# Patient Record
Sex: FEMALE | Race: WHITE | NOT HISPANIC OR LATINO | Employment: OTHER | URBAN - METROPOLITAN AREA
[De-identification: names, ages, dates, MRNs, and addresses within clinical notes are randomized per-mention and may not be internally consistent; named-entity substitution may affect disease eponyms.]

---

## 2017-08-18 ENCOUNTER — CONVERSION ENCOUNTER (OUTPATIENT)
Dept: OTHER | Facility: HOSPITAL | Age: 67
End: 2017-08-18

## 2018-01-17 ENCOUNTER — OFFICE VISIT CONVERTED (OUTPATIENT)
Dept: CARDIOLOGY | Facility: CLINIC | Age: 68
End: 2018-01-17
Attending: INTERNAL MEDICINE

## 2018-01-23 ENCOUNTER — OFFICE VISIT CONVERTED (OUTPATIENT)
Dept: FAMILY MEDICINE CLINIC | Facility: CLINIC | Age: 68
End: 2018-01-23
Attending: NURSE PRACTITIONER

## 2018-02-12 ENCOUNTER — OFFICE VISIT CONVERTED (OUTPATIENT)
Dept: FAMILY MEDICINE CLINIC | Facility: CLINIC | Age: 68
End: 2018-02-12
Attending: NURSE PRACTITIONER

## 2018-10-09 ENCOUNTER — OFFICE VISIT CONVERTED (OUTPATIENT)
Dept: FAMILY MEDICINE CLINIC | Facility: CLINIC | Age: 68
End: 2018-10-09
Attending: NURSE PRACTITIONER

## 2018-10-18 ENCOUNTER — CONVERSION ENCOUNTER (OUTPATIENT)
Dept: OTHER | Facility: HOSPITAL | Age: 68
End: 2018-10-18

## 2018-11-28 ENCOUNTER — OFFICE VISIT CONVERTED (OUTPATIENT)
Dept: CARDIOLOGY | Facility: CLINIC | Age: 68
End: 2018-11-28
Attending: INTERNAL MEDICINE

## 2018-12-13 ENCOUNTER — CONVERSION ENCOUNTER (OUTPATIENT)
Dept: CARDIOLOGY | Facility: CLINIC | Age: 68
End: 2018-12-13
Attending: INTERNAL MEDICINE

## 2018-12-19 ENCOUNTER — CONVERSION ENCOUNTER (OUTPATIENT)
Dept: CARDIOLOGY | Facility: CLINIC | Age: 68
End: 2018-12-19
Attending: INTERNAL MEDICINE

## 2019-01-17 ENCOUNTER — HOSPITAL ENCOUNTER (OUTPATIENT)
Dept: OTHER | Facility: HOSPITAL | Age: 69
Discharge: HOME | End: 2019-01-17
Attending: INTERNAL MEDICINE

## 2019-01-17 LAB
ANION GAP SERPL CALC-SCNC: 17 MMOL/L (ref 8–19)
BNP SERPL-MCNC: 75 PG/ML (ref 0–900)
BUN SERPL-MCNC: 21 MG/DL (ref 5–25)
BUN/CREAT SERPL: 27 {RATIO} (ref 6–20)
CALCIUM SERPL-MCNC: 9.2 MG/DL (ref 8.7–10.4)
CHLORIDE SERPL-SCNC: 102 MMOL/L (ref 99–111)
CONV CO2: 25 MMOL/L (ref 22–32)
CREAT UR-MCNC: 0.79 MG/DL (ref 0.5–0.9)
GFR SERPLBLD BASED ON 1.73 SQ M-ARVRAT: >60 ML/MIN/{1.73_M2}
GLUCOSE SERPL-MCNC: 87 MG/DL (ref 65–99)
OSMOLALITY SERPL CALC.SUM OF ELEC: 292 MOSM/KG (ref 273–304)
POTASSIUM SERPL-SCNC: 4.2 MMOL/L (ref 3.5–5.3)
SODIUM SERPL-SCNC: 140 MMOL/L (ref 135–147)

## 2019-02-26 ENCOUNTER — OFFICE VISIT CONVERTED (OUTPATIENT)
Dept: FAMILY MEDICINE CLINIC | Facility: CLINIC | Age: 69
End: 2019-02-26
Attending: NURSE PRACTITIONER

## 2019-02-26 ENCOUNTER — HOSPITAL ENCOUNTER (OUTPATIENT)
Dept: FAMILY MEDICINE CLINIC | Facility: CLINIC | Age: 69
Discharge: HOME | End: 2019-02-26
Attending: NURSE PRACTITIONER

## 2019-02-28 ENCOUNTER — HOSPITAL ENCOUNTER (OUTPATIENT)
Dept: OTHER | Facility: HOSPITAL | Age: 69
Discharge: HOME | End: 2019-02-28
Attending: NURSE PRACTITIONER

## 2019-02-28 LAB
BACTERIA UR CULT: NORMAL
CREAT BLD-MCNC: 1 MG/DL (ref 0.6–1.4)
GFR SERPLBLD BASED ON 1.73 SQ M-ARVRAT: 58 ML/MIN/{1.73_M2}

## 2019-04-29 ENCOUNTER — OFFICE VISIT CONVERTED (OUTPATIENT)
Dept: UROLOGY | Facility: CLINIC | Age: 69
End: 2019-04-29
Attending: UROLOGY

## 2019-05-06 ENCOUNTER — HOSPITAL ENCOUNTER (OUTPATIENT)
Dept: FAMILY MEDICINE CLINIC | Facility: CLINIC | Age: 69
Discharge: HOME | End: 2019-05-06
Attending: NURSE PRACTITIONER

## 2019-05-06 ENCOUNTER — OFFICE VISIT CONVERTED (OUTPATIENT)
Dept: FAMILY MEDICINE CLINIC | Facility: CLINIC | Age: 69
End: 2019-05-06
Attending: NURSE PRACTITIONER

## 2019-05-08 LAB
AMOXICILLIN+CLAV SUSC ISLT: <=2
BACTERIA UR CULT: ABNORMAL
CEFAZOLIN SUSC ISLT: <=4
CEFEPIME SUSC ISLT: <=1
CEFTAZIDIME SUSC ISLT: <=1
CEFTRIAXONE SUSC ISLT: <=1
CEFUROXIME ORAL SUSC ISLT: 8
CEFUROXIME PARENTER SUSC ISLT: 8
CIPROFLOXACIN SUSC ISLT: <=0.25
ERTAPENEM SUSC ISLT: <=0.5
GENTAMICIN SUSC ISLT: <=1
LEVOFLOXACIN SUSC ISLT: <=0.12
NITROFURANTOIN SUSC ISLT: 32
TETRACYCLINE SUSC ISLT: <=1
TMP SMX SUSC ISLT: <=20
TOBRAMYCIN SUSC ISLT: <=1

## 2019-05-20 ENCOUNTER — PROCEDURE VISIT CONVERTED (OUTPATIENT)
Dept: UROLOGY | Facility: CLINIC | Age: 69
End: 2019-05-20
Attending: UROLOGY

## 2019-06-03 ENCOUNTER — CONVERSION ENCOUNTER (OUTPATIENT)
Dept: FAMILY MEDICINE CLINIC | Facility: CLINIC | Age: 69
End: 2019-06-03

## 2019-06-03 ENCOUNTER — OFFICE VISIT CONVERTED (OUTPATIENT)
Dept: FAMILY MEDICINE CLINIC | Facility: CLINIC | Age: 69
End: 2019-06-03
Attending: NURSE PRACTITIONER

## 2019-06-11 ENCOUNTER — HOSPITAL ENCOUNTER (OUTPATIENT)
Dept: OTHER | Facility: HOSPITAL | Age: 69
Discharge: HOME | End: 2019-06-11
Attending: INTERNAL MEDICINE

## 2019-06-11 LAB
ALBUMIN SERPL-MCNC: 4.6 G/DL (ref 3.5–5)
ALBUMIN/GLOB SERPL: 1.4 {RATIO} (ref 1.4–2.6)
ALP SERPL-CCNC: 45 U/L (ref 43–160)
ALT SERPL-CCNC: 16 U/L (ref 10–40)
ANION GAP SERPL CALC-SCNC: 17 MMOL/L (ref 8–19)
AST SERPL-CCNC: 16 U/L (ref 15–50)
BILIRUB SERPL-MCNC: 0.25 MG/DL (ref 0.2–1.3)
BUN SERPL-MCNC: 21 MG/DL (ref 5–25)
BUN/CREAT SERPL: 26 {RATIO} (ref 6–20)
CALCIUM SERPL-MCNC: 9.3 MG/DL (ref 8.7–10.4)
CHLORIDE SERPL-SCNC: 103 MMOL/L (ref 99–111)
CHOLEST SERPL-MCNC: 195 MG/DL (ref 107–200)
CHOLEST/HDLC SERPL: 3.4 {RATIO} (ref 3–6)
CONV CO2: 26 MMOL/L (ref 22–32)
CONV TOTAL PROTEIN: 7.8 G/DL (ref 6.3–8.2)
CREAT UR-MCNC: 0.81 MG/DL (ref 0.5–0.9)
GFR SERPLBLD BASED ON 1.73 SQ M-ARVRAT: >60 ML/MIN/{1.73_M2}
GLOBULIN UR ELPH-MCNC: 3.2 G/DL (ref 2–3.5)
GLUCOSE SERPL-MCNC: 94 MG/DL (ref 65–99)
HDLC SERPL-MCNC: 57 MG/DL (ref 40–60)
LDLC SERPL CALC-MCNC: 121 MG/DL (ref 70–100)
OSMOLALITY SERPL CALC.SUM OF ELEC: 297 MOSM/KG (ref 273–304)
POTASSIUM SERPL-SCNC: 4.3 MMOL/L (ref 3.5–5.3)
SODIUM SERPL-SCNC: 142 MMOL/L (ref 135–147)
TRIGL SERPL-MCNC: 86 MG/DL (ref 40–150)
VLDLC SERPL-MCNC: 17 MG/DL (ref 5–37)

## 2019-07-01 ENCOUNTER — OFFICE VISIT CONVERTED (OUTPATIENT)
Dept: CARDIOLOGY | Facility: CLINIC | Age: 69
End: 2019-07-01
Attending: INTERNAL MEDICINE

## 2019-07-23 ENCOUNTER — HOSPITAL ENCOUNTER (OUTPATIENT)
Dept: OTHER | Facility: HOSPITAL | Age: 69
Discharge: HOME | End: 2019-07-23
Attending: INTERNAL MEDICINE

## 2019-07-23 LAB
ALBUMIN SERPL-MCNC: 4.3 G/DL (ref 3.5–5)
ALBUMIN/GLOB SERPL: 1.2 {RATIO} (ref 1.4–2.6)
ALP SERPL-CCNC: 49 U/L (ref 43–160)
ALT SERPL-CCNC: 18 U/L (ref 10–40)
ANION GAP SERPL CALC-SCNC: 17 MMOL/L (ref 8–19)
AST SERPL-CCNC: 19 U/L (ref 15–50)
BILIRUB SERPL-MCNC: 0.27 MG/DL (ref 0.2–1.3)
BUN SERPL-MCNC: 17 MG/DL (ref 5–25)
BUN/CREAT SERPL: 21 {RATIO} (ref 6–20)
CALCIUM SERPL-MCNC: 9.4 MG/DL (ref 8.7–10.4)
CHLORIDE SERPL-SCNC: 101 MMOL/L (ref 99–111)
CONV CO2: 25 MMOL/L (ref 22–32)
CONV TOTAL PROTEIN: 7.8 G/DL (ref 6.3–8.2)
CREAT UR-MCNC: 0.82 MG/DL (ref 0.5–0.9)
GFR SERPLBLD BASED ON 1.73 SQ M-ARVRAT: >60 ML/MIN/{1.73_M2}
GLOBULIN UR ELPH-MCNC: 3.5 G/DL (ref 2–3.5)
GLUCOSE SERPL-MCNC: 92 MG/DL (ref 65–99)
OSMOLALITY SERPL CALC.SUM OF ELEC: 289 MOSM/KG (ref 273–304)
POTASSIUM SERPL-SCNC: 4.1 MMOL/L (ref 3.5–5.3)
SODIUM SERPL-SCNC: 139 MMOL/L (ref 135–147)

## 2019-08-01 ENCOUNTER — CONVERSION ENCOUNTER (OUTPATIENT)
Dept: CARDIOLOGY | Facility: CLINIC | Age: 69
End: 2019-08-01
Attending: INTERNAL MEDICINE

## 2019-09-19 ENCOUNTER — OFFICE VISIT CONVERTED (OUTPATIENT)
Dept: FAMILY MEDICINE CLINIC | Facility: CLINIC | Age: 69
End: 2019-09-19
Attending: NURSE PRACTITIONER

## 2019-09-19 ENCOUNTER — CONVERSION ENCOUNTER (OUTPATIENT)
Dept: FAMILY MEDICINE CLINIC | Facility: CLINIC | Age: 69
End: 2019-09-19

## 2019-10-22 ENCOUNTER — HOSPITAL ENCOUNTER (OUTPATIENT)
Dept: OTHER | Facility: HOSPITAL | Age: 69
Discharge: HOME | End: 2019-10-22
Attending: NURSE PRACTITIONER

## 2019-10-22 LAB
BASOPHILS # BLD MANUAL: 0 10*3/UL (ref 0–0.2)
BASOPHILS NFR BLD MANUAL: 0 % (ref 0–3)
DEPRECATED RDW RBC AUTO: 42.5 FL
EOSINOPHIL # BLD MANUAL: 0.03 10*3/UL (ref 0–0.7)
EOSINOPHIL NFR BLD MANUAL: 0.9 % (ref 0–7)
ERYTHROCYTE [DISTWIDTH] IN BLOOD BY AUTOMATED COUNT: 13 % (ref 11.5–14.5)
GRANS (ABSOLUTE): 2.26 10*3/UL (ref 2–8)
GRANS: 64.9 % (ref 30–85)
HBA1C MFR BLD: 11.8 G/DL (ref 12–16)
HCT VFR BLD AUTO: 34.7 % (ref 37–47)
IMM GRANULOCYTES # BLD: 0.01 10*3/UL (ref 0–0.54)
IMM GRANULOCYTES NFR BLD: 0.3 % (ref 0–0.43)
LYMPHOCYTES # BLD MANUAL: 0.82 10*3/UL (ref 1–5)
LYMPHOCYTES NFR BLD MANUAL: 10.3 % (ref 3–10)
MCH RBC QN AUTO: 30.2 PG (ref 27–31)
MCHC RBC AUTO-ENTMCNC: 34 G/DL (ref 33–37)
MCV RBC AUTO: 88.7 FL (ref 81–99)
MONOCYTES # BLD AUTO: 0.36 10*3/UL (ref 0.2–1.2)
PLATELET # BLD AUTO: 200 10*3/UL (ref 130–400)
PMV BLD AUTO: 10.5 FL (ref 7.4–10.4)
RBC # BLD AUTO: 3.91 10*6/UL (ref 4.2–5.4)
T4 FREE SERPL-MCNC: 1.2 NG/DL (ref 0.9–1.8)
TSH SERPL-ACNC: 1.44 M[IU]/L (ref 0.27–4.2)
VARIANT LYMPHS NFR BLD MANUAL: 23.6 % (ref 20–45)
WBC # BLD AUTO: 3.48 10*3/UL (ref 4.8–10.8)

## 2019-11-06 ENCOUNTER — HOSPITAL ENCOUNTER (OUTPATIENT)
Dept: OTHER | Facility: HOSPITAL | Age: 69
Discharge: HOME | End: 2019-11-06
Attending: NURSE PRACTITIONER

## 2019-11-06 LAB
BASOPHILS # BLD MANUAL: 0.01 10*3/UL (ref 0–0.2)
BASOPHILS NFR BLD MANUAL: 0.3 % (ref 0–3)
DEPRECATED RDW RBC AUTO: 42.8 FL
EOSINOPHIL # BLD MANUAL: 0.05 10*3/UL (ref 0–0.7)
EOSINOPHIL NFR BLD MANUAL: 1.4 % (ref 0–7)
ERYTHROCYTE [DISTWIDTH] IN BLOOD BY AUTOMATED COUNT: 12.9 % (ref 11.5–14.5)
GRANS (ABSOLUTE): 2.06 10*3/UL (ref 2–8)
GRANS: 55.8 % (ref 30–85)
HBA1C MFR BLD: 11.1 G/DL (ref 12–16)
HCT VFR BLD AUTO: 32.6 % (ref 37–47)
IMM GRANULOCYTES # BLD: 0.01 10*3/UL (ref 0–0.54)
IMM GRANULOCYTES NFR BLD: 0.3 % (ref 0–0.43)
LYMPHOCYTES # BLD MANUAL: 1.19 10*3/UL (ref 1–5)
LYMPHOCYTES NFR BLD MANUAL: 10 % (ref 3–10)
MCH RBC QN AUTO: 30.6 PG (ref 27–31)
MCHC RBC AUTO-ENTMCNC: 34 G/DL (ref 33–37)
MCV RBC AUTO: 89.8 FL (ref 81–99)
MONOCYTES # BLD AUTO: 0.37 10*3/UL (ref 0.2–1.2)
PLATELET # BLD AUTO: 193 10*3/UL (ref 130–400)
PMV BLD AUTO: 10.1 FL (ref 7.4–10.4)
RBC # BLD AUTO: 3.63 10*6/UL (ref 4.2–5.4)
VARIANT LYMPHS NFR BLD MANUAL: 32.2 % (ref 20–45)
WBC # BLD AUTO: 3.69 10*3/UL (ref 4.8–10.8)

## 2019-11-07 ENCOUNTER — HOSPITAL ENCOUNTER (OUTPATIENT)
Dept: OTHER | Facility: HOSPITAL | Age: 69
Discharge: HOME | End: 2019-11-07
Attending: NURSE PRACTITIONER

## 2019-11-07 LAB
FERRITIN SERPL-MCNC: 299 NG/ML (ref 10–200)
FOLATE SERPL-MCNC: >20 NG/ML (ref 4.8–20)
IRON SATN MFR SERPL: 33 % (ref 20–55)
IRON SERPL-MCNC: 91 UG/DL (ref 60–170)
TIBC SERPL-MCNC: 273 UG/DL (ref 245–450)
TRANSFERRIN SERPL-MCNC: 191 MG/DL (ref 250–380)
VIT B12 SERPL-MCNC: 643 PG/ML (ref 211–911)

## 2019-11-19 ENCOUNTER — HOSPITAL ENCOUNTER (OUTPATIENT)
Dept: OTHER | Facility: HOSPITAL | Age: 69
Discharge: HOME | End: 2019-11-19
Attending: NURSE PRACTITIONER

## 2019-11-19 LAB
BASOPHILS # BLD MANUAL: 0 10*3/UL (ref 0–0.2)
BASOPHILS NFR BLD MANUAL: 0 % (ref 0–3)
DEPRECATED RDW RBC AUTO: 42.8 FL
EOSINOPHIL # BLD MANUAL: 0.06 10*3/UL (ref 0–0.7)
EOSINOPHIL NFR BLD MANUAL: 1.5 % (ref 0–7)
ERYTHROCYTE [DISTWIDTH] IN BLOOD BY AUTOMATED COUNT: 12.8 % (ref 11.5–14.5)
GRANS (ABSOLUTE): 2.45 10*3/UL (ref 2–8)
GRANS: 59.6 % (ref 30–85)
HBA1C MFR BLD: 11.2 G/DL (ref 12–16)
HCT VFR BLD AUTO: 33.1 % (ref 37–47)
IMM GRANULOCYTES # BLD: 0.02 10*3/UL (ref 0–0.54)
IMM GRANULOCYTES NFR BLD: 0.5 % (ref 0–0.43)
LYMPHOCYTES # BLD MANUAL: 1.21 10*3/UL (ref 1–5)
LYMPHOCYTES NFR BLD MANUAL: 9 % (ref 3–10)
MCH RBC QN AUTO: 30.4 PG (ref 27–31)
MCHC RBC AUTO-ENTMCNC: 33.8 G/DL (ref 33–37)
MCV RBC AUTO: 89.9 FL (ref 81–99)
MONOCYTES # BLD AUTO: 0.37 10*3/UL (ref 0.2–1.2)
PLATELET # BLD AUTO: 176 10*3/UL (ref 130–400)
PMV BLD AUTO: 9.7 FL (ref 7.4–10.4)
RBC # BLD AUTO: 3.68 10*6/UL (ref 4.2–5.4)
VARIANT LYMPHS NFR BLD MANUAL: 29.4 % (ref 20–45)
WBC # BLD AUTO: 4.11 10*3/UL (ref 4.8–10.8)

## 2020-01-15 ENCOUNTER — HOSPITAL ENCOUNTER (OUTPATIENT)
Dept: OTHER | Facility: HOSPITAL | Age: 70
Discharge: HOME | End: 2020-01-15
Attending: INTERNAL MEDICINE

## 2020-01-15 LAB
ALBUMIN SERPL-MCNC: 4.5 G/DL (ref 3.5–5)
ALBUMIN/GLOB SERPL: 1.2 {RATIO} (ref 1.4–2.6)
ALP SERPL-CCNC: 45 U/L (ref 43–160)
ALT SERPL-CCNC: 20 U/L (ref 10–40)
ANION GAP SERPL CALC-SCNC: 18 MMOL/L (ref 8–19)
AST SERPL-CCNC: 19 U/L (ref 15–50)
BILIRUB SERPL-MCNC: 0.34 MG/DL (ref 0.2–1.3)
BUN SERPL-MCNC: 26 MG/DL (ref 5–25)
BUN/CREAT SERPL: 25 {RATIO} (ref 6–20)
CALCIUM SERPL-MCNC: 9.6 MG/DL (ref 8.7–10.4)
CHLORIDE SERPL-SCNC: 102 MMOL/L (ref 99–111)
CHOLEST SERPL-MCNC: 196 MG/DL (ref 107–200)
CHOLEST/HDLC SERPL: 4.2 {RATIO} (ref 3–6)
CONV CO2: 24 MMOL/L (ref 22–32)
CONV TOTAL PROTEIN: 8.3 G/DL (ref 6.3–8.2)
CREAT UR-MCNC: 1.02 MG/DL (ref 0.5–0.9)
GFR SERPLBLD BASED ON 1.73 SQ M-ARVRAT: 57 ML/MIN/{1.73_M2}
GLOBULIN UR ELPH-MCNC: 3.8 G/DL (ref 2–3.5)
GLUCOSE SERPL-MCNC: 91 MG/DL (ref 65–99)
HDLC SERPL-MCNC: 47 MG/DL (ref 40–60)
LDLC SERPL CALC-MCNC: 130 MG/DL (ref 70–100)
OSMOLALITY SERPL CALC.SUM OF ELEC: 292 MOSM/KG (ref 273–304)
POTASSIUM SERPL-SCNC: 4.5 MMOL/L (ref 3.5–5.3)
SODIUM SERPL-SCNC: 139 MMOL/L (ref 135–147)
TRIGL SERPL-MCNC: 95 MG/DL (ref 40–150)
VLDLC SERPL-MCNC: 19 MG/DL (ref 5–37)

## 2020-01-22 ENCOUNTER — OFFICE VISIT CONVERTED (OUTPATIENT)
Dept: CARDIOLOGY | Facility: CLINIC | Age: 70
End: 2020-01-22
Attending: INTERNAL MEDICINE

## 2020-01-30 ENCOUNTER — HOSPITAL ENCOUNTER (OUTPATIENT)
Dept: NUCLEAR MEDICINE | Facility: HOSPITAL | Age: 70
Discharge: HOME | End: 2020-01-30
Attending: INTERNAL MEDICINE

## 2020-02-19 ENCOUNTER — OFFICE VISIT CONVERTED (OUTPATIENT)
Dept: CARDIOLOGY | Facility: CLINIC | Age: 70
End: 2020-02-19
Attending: INTERNAL MEDICINE

## 2020-03-25 ENCOUNTER — TELEMEDICINE CONVERTED (OUTPATIENT)
Dept: FAMILY MEDICINE CLINIC | Facility: CLINIC | Age: 70
End: 2020-03-25
Attending: NURSE PRACTITIONER

## 2020-06-25 ENCOUNTER — HOSPITAL ENCOUNTER (OUTPATIENT)
Dept: NUCLEAR MEDICINE | Facility: HOSPITAL | Age: 70
Discharge: HOME | End: 2020-06-25
Attending: INTERNAL MEDICINE

## 2020-08-08 ENCOUNTER — HOSPITAL ENCOUNTER (OUTPATIENT)
Dept: OTHER | Facility: HOSPITAL | Age: 70
Discharge: HOME | End: 2020-08-08
Attending: INTERNAL MEDICINE

## 2020-08-08 LAB
ALBUMIN SERPL-MCNC: 4.2 G/DL (ref 3.5–5)
ALBUMIN/GLOB SERPL: 1.2 {RATIO} (ref 1.4–2.6)
ALP SERPL-CCNC: 47 U/L (ref 43–160)
ALT SERPL-CCNC: 27 U/L (ref 10–40)
ANION GAP SERPL CALC-SCNC: 17 MMOL/L (ref 8–19)
AST SERPL-CCNC: 28 U/L (ref 15–50)
BILIRUB SERPL-MCNC: 0.25 MG/DL (ref 0.2–1.3)
BUN SERPL-MCNC: 24 MG/DL (ref 5–25)
BUN/CREAT SERPL: 24 {RATIO} (ref 6–20)
CALCIUM SERPL-MCNC: 10.2 MG/DL (ref 8.7–10.4)
CHLORIDE SERPL-SCNC: 101 MMOL/L (ref 99–111)
CHOLEST SERPL-MCNC: 214 MG/DL (ref 107–200)
CHOLEST/HDLC SERPL: 4.3 {RATIO} (ref 3–6)
CONV CO2: 26 MMOL/L (ref 22–32)
CONV TOTAL PROTEIN: 7.8 G/DL (ref 6.3–8.2)
CREAT UR-MCNC: 1.01 MG/DL (ref 0.5–0.9)
GFR SERPLBLD BASED ON 1.73 SQ M-ARVRAT: 57 ML/MIN/{1.73_M2}
GLOBULIN UR ELPH-MCNC: 3.6 G/DL (ref 2–3.5)
GLUCOSE SERPL-MCNC: 95 MG/DL (ref 65–99)
HDLC SERPL-MCNC: 50 MG/DL (ref 40–60)
LDLC SERPL CALC-MCNC: 133 MG/DL (ref 70–100)
OSMOLALITY SERPL CALC.SUM OF ELEC: 294 MOSM/KG (ref 273–304)
POTASSIUM SERPL-SCNC: 4.2 MMOL/L (ref 3.5–5.3)
SODIUM SERPL-SCNC: 140 MMOL/L (ref 135–147)
TRIGL SERPL-MCNC: 157 MG/DL (ref 40–150)
VLDLC SERPL-MCNC: 31 MG/DL (ref 5–37)

## 2020-08-10 ENCOUNTER — HOSPITAL ENCOUNTER (OUTPATIENT)
Dept: PREADMISSION TESTING | Facility: HOSPITAL | Age: 70
Discharge: HOME | End: 2020-08-10
Attending: INTERNAL MEDICINE

## 2020-08-11 LAB — SARS-COV-2 RNA SPEC QL NAA+PROBE: NOT DETECTED

## 2020-08-14 ENCOUNTER — HOSPITAL ENCOUNTER (OUTPATIENT)
Dept: CARDIOLOGY | Facility: HOSPITAL | Age: 70
Discharge: HOME | End: 2020-08-14
Attending: INTERNAL MEDICINE

## 2020-08-19 ENCOUNTER — CONVERSION ENCOUNTER (OUTPATIENT)
Dept: CARDIOLOGY | Facility: CLINIC | Age: 70
End: 2020-08-19

## 2020-08-19 ENCOUNTER — OFFICE VISIT CONVERTED (OUTPATIENT)
Dept: CARDIOLOGY | Facility: CLINIC | Age: 70
End: 2020-08-19
Attending: INTERNAL MEDICINE

## 2020-09-03 ENCOUNTER — HOSPITAL ENCOUNTER (OUTPATIENT)
Dept: PREADMISSION TESTING | Facility: HOSPITAL | Age: 70
Discharge: HOME | End: 2020-09-03
Attending: INTERNAL MEDICINE

## 2020-09-04 LAB — SARS-COV-2 RNA SPEC QL NAA+PROBE: NOT DETECTED

## 2020-09-08 ENCOUNTER — HOSPITAL ENCOUNTER (OUTPATIENT)
Dept: CARDIOLOGY | Facility: HOSPITAL | Age: 70
Discharge: HOME | End: 2020-09-08
Attending: INTERNAL MEDICINE

## 2020-09-22 ENCOUNTER — OFFICE VISIT CONVERTED (OUTPATIENT)
Dept: PULMONOLOGY | Facility: CLINIC | Age: 70
End: 2020-09-22
Attending: INTERNAL MEDICINE

## 2020-10-05 ENCOUNTER — HOSPITAL ENCOUNTER (OUTPATIENT)
Dept: SLEEP MEDICINE | Facility: HOSPITAL | Age: 70
Discharge: HOME | End: 2020-10-05
Attending: INTERNAL MEDICINE

## 2020-10-07 ENCOUNTER — OUTSIDE FACILITY SERVICE (OUTPATIENT)
Dept: SLEEP MEDICINE | Facility: HOSPITAL | Age: 70
End: 2020-10-07

## 2020-10-07 ENCOUNTER — OFFICE VISIT CONVERTED (OUTPATIENT)
Dept: FAMILY MEDICINE CLINIC | Age: 70
End: 2020-10-07
Attending: NURSE PRACTITIONER

## 2020-10-07 PROCEDURE — 95810 POLYSOM 6/> YRS 4/> PARAM: CPT | Performed by: INTERNAL MEDICINE

## 2020-10-12 ENCOUNTER — HOSPITAL ENCOUNTER (OUTPATIENT)
Dept: OTHER | Facility: HOSPITAL | Age: 70
Discharge: HOME | End: 2020-10-12
Attending: INTERNAL MEDICINE

## 2020-10-12 LAB
ALBUMIN SERPL-MCNC: 4.1 G/DL (ref 3.5–5)
ALBUMIN/GLOB SERPL: 1.1 {RATIO} (ref 1.4–2.6)
ALP SERPL-CCNC: 46 U/L (ref 43–160)
ALT SERPL-CCNC: 17 U/L (ref 10–40)
ANION GAP SERPL CALC-SCNC: 12 MMOL/L (ref 8–19)
APPEARANCE UR: CLEAR
AST SERPL-CCNC: 19 U/L (ref 15–50)
BACTERIA UR CULT: NORMAL
BACTERIA UR QL AUTO: ABNORMAL
BASOPHILS # BLD AUTO: 0.01 10*3/UL (ref 0–0.2)
BASOPHILS NFR BLD AUTO: 0.4 % (ref 0–3)
BILIRUB SERPL-MCNC: 0.31 MG/DL (ref 0.2–1.3)
BILIRUB UR QL: NEGATIVE
BUN SERPL-MCNC: 21 MG/DL (ref 5–25)
BUN/CREAT SERPL: 21 {RATIO} (ref 6–20)
CALCIUM SERPL-MCNC: 9.5 MG/DL (ref 8.7–10.4)
CASTS URNS QL MICRO: ABNORMAL /[LPF]
CHLORIDE SERPL-SCNC: 103 MMOL/L (ref 99–111)
CHOLEST SERPL-MCNC: 158 MG/DL (ref 107–200)
CHOLEST/HDLC SERPL: 3.4 {RATIO} (ref 3–6)
COLOR UR: YELLOW
CONV ABS IMM GRAN: 0.02 10*3/UL (ref 0–0.2)
CONV CO2: 26 MMOL/L (ref 22–32)
CONV IMMATURE GRAN: 0.8 % (ref 0–1.8)
CONV LEUKOCYTE ESTERASE: ABNORMAL
CONV TOTAL PROTEIN: 7.7 G/DL (ref 6.3–8.2)
CONV UROBILINOGEN IN URINE BY AUTOMATED TEST STRIP: 0.2 {EHRLICHU}/DL (ref 0.1–1)
CREAT UR-MCNC: 1.01 MG/DL (ref 0.5–0.9)
DEPRECATED RDW RBC AUTO: 43 FL (ref 36.4–46.3)
EOSINOPHIL # BLD AUTO: 0.06 10*3/UL (ref 0–0.7)
EOSINOPHIL # BLD AUTO: 2.3 % (ref 0–7)
EPI CELLS #/AREA URNS HPF: ABNORMAL /[HPF]
ERYTHROCYTE [DISTWIDTH] IN BLOOD BY AUTOMATED COUNT: 13.2 % (ref 11.7–14.4)
GFR SERPLBLD BASED ON 1.73 SQ M-ARVRAT: 57 ML/MIN/{1.73_M2}
GLOBULIN UR ELPH-MCNC: 3.6 G/DL (ref 2–3.5)
GLUCOSE 24H UR-MCNC: NEGATIVE MG/DL
GLUCOSE SERPL-MCNC: 95 MG/DL (ref 65–99)
HCT VFR BLD AUTO: 34.7 % (ref 37–47)
HDLC SERPL-MCNC: 46 MG/DL (ref 40–60)
HGB BLD-MCNC: 11.5 G/DL (ref 12–16)
HGB UR QL STRIP: ABNORMAL
KETONES UR QL STRIP: NEGATIVE MG/DL
LDLC SERPL CALC-MCNC: 84 MG/DL (ref 70–100)
LYMPHOCYTES # BLD AUTO: 0.75 10*3/UL (ref 1–5)
LYMPHOCYTES NFR BLD AUTO: 28.6 % (ref 20–45)
MCH RBC QN AUTO: 29.5 PG (ref 27–31)
MCHC RBC AUTO-ENTMCNC: 33.1 G/DL (ref 33–37)
MCV RBC AUTO: 89 FL (ref 81–99)
MONOCYTES # BLD AUTO: 0.29 10*3/UL (ref 0.2–1.2)
MONOCYTES NFR BLD AUTO: 11.1 % (ref 3–10)
MUCOUS THREADS URNS QL MICRO: ABNORMAL
NEUTROPHILS # BLD AUTO: 1.49 10*3/UL (ref 2–8)
NEUTROPHILS NFR BLD AUTO: 56.8 % (ref 30–85)
NITRITE UR-MCNC: NEGATIVE MG/ML
NRBC CBCN: 0 % (ref 0–0.7)
OSMOLALITY SERPL CALC.SUM OF ELEC: 287 MOSM/KG (ref 273–304)
PH UR STRIP.AUTO: 7 [PH] (ref 5–8)
PLATELET # BLD AUTO: 179 10*3/UL (ref 130–400)
PMV BLD AUTO: 10.2 FL (ref 9.4–12.3)
POTASSIUM SERPL-SCNC: 4.4 MMOL/L (ref 3.5–5.3)
PROT UR-MCNC: NEGATIVE MG/DL
RBC # BLD AUTO: 3.9 10*6/UL (ref 4.2–5.4)
RBC # BLD AUTO: ABNORMAL /[HPF]
SODIUM SERPL-SCNC: 137 MMOL/L (ref 135–147)
SP GR UR STRIP: 1.02 (ref 1–1.03)
SPECIMEN SOURCE: ABNORMAL
TRIGL SERPL-MCNC: 142 MG/DL (ref 40–150)
TSH SERPL-ACNC: 2.21 M[IU]/L (ref 0.27–4.2)
UNIDENT CRYS URNS QL MICRO: ABNORMAL /[HPF]
VLDLC SERPL-MCNC: 28 MG/DL (ref 5–37)
WBC # BLD AUTO: 2.62 10*3/UL (ref 4.8–10.8)
WBC #/AREA URNS HPF: ABNORMAL /[HPF]

## 2020-10-14 LAB
AMPICILLIN SUSC ISLT: <=2
BACTERIA UR CULT: ABNORMAL
CIPROFLOXACIN SUSC ISLT: <=0.5
CONV GENTAMICIN HIGH LEVEL SYNERGY: ABNORMAL
CONV STREPTOMYCIN HIGH LEVEL SYNERGY: ABNORMAL
DAPTOMYCIN SUSC ISLT: 4
DOXYCYCLINE SUSC ISLT: 8
ERYTHROMYCIN SUSC ISLT: 4
LEVOFLOXACIN SUSC ISLT: 1
LINEZOLID SUSC ISLT: 2
NITROFURANTOIN SUSC ISLT: <=16
TETRACYCLINE SUSC ISLT: >=16
VANCOMYCIN SUSC ISLT: 1

## 2020-10-22 ENCOUNTER — HOSPITAL ENCOUNTER (OUTPATIENT)
Dept: CT IMAGING | Facility: HOSPITAL | Age: 70
Discharge: HOME | End: 2020-10-22
Attending: INTERNAL MEDICINE

## 2020-10-26 ENCOUNTER — HOSPITAL ENCOUNTER (OUTPATIENT)
Dept: OTHER | Facility: HOSPITAL | Age: 70
Discharge: HOME | End: 2020-10-26
Attending: NURSE PRACTITIONER

## 2020-10-26 LAB
ALBUMIN SERPL-MCNC: 4.4 G/DL (ref 3.5–5)
ALBUMIN/GLOB SERPL: 1.2 {RATIO} (ref 1.4–2.6)
ALP SERPL-CCNC: 47 U/L (ref 43–160)
ALT SERPL-CCNC: 30 U/L (ref 10–40)
ANION GAP SERPL CALC-SCNC: 16 MMOL/L (ref 8–19)
APPEARANCE UR: CLEAR
AST SERPL-CCNC: 27 U/L (ref 15–50)
BACTERIA UR QL AUTO: ABNORMAL
BASOPHILS # BLD AUTO: 0.01 10*3/UL (ref 0–0.2)
BASOPHILS NFR BLD AUTO: 0.3 % (ref 0–3)
BILIRUB SERPL-MCNC: 0.37 MG/DL (ref 0.2–1.3)
BILIRUB UR QL: NEGATIVE
BUN SERPL-MCNC: 19 MG/DL (ref 5–25)
BUN/CREAT SERPL: 21 {RATIO} (ref 6–20)
CALCIUM SERPL-MCNC: 9.7 MG/DL (ref 8.7–10.4)
CASTS URNS QL MICRO: ABNORMAL /[LPF]
CHLORIDE SERPL-SCNC: 100 MMOL/L (ref 99–111)
CHOLEST SERPL-MCNC: 198 MG/DL (ref 107–200)
CHOLEST/HDLC SERPL: 4 {RATIO} (ref 3–6)
COLOR UR: YELLOW
CONV ABS IMM GRAN: 0.02 10*3/UL (ref 0–0.2)
CONV CO2: 25 MMOL/L (ref 22–32)
CONV IMMATURE GRAN: 0.6 % (ref 0–1.8)
CONV LEUKOCYTE ESTERASE: NEGATIVE
CONV TOTAL PROTEIN: 8.1 G/DL (ref 6.3–8.2)
CONV UROBILINOGEN IN URINE BY AUTOMATED TEST STRIP: 0.2 {EHRLICHU}/DL (ref 0.1–1)
CREAT UR-MCNC: 0.9 MG/DL (ref 0.5–0.9)
DEPRECATED RDW RBC AUTO: 44.9 FL (ref 36.4–46.3)
EOSINOPHIL # BLD AUTO: 0.08 10*3/UL (ref 0–0.7)
EOSINOPHIL # BLD AUTO: 2.2 % (ref 0–7)
EPI CELLS #/AREA URNS HPF: ABNORMAL /[HPF]
ERYTHROCYTE [DISTWIDTH] IN BLOOD BY AUTOMATED COUNT: 13.3 % (ref 11.7–14.4)
GFR SERPLBLD BASED ON 1.73 SQ M-ARVRAT: >60 ML/MIN/{1.73_M2}
GLOBULIN UR ELPH-MCNC: 3.7 G/DL (ref 2–3.5)
GLUCOSE 24H UR-MCNC: NEGATIVE MG/DL
GLUCOSE SERPL-MCNC: 92 MG/DL (ref 65–99)
HCT VFR BLD AUTO: 36.2 % (ref 37–47)
HDLC SERPL-MCNC: 49 MG/DL (ref 40–60)
HGB BLD-MCNC: 11.7 G/DL (ref 12–16)
HGB UR QL STRIP: NEGATIVE
KETONES UR QL STRIP: NEGATIVE MG/DL
LDLC SERPL CALC-MCNC: 118 MG/DL (ref 70–100)
LYMPHOCYTES # BLD AUTO: 0.93 10*3/UL (ref 1–5)
LYMPHOCYTES NFR BLD AUTO: 25.9 % (ref 20–45)
MCH RBC QN AUTO: 29.6 PG (ref 27–31)
MCHC RBC AUTO-ENTMCNC: 32.3 G/DL (ref 33–37)
MCV RBC AUTO: 91.6 FL (ref 81–99)
MONOCYTES # BLD AUTO: 0.43 10*3/UL (ref 0.2–1.2)
MONOCYTES NFR BLD AUTO: 12 % (ref 3–10)
MUCOUS THREADS URNS QL MICRO: ABNORMAL
NEUTROPHILS # BLD AUTO: 2.12 10*3/UL (ref 2–8)
NEUTROPHILS NFR BLD AUTO: 59 % (ref 30–85)
NITRITE UR-MCNC: NEGATIVE MG/ML
NRBC CBCN: 0 % (ref 0–0.7)
OSMOLALITY SERPL CALC.SUM OF ELEC: 286 MOSM/KG (ref 273–304)
PH UR STRIP.AUTO: 6.5 [PH] (ref 5–8)
PLATELET # BLD AUTO: 197 10*3/UL (ref 130–400)
PMV BLD AUTO: 10.5 FL (ref 9.4–12.3)
POTASSIUM SERPL-SCNC: 4 MMOL/L (ref 3.5–5.3)
PROT UR-MCNC: NEGATIVE MG/DL
RBC # BLD AUTO: 3.95 10*6/UL (ref 4.2–5.4)
RBC # BLD AUTO: ABNORMAL /[HPF]
SODIUM SERPL-SCNC: 137 MMOL/L (ref 135–147)
SP GR UR STRIP: 1.02 (ref 1–1.03)
SPECIMEN SOURCE: ABNORMAL
TRIGL SERPL-MCNC: 153 MG/DL (ref 40–150)
UNIDENT CRYS URNS QL MICRO: ABNORMAL /[HPF]
VLDLC SERPL-MCNC: 31 MG/DL (ref 5–37)
WBC # BLD AUTO: 3.59 10*3/UL (ref 4.8–10.8)
WBC #/AREA URNS HPF: ABNORMAL /[HPF]

## 2020-10-29 ENCOUNTER — OFFICE VISIT CONVERTED (OUTPATIENT)
Dept: PULMONOLOGY | Facility: CLINIC | Age: 70
End: 2020-10-29
Attending: NURSE PRACTITIONER

## 2020-11-25 ENCOUNTER — HOSPITAL ENCOUNTER (OUTPATIENT)
Dept: OTHER | Facility: HOSPITAL | Age: 70
Discharge: HOME | End: 2020-11-25
Attending: NURSE PRACTITIONER

## 2021-01-13 ENCOUNTER — OFFICE VISIT CONVERTED (OUTPATIENT)
Dept: PULMONOLOGY | Facility: CLINIC | Age: 71
End: 2021-01-13
Attending: NURSE PRACTITIONER

## 2021-02-25 ENCOUNTER — HOSPITAL ENCOUNTER (OUTPATIENT)
Dept: OTHER | Facility: HOSPITAL | Age: 71
Discharge: HOME | End: 2021-02-25
Attending: INTERNAL MEDICINE

## 2021-02-25 LAB
ALBUMIN SERPL-MCNC: 4.4 G/DL (ref 3.5–5)
ALBUMIN/GLOB SERPL: 1.3 {RATIO} (ref 1.4–2.6)
ALP SERPL-CCNC: 53 U/L (ref 43–160)
ALT SERPL-CCNC: 25 U/L (ref 10–40)
ANION GAP SERPL CALC-SCNC: 16 MMOL/L (ref 8–19)
AST SERPL-CCNC: 25 U/L (ref 15–50)
BILIRUB SERPL-MCNC: 0.36 MG/DL (ref 0.2–1.3)
BUN SERPL-MCNC: 24 MG/DL (ref 5–25)
BUN/CREAT SERPL: 27 {RATIO} (ref 6–20)
CALCIUM SERPL-MCNC: 9.2 MG/DL (ref 8.7–10.4)
CHLORIDE SERPL-SCNC: 99 MMOL/L (ref 99–111)
CHOLEST SERPL-MCNC: 164 MG/DL (ref 107–200)
CHOLEST/HDLC SERPL: 3.2 {RATIO} (ref 3–6)
CONV CO2: 24 MMOL/L (ref 22–32)
CONV TOTAL PROTEIN: 7.8 G/DL (ref 6.3–8.2)
CREAT UR-MCNC: 0.88 MG/DL (ref 0.5–0.9)
GFR SERPLBLD BASED ON 1.73 SQ M-ARVRAT: >60 ML/MIN/{1.73_M2}
GLOBULIN UR ELPH-MCNC: 3.4 G/DL (ref 2–3.5)
GLUCOSE SERPL-MCNC: 89 MG/DL (ref 65–99)
HDLC SERPL-MCNC: 52 MG/DL (ref 40–60)
LDLC SERPL CALC-MCNC: 87 MG/DL (ref 70–100)
OSMOLALITY SERPL CALC.SUM OF ELEC: 284 MOSM/KG (ref 273–304)
POTASSIUM SERPL-SCNC: 3.9 MMOL/L (ref 3.5–5.3)
SODIUM SERPL-SCNC: 135 MMOL/L (ref 135–147)
TRIGL SERPL-MCNC: 123 MG/DL (ref 40–150)
VLDLC SERPL-MCNC: 25 MG/DL (ref 5–37)

## 2021-03-08 ENCOUNTER — OFFICE VISIT CONVERTED (OUTPATIENT)
Dept: CARDIOLOGY | Facility: CLINIC | Age: 71
End: 2021-03-08
Attending: INTERNAL MEDICINE

## 2021-03-08 ENCOUNTER — CONVERSION ENCOUNTER (OUTPATIENT)
Dept: CARDIOLOGY | Facility: CLINIC | Age: 71
End: 2021-03-08

## 2021-04-15 ENCOUNTER — HOSPITAL ENCOUNTER (OUTPATIENT)
Dept: OTHER | Facility: HOSPITAL | Age: 71
Discharge: HOME | End: 2021-04-15
Attending: NURSE PRACTITIONER

## 2021-04-26 ENCOUNTER — OFFICE VISIT CONVERTED (OUTPATIENT)
Dept: PULMONOLOGY | Facility: CLINIC | Age: 71
End: 2021-04-26
Attending: INTERNAL MEDICINE

## 2021-04-29 ENCOUNTER — HOSPITAL ENCOUNTER (OUTPATIENT)
Dept: OTHER | Facility: HOSPITAL | Age: 71
Discharge: HOME | End: 2021-04-29
Attending: NURSE PRACTITIONER

## 2021-04-29 LAB
ALBUMIN SERPL-MCNC: 4.6 G/DL (ref 3.5–5)
ALBUMIN/GLOB SERPL: 1.3 {RATIO} (ref 1.4–2.6)
ALP SERPL-CCNC: 46 U/L (ref 43–160)
ALT SERPL-CCNC: 28 U/L (ref 10–40)
ANION GAP SERPL CALC-SCNC: 15 MMOL/L (ref 8–19)
AST SERPL-CCNC: 25 U/L (ref 15–50)
BASOPHILS # BLD MANUAL: 0.01 10*3/UL (ref 0–0.2)
BASOPHILS NFR BLD MANUAL: 0.3 % (ref 0–3)
BILIRUB SERPL-MCNC: 0.23 MG/DL (ref 0.2–1.3)
BUN SERPL-MCNC: 26 MG/DL (ref 5–25)
BUN/CREAT SERPL: 24 {RATIO} (ref 6–20)
CALCIUM SERPL-MCNC: 9.7 MG/DL (ref 8.7–10.4)
CHLORIDE SERPL-SCNC: 101 MMOL/L (ref 99–111)
CONV CO2: 27 MMOL/L (ref 22–32)
CONV TOTAL PROTEIN: 8.1 G/DL (ref 6.3–8.2)
CREAT UR-MCNC: 1.07 MG/DL (ref 0.5–0.9)
DEPRECATED RDW RBC AUTO: 43.9 FL
EOSINOPHIL # BLD MANUAL: 0.07 10*3/UL (ref 0–0.7)
EOSINOPHIL NFR BLD MANUAL: 2.1 % (ref 0–7)
ERYTHROCYTE [DISTWIDTH] IN BLOOD BY AUTOMATED COUNT: 13 % (ref 11.5–14.5)
GFR SERPLBLD BASED ON 1.73 SQ M-ARVRAT: 53 ML/MIN/{1.73_M2}
GLOBULIN UR ELPH-MCNC: 3.5 G/DL (ref 2–3.5)
GLUCOSE SERPL-MCNC: 75 MG/DL (ref 65–99)
GRANS (ABSOLUTE): 1.82 10*3/UL (ref 2–8)
GRANS: 55.7 % (ref 30–85)
HBA1C MFR BLD: 12.9 G/DL (ref 12–16)
HCT VFR BLD AUTO: 39.1 % (ref 37–47)
IMM GRANULOCYTES # BLD: 0.02 10*3/UL (ref 0–0.54)
IMM GRANULOCYTES NFR BLD: 0.6 % (ref 0–0.43)
LYMPHOCYTES # BLD MANUAL: 1.03 10*3/UL (ref 1–5)
LYMPHOCYTES NFR BLD MANUAL: 9.8 % (ref 3–10)
MCH RBC QN AUTO: 29.9 PG (ref 27–31)
MCHC RBC AUTO-ENTMCNC: 33 G/DL (ref 33–37)
MCV RBC AUTO: 90.7 FL (ref 81–99)
MONOCYTES # BLD AUTO: 0.32 10*3/UL (ref 0.2–1.2)
OSMOLALITY SERPL CALC.SUM OF ELEC: 291 MOSM/KG (ref 273–304)
PLATELET # BLD AUTO: 163 10*3/UL (ref 130–400)
PMV BLD AUTO: 10 FL (ref 7.4–10.4)
POTASSIUM SERPL-SCNC: 4.4 MMOL/L (ref 3.5–5.3)
RBC # BLD AUTO: 4.31 10*6/UL (ref 4.2–5.4)
SODIUM SERPL-SCNC: 139 MMOL/L (ref 135–147)
VARIANT LYMPHS NFR BLD MANUAL: 31.5 % (ref 20–45)
WBC # BLD AUTO: 3.27 10*3/UL (ref 4.8–10.8)

## 2021-05-01 ENCOUNTER — HOSPITAL ENCOUNTER (OUTPATIENT)
Dept: CARDIOLOGY | Facility: HOSPITAL | Age: 71
Discharge: HOME | End: 2021-05-01
Attending: INTERNAL MEDICINE

## 2021-05-10 ENCOUNTER — OFFICE VISIT CONVERTED (OUTPATIENT)
Dept: CARDIOLOGY | Facility: CLINIC | Age: 71
End: 2021-05-10
Attending: INTERNAL MEDICINE

## 2021-05-13 NOTE — PROGRESS NOTES
Progress Note      Patient Name: Anabell Richards   Patient ID: [id number]   Sex: Female   YOB: 1950    Primary Care Provider: Priscilla GORDILLO   Referring Provider: Priscilla GORDILLO    Visit Date: August 19, 2020    Provider: Jenny Valle MD   Location: [facility]   Location Address: [address] [place] [address], [address]   SHANEKA Vang  [id number]   Location Phone: [phone]          Chief Complaint     Shortness of breath.       History Of Present Illness  REFERRING PROVIDER: Priscilla GORDILLO   Anabell Richards is a 70 year old /White female who continues to be short of breath. She can walk on level ground without any issues, but if she gets on any incline, she gets very short-winded. She is short-winded when she carries things, or exerts herself, such as when she reaches up high. She states essentially it is unchanged since she was here in February. She had put off some of her pulmonary testing until last week. She has occasional palpitations, but denies any chest pain. Swelling is mild. Denies any dizziness, syncope, PND, or orthopnea. She stopped taking her Crestor/rosuvastatin several weeks ago and at least a week before she did her labs. She was also noted to have some heartburn when she took the Crestor at nighttime, but it was better in the daytime.   PAST MEDICAL HISTORY: Hyperlipidemia; Hypertension; Mitral valve regurgitation.   FAMILY HISTORY: Positive for hypertension and heart disease. Negative for diabetes mellitus.   PSYCHOSOCIAL HISTORY: Denies alcohol or tobacco use. Denies mood changes or depression.   CURRENT MEDICATIONS: She was on rosuvastatin 5 mg but none for the last few weeks; Co-Q10 200 mg q. h.s.; aspirin 81 mg daily; metoprolol ER 25 mg 1-1/2 tabs q. h.s.; hydrochlorothiazide 12.5 mg daily; Zestril 5 mg daily; Tylenol 325 mg p.r.n.; Claritin 10 mg p.r.n.; doxycycline 50 mg b.i.d. for a month.       Review of  "Systems  · Cardiovascular  o Admits  o : palpitations (fast, fluttering, or skipping beats), swelling (feet, ankles, hands), shortness of breath while walking or lying flat  o Denies  o : chest pain or angina pectoris   · Respiratory  o Denies  o : chronic or frequent cough, asthma or wheezing      Vitals  Date Time BP Position Site L\R Cuff Size HR RR TEMP (F) WT  HT  BMI kg/m2 BSA m2 O2 Sat        08/19/2020 12:28 /78 Sitting    74 - R   175lbs 16oz 5'  6\" 28.41 1.93           Physical Examination  · Constitutional  o Appearance  o : Awake, alert, in no acute distress.  · Eyes  o Conjunctivae  o : Conjunctivae normal.  · Ears, Nose, Mouth and Throat  o Oral Cavity  o :   § Oral Mucosa  § : Normal.  · Neck  o Jugular Veins  o : No JVD. Good carotid upstroke. No bruits noted.  · Respiratory  o Respiratory  o : Good respiratory effort. Clear to percussion and auscultation.  · Cardiovascular  o Heart  o : PMI is normal. S1, S2 normal. No S3. No S4. 1-2/6 systolic murmur at the apex. Negative diastolic murmur.   o Peripheral Vascular System  o :   § Extremities  § : Good femoral and pedal pulses. No pedal edema.  · Gastrointestinal  o Abdominal Examination  o : Soft. No tenderness or masses felt. No hepatosplenomegaly. Abdominal aorta is not palpable.  · Imaging  o Imaging  o : VQ lung scan of 06/25/2020 indicated findings consistent with low probability of PE. Chest X-ray from 06/25/2020 indicated mildly enlarged cardiac silhouette with no acute pulmonary abnormality.   · Labs  o Labs  o : Most recent labs since she has been off Crestor, BUN 24, creatinine 1.01, total cholesterol 214, triglycerides 157, HDL 50, .   · Diagnostic Testing  o Diagnostic Testing  o : Six-minute walk and PFT results are pending.           Assessment     1.  Shortness of breath.  2.  Hypertension, controlled.  3.  Hyperlipidemia, not to goal with intolerance to higher-dose statins.    4.  Mitral valve regurgitation. "       Plan     1.  Start Zetia 10 mg once a day.  2.  Restart Crestor at 5 mg but every other day.  3.  Awaiting the results of pulmonary function tests, and if normal, will consider a ERNA.  4.  Check lipids in 3 months.   5.  Further plans based on pulmonary function tests.        RAND Deleon/Jenny Valle MD, [facility]  JF:PM:vm               Electronically Signed by: Maxine Katz-, Other -Author on August 20, 2020 08:16:42 AM  Electronically Co-signed by: RADN Diamond -Reviewer on August 20, 2020 08:38:17 AM  Electronically Co-signed by: Jenny Valle MD -Reviewer on August 22, 2020 03:04:46 PM

## 2021-05-14 VITALS
BODY MASS INDEX: 28.93 KG/M2 | SYSTOLIC BLOOD PRESSURE: 136 MMHG | WEIGHT: 180 LBS | HEART RATE: 72 BPM | HEIGHT: 66 IN | DIASTOLIC BLOOD PRESSURE: 74 MMHG

## 2021-05-14 NOTE — PROGRESS NOTES
"   Progress Note      Patient Name: Anabell Richards   Patient ID: [id number]   Sex: Female   YOB: 1950    Primary Care Provider: Priscilla GORDILLO   Referring Provider: Priscilla GORDILLO    Visit Date: March 8, 2021    Provider: Jenny Valle MD   Location: [facility]   Location Address: [address] [place] [address], [address]   [place]KA Vang  [id number]   Location Phone: [phone]          Chief Complaint     Followup of shortness of breath and palpitations.       History Of Present Illness  REFERRING PROVIDER: Maggie GORDILLO   Anabell Richards is a 71 year old /White female with symptomatic shortness of breath who states that since the changes in her medication following the ERNA and she started using the CPAP at night that she is feeling less short of breath with her activities. Her shortness of breath is mild to moderate on moderate to heavy exertion now and she is able to do more of her exercise and everything else. She denies chest pain, palpitations, dizziness, or syncope. She has not had any myalgias.   PAST MEDICAL HISTORY: Hyperlipidemia; Hypertension; Mitral valve regurgitation.   PSYCHOSOCIAL HISTORY: Denies alcohol use. Denies tobacco use.   CURRENT MEDICATIONS: Medication list was reviewed and is as documented. She takes rosuvastatin 5 mg every other day in addition to her other medications.      ALLERGIES: Bactrim DS; flu vaccine ts 2011-12(18 yr+)       Review of Systems  · Cardiovascular  o Admits  o : palpitations (fast, fluttering, or skipping beats), shortness of breath while walking or lying flat  o Denies  o : swelling (feet, ankles, hands), chest pain or angina pectoris   · Respiratory  o Denies  o : chronic or frequent cough      Vitals  Date Time BP Position Site L\R Cuff Size HR RR TEMP (F) WT  HT  BMI kg/m2 BSA m2 O2 Sat FR L/min FiO2 HC       03/08/2021 12:09 /74 Sitting    72 - R   180lbs 0oz 5'  6\" 29.05 1.95             Physical " Examination  · Constitutional  o Appearance  o : Awake, alert, in no acute distress.  · Eyes  o Conjunctivae  o : Conjunctivae normal.  · Ears, Nose, Mouth and Throat  o Oral Cavity  o :   § Oral Mucosa  § : Normal.  · Neck  o Inspection/Palpation/Auscultation  o : No lymphadenopathy. No JVD. No bruit. Good carotid upstroke.  · Respiratory  o Respiratory  o : Clear to percussion and auscultation. Good respiratory effort.  · Cardiovascular  o Heart  o : PMI is normal. S1, S2 normal. No S3. No S4. 1-2/6 systolic murmur at the apex. Negative diastolic murmur.   o Peripheral Vascular System  o :   § Extremities  § : Good femoral and pedal pulses. No pedal edema.  · Gastrointestinal  o Abdominal Examination  o : Soft. No masses or tenderness felt. No hepatosplenomegaly. Abdominal aorta is not palpable.  · Labs  o Labs  o : Chemistry panel was normal. HDL 52, LDL 87, triglycerides 123.          Assessment     1.  Symptomatic shortness of breath, improved.   2.  Hypertension, controlled.   3.  Hyperlipidemia, LDL improved but still not quite at goal.   4.  Mitral valve regurgitation.       Plan     1.  Increase Crestor to 5 mg every day except Thursdays and Sundays. (She will take 5 days a week.)  2.  Increase metoprolol extended release to 50 mg at bedtime.  3.  Home blood pressure monitoring.   4.  She has been encouraged to use her CPAP every night, try to exercise, and lose weight.   5.  She will continue her hydrochlorothiazide, lisinopril, and ezetimibe in the same dosage.   6.  Follow up with her in 8 months or earlier if necessary.     Jenny Valle MD, [facility]  PM/rt               Electronically Signed by: Antonella Henning-, Other -Author on March 16, 2021 08:30:57 AM

## 2021-05-15 VITALS
DIASTOLIC BLOOD PRESSURE: 77 MMHG | HEART RATE: 80 BPM | OXYGEN SATURATION: 97 % | SYSTOLIC BLOOD PRESSURE: 131 MMHG | WEIGHT: 165.5 LBS

## 2021-05-15 VITALS
BODY MASS INDEX: 28.28 KG/M2 | DIASTOLIC BLOOD PRESSURE: 78 MMHG | HEART RATE: 74 BPM | HEIGHT: 66 IN | WEIGHT: 176 LBS | SYSTOLIC BLOOD PRESSURE: 136 MMHG

## 2021-05-15 VITALS
BODY MASS INDEX: 26.06 KG/M2 | SYSTOLIC BLOOD PRESSURE: 120 MMHG | WEIGHT: 166 LBS | HEART RATE: 64 BPM | HEIGHT: 67 IN | DIASTOLIC BLOOD PRESSURE: 70 MMHG

## 2021-05-15 VITALS
DIASTOLIC BLOOD PRESSURE: 60 MMHG | WEIGHT: 170 LBS | SYSTOLIC BLOOD PRESSURE: 146 MMHG | BODY MASS INDEX: 27.32 KG/M2 | HEART RATE: 56 BPM | HEIGHT: 66 IN

## 2021-05-15 VITALS
WEIGHT: 163.25 LBS | BODY MASS INDEX: 26.24 KG/M2 | DIASTOLIC BLOOD PRESSURE: 69 MMHG | SYSTOLIC BLOOD PRESSURE: 128 MMHG | HEIGHT: 66 IN | OXYGEN SATURATION: 100 % | HEART RATE: 59 BPM

## 2021-05-15 VITALS
BODY MASS INDEX: 26.28 KG/M2 | HEIGHT: 66 IN | WEIGHT: 163.5 LBS | SYSTOLIC BLOOD PRESSURE: 126 MMHG | DIASTOLIC BLOOD PRESSURE: 78 MMHG

## 2021-05-15 VITALS
HEIGHT: 66 IN | DIASTOLIC BLOOD PRESSURE: 64 MMHG | SYSTOLIC BLOOD PRESSURE: 124 MMHG | WEIGHT: 169 LBS | HEART RATE: 64 BPM | BODY MASS INDEX: 27.16 KG/M2

## 2021-05-15 VITALS
SYSTOLIC BLOOD PRESSURE: 147 MMHG | WEIGHT: 169 LBS | OXYGEN SATURATION: 99 % | HEART RATE: 69 BPM | DIASTOLIC BLOOD PRESSURE: 65 MMHG | HEIGHT: 66 IN | BODY MASS INDEX: 27.16 KG/M2

## 2021-05-16 VITALS — SYSTOLIC BLOOD PRESSURE: 150 MMHG | HEART RATE: 80 BPM | WEIGHT: 179 LBS | DIASTOLIC BLOOD PRESSURE: 72 MMHG

## 2021-05-16 VITALS
HEIGHT: 66 IN | HEART RATE: 55 BPM | DIASTOLIC BLOOD PRESSURE: 73 MMHG | BODY MASS INDEX: 27.97 KG/M2 | WEIGHT: 174 LBS | SYSTOLIC BLOOD PRESSURE: 147 MMHG

## 2021-05-16 VITALS
SYSTOLIC BLOOD PRESSURE: 138 MMHG | WEIGHT: 172 LBS | HEIGHT: 67 IN | HEART RATE: 56 BPM | DIASTOLIC BLOOD PRESSURE: 66 MMHG | BODY MASS INDEX: 27 KG/M2

## 2021-05-16 VITALS
HEIGHT: 66 IN | TEMPERATURE: 96.7 F | HEART RATE: 84 BPM | WEIGHT: 168 LBS | BODY MASS INDEX: 27 KG/M2 | DIASTOLIC BLOOD PRESSURE: 79 MMHG | OXYGEN SATURATION: 98 % | SYSTOLIC BLOOD PRESSURE: 152 MMHG | RESPIRATION RATE: 12 BRPM

## 2021-05-16 VITALS
HEIGHT: 67 IN | HEART RATE: 72 BPM | DIASTOLIC BLOOD PRESSURE: 64 MMHG | BODY MASS INDEX: 26.68 KG/M2 | WEIGHT: 170 LBS | SYSTOLIC BLOOD PRESSURE: 118 MMHG

## 2021-05-16 VITALS
DIASTOLIC BLOOD PRESSURE: 69 MMHG | WEIGHT: 177 LBS | HEART RATE: 93 BPM | HEIGHT: 66 IN | SYSTOLIC BLOOD PRESSURE: 129 MMHG | BODY MASS INDEX: 28.45 KG/M2 | TEMPERATURE: 98.6 F

## 2021-05-16 VITALS
HEART RATE: 72 BPM | BODY MASS INDEX: 27.94 KG/M2 | SYSTOLIC BLOOD PRESSURE: 134 MMHG | DIASTOLIC BLOOD PRESSURE: 70 MMHG | HEIGHT: 67 IN | WEIGHT: 178 LBS

## 2021-05-18 NOTE — PROGRESS NOTES
Evie Richards  1950     Office/Outpatient Visit    Visit Date: Wed, Oct 7, 2020 01:37 pm    Provider: Maggie Pena N.P. (Assistant: Noelle Stone, )    Location: [facility]        Electronically signed by Maggie Pena N.P. on  10/07/2020 07:22:55 PM                             Subjective:        CC: Ms. Richards is a 70 year old female.  This is her first visit to the clinic.          HPI:           Patient to be evaluated for encounter for general adult medical examination without abnormal findings.  Her last physical exam was 1 year ago.  She is menopausal.  She is not currently using any form of contraception.  She performs breast self-exams occasionally.   Her last mammogram was in 10/2019 and was normal.   She underwent colonoscopy in 2014 with normal results.   Preventative Health updated today.  She is current with her influenza immunization.  Ms. Richards has had an abnormal Pap smear in the past. had D/C done  after than no problems Tobacco: She has never smoked.      ROS:     CONSTITUTIONAL:  Negative for fatigue and fever.      CARDIOVASCULAR:  Positive for hx HTN, High chol for 10 + years.      RESPIRATORY:  Positive for dyspnea ( several years , had sleep study at [facility] 10/2020 ).   Negative for recent cough or frequent wheezing.      GASTROINTESTINAL:  Negative for abdominal pain, constipation, diarrhea, nausea and vomiting.      GENITOURINARY:  Negative for dysuria and hematuria.      MUSCULOSKELETAL:  Negative for arthralgias and myalgias.      INTEGUMENTARY/BREAST:  Negative for atypical mole(s) and rash.      NEUROLOGICAL:  Negative for dizziness, memory loss, paresthesias, tremor and weakness.      ENDOCRINE:  Positive for Hyperthyroidism, resolved itself. no meds.      PSYCHIATRIC:  Negative for anxiety, depression, and sleep disturbances.          Past Medical History / Family History / Social History:         Last Reviewed on 10/07/2020 02:33 PM by  Maggie Pena    Past Medical History:                 PAST MEDICAL HISTORY         Hyperlipidemia    Hypertension     Dr Christiansen     Blood in urine, worked up neg , DR Ariza         CURRENT MEDICAL PROVIDERS:    Cardiologist: DR Valle         PREVENTIVE HEALTH MAINTENANCE             BONE DENSITY: was last done  with normal results     COLORECTAL CANCER SCREENING: Up to date (colonoscopy q10y; sigmoidoscopy q5y; Cologuard q3y) was last done , Results are in chart; colonoscopy with normal results     MAMMOGRAM: was last done 10/2019 with normal results     PAP SMEAR: was last done age 50s with normal results No longer indicated due to age and history         Surgical History:         Biopsy of Both breast; benign    Cataract Removal: bilateral; ;     Dilation and Curettage: at age age 32, abnormal PAP , was neg.;     Cysto for hematuria , neg. , TVT ;         Family History:     Father:  at age 83; Cause of death was A fib  with Clot     Mother:  at age 81; Cause of death was Heart failure     Brother(s): 3 alive and some with thyroid, Addisons disease, Celiac disease, brother(s) total     Sister(s): 3 , one with Lupus , Fibromyalgia. 2 healthy sister(s) total         Social History:     Occupation: Retired (Prior occupation: [employer] , RN for 28 yrs)     Marital Status:      Children: 2 children         Tobacco/Alcohol/Supplements:     Last Reviewed on 10/07/2020 02:33 PM by Maggie Pena    Tobacco: She has never smoked.          Substance Abuse History:     Last Reviewed on 10/07/2020 02:33 PM by Maggie Pena        Mental Health History:     Last Reviewed on 10/07/2020 02:33 PM by Maggie Pena        Communicable Diseases (eg STDs):     Last Reviewed on 10/07/2020 02:33 PM by Maggie Pena        Current Problems:     Last Reviewed on 10/07/2020 02:33 PM by Maggie Pena    Hyperlipidemia, unspecified    Essential (primary) hypertension    Nonrheumatic  mitral (valve) prolapse    Sicca syndrome, unspecified        Immunizations:     Influenza, high dose seasonal 10/7/2020        Allergies:     Last Reviewed on 10/07/2020 02:33 PM by Maggie Pena    Bactrim: Rash         Current Medications:     Last Reviewed on 10/07/2020 02:33 PM by Maggie Pena    aspirin 81 mg oral tablet, delayed release (enteric coated) [take 1 tablet (81 mg) by oral route once daily]    CoQ-10 100 mg oral capsule    HYDROCHLOROTHIAZIDE 12.5 MG TB    METOPROLOL SUCC ER 25 MG TAB    ROSUVASTATIN CALCIUM 5 MG TAB    LISINOPRIL 5 MG TABLET        Objective:        Vitals:         Current: 10/7/2020 1:44:08 PM    Ht:  5 ft, 7 in;  Wt: 174 lbs;  BMI: 27.3T: 97.7 F (temporal);  BP: 120/68 mm Hg (left arm, sitting);  P: 77 bpm (left arm (BP Cuff), sitting)        Exams:     PHYSICAL EXAM:     GENERAL: vital signs recorded - well developed, well nourished;  well groomed;  no apparent distress;     NECK:  supple, full ROM; no thyromegaly; no carotid bruits;     RESPIRATORY: normal respiratory rate and pattern with no distress; normal breath sounds with no rales, rhonchi, wheezes or rubs;     CARDIOVASCULAR: normal rate; rhythm is regular;  no systolic murmur; no edema;     GASTROINTESTINAL: nontender, nondistended; no hepatosplenomegaly or masses; no bruits;     MUSCULOSKELETAL:  Normal range of motion, strength and tone;     NEUROLOGIC: GROSSLY INTACT     PSYCHIATRIC:  appropriate affect and demeanor; normal speech pattern; grossly normal memory;         Assessment:         Z00.00   Encounter for general adult medical examination without abnormal findings       Z23   Encounter for immunization       Z12.31   Encounter for screening mammogram for malignant neoplasm of breast           ORDERS:         Meds Prescribed:       [New Rx] Shingrix (PF) 50 mcg/0.5 mL intramuscular Suspension for Reconstitution [inject 0.5 milliliter (50 mcg) by intramuscular route once], #1 (one) kit, Refills: 0  (zero)         Radiology/Test Orders:       [id number]  Screening digital breast tomosynthesis bi  (Send-Out)            3017F  Colorectal CA screen results documented and reviewed (PV)  (In-House)              Lab Orders:       [id number]  [facility] PHYSICAL: CMP, CBC, TSH, LIPID: [id number], [id number], [id number], [id number]  (Send-Out)            26998  [facility] Urinalysis, automated, with micro  (Send-Out)              Procedures Ordered:       [id number]  Pneumococcal polysaccharide vaccine, 23-valent, adult or immunosuppressed patient dosage, for use in individuals 2 years or older, for subcutaneous or intramuscular use  (In-House)              Other Orders:       1101F  Pt screen for fall risk; document no falls in past year or only 1 fall w/o injury in past year (JORGE L)  (In-House)              Screening mammogram results documented  (Send-Out)              Administration of pneumococcal vaccine  (x1)                  Plan:         Encounter for general adult medical examination without abnormal findings    LABORATORY:  Labs ordered to be performed today include PHYSICAL PANEL; CMP, CBC, TSH, LIPID, , and urinalysis with micro.  MIPS Has had no falls or only one fall without injury in the past year Vaccines Flu and Pneumonia updated in Shot record Screening mammomgram done within last 2 years and results in are chart Colorectal Cancer Screening is up to date and the results are in the chart           Orders:       [id number]  [facility] PHYSICAL: CMP, CBC, TSH, LIPID: [id number], [id number], [id number], [id number]  (Send-Out)            75068  [facility] Urinalysis, automated, with micro  (Send-Out)            1101F  Pt screen for fall risk; document no falls in past year or only 1 fall w/o injury in past year (JORGE L)  (In-House)              Screening mammogram results documented  (Send-Out)            3017F  Colorectal CA screen results documented and reviewed (PV)  (In-House)              Encounter for immunization        IMMUNIZATIONS given  today: Pneumovax.            Prescriptions:       [New Rx] Shingrix (PF) 50 mcg/0.5 mL intramuscular Suspension for Reconstitution [inject 0.5 milliliter (50 mcg) by intramuscular route once], #1 (one) kit, Refills: 0 (zero)           Immunizations:       43224  Pneumococcal polysaccharide vaccine, 23-valent, adult or immunosuppressed patient dosage, for use in individuals 2 years or older, for subcutaneous or intramuscular use  (In-House)      pt tolerated injection without problem          Dose (ml): 0.5  Site: left deltoid  Route: intramuscular  Administered by: Noelle Stone          : [employer]  Lot #: [id number]  Exp: 03/13/2022          NDC: [address]-80        Administration of pneumococcal vaccine  (x1)          Encounter for screening mammogram for malignant neoplasm of breast        FOLLOW-UP TESTING #1:    RADIOLOGY:  I have ordered Screening 3D Mammogram Bilateral to be done today.            Orders:       [id number]  Screening digital breast tomosynthesis bi  (Send-Out)                  Charge Capture:         Primary Diagnosis:     Z00.00  Encounter for general adult medical examination without abnormal findings           Orders:      86091  Preventive medicine, new patient, age 65+ years  (In-House)            1101F  Pt screen for fall risk; document no falls in past year or only 1 fall w/o injury in past year (JORGE L)  (In-House)            3017F  Colorectal CA screen results documented and reviewed (PV)  (In-House)              Z23  Encounter for immunization           Orders:      [id number]  Pneumococcal polysaccharide vaccine, 23-valent, adult or immunosuppressed patient dosage, for use in individuals 2 years or older, for subcutaneous or intramuscular use  (In-House)              Administration of pneumococcal vaccine  (x1)          Z12.31  Encounter for screening mammogram for malignant neoplasm of breast         ADDENDUMS:      ____________________________________     Addendum: 10/09/2020 04:18 PM - Maggie Pena        ADDENDUM: Add [id number]; Remove [id number]    dmt        Addendum: 10/12/2020 01:09 PM - Maggie Pena        add Code Cardiovascular screening to labs , CMP, TSH, CBC, and Lipid should all be under this code. dmt

## 2021-05-22 ENCOUNTER — TRANSCRIBE ORDERS (OUTPATIENT)
Dept: ADMINISTRATIVE | Facility: HOSPITAL | Age: 71
End: 2021-05-22

## 2021-05-22 DIAGNOSIS — R06.00 DYSPNEA, UNSPECIFIED TYPE: Primary | ICD-10-CM

## 2021-05-25 ENCOUNTER — HOSPITAL ENCOUNTER (OUTPATIENT)
Dept: INFUSION THERAPY | Facility: HOSPITAL | Age: 71
Setting detail: HOSPITAL OUTPATIENT SURGERY
Discharge: HOME | End: 2021-05-25
Attending: INTERNAL MEDICINE

## 2021-05-25 LAB
ALBUMIN SERPL-MCNC: 4.4 G/DL (ref 3.5–5)
ALBUMIN/GLOB SERPL: 1.1 {RATIO} (ref 1.4–2.6)
ALP SERPL-CCNC: 45 U/L (ref 43–160)
ALT SERPL-CCNC: 28 U/L (ref 10–40)
ANION GAP SERPL CALC-SCNC: 13 MMOL/L (ref 8–19)
APTT BLD: 23.6 S (ref 22.2–34.2)
AST SERPL-CCNC: 25 U/L (ref 15–50)
BASE EXCESS BLD CALC-SCNC: -0.5 MMOL/L
BASE EXCESS BLD CALC-SCNC: -0.8 MMOL/L
BASE EXCESS BLD CALC-SCNC: -1.8 MMOL/L
BASOPHILS # BLD AUTO: 0.01 10*3/UL (ref 0–0.2)
BASOPHILS NFR BLD AUTO: 0.3 % (ref 0–3)
BILIRUB SERPL-MCNC: 0.35 MG/DL (ref 0.2–1.3)
BNP SERPL-MCNC: 114 PG/ML (ref 0–900)
BUN SERPL-MCNC: 25 MG/DL (ref 5–25)
BUN/CREAT SERPL: 23 {RATIO} (ref 6–20)
CALCIUM SERPL-MCNC: 9.2 MG/DL (ref 8.7–10.4)
CHLORIDE SERPL-SCNC: 102 MMOL/L (ref 99–111)
CHOLEST SERPL-MCNC: 158 MG/DL (ref 107–200)
CHOLEST/HDLC SERPL: 3.2 {RATIO} (ref 3–6)
COHGB MFR BLD: 0.3 % (ref 0–1.5)
CONV ABS IMM GRAN: 0.02 10*3/UL (ref 0–0.2)
CONV ALLEN'S TEST: ABNORMAL
CONV CO2: 27 MMOL/L (ref 22–32)
CONV FHHB: 1.7 % (ref 0–5)
CONV FHHB: 25.8 % (ref 0–5)
CONV FHHB: 29.6 % (ref 0–5)
CONV FIO2: 28 % (ref 21–100)
CONV IMMATURE GRAN: 0.6 % (ref 0–1.8)
CONV SITE: ABNORMAL
CONV TOTAL PROTEIN: 8.3 G/DL (ref 6.3–8.2)
CREAT UR-MCNC: 1.09 MG/DL (ref 0.5–0.9)
DEPRECATED RDW RBC AUTO: 42.9 FL (ref 36.4–46.3)
EOSINOPHIL # BLD AUTO: 0.06 10*3/UL (ref 0–0.7)
EOSINOPHIL # BLD AUTO: 1.8 % (ref 0–7)
ERYTHROCYTE [DISTWIDTH] IN BLOOD BY AUTOMATED COUNT: 13.1 % (ref 11.7–14.4)
GFR SERPLBLD BASED ON 1.73 SQ M-ARVRAT: 52 ML/MIN/{1.73_M2}
GLOBULIN UR ELPH-MCNC: 3.9 G/DL (ref 2–3.5)
GLUCOSE SERPL-MCNC: 96 MG/DL (ref 65–99)
HBA1C MFR BLD: 11.4 % (ref 11.7–14.6)
HBA1C MFR BLD: 11.5 % (ref 11.7–14.6)
HBA1C MFR BLD: 11.6 % (ref 11.7–14.6)
HCO3 BLDA-SCNC: 23.4 MMOL/L (ref 22–26)
HCO3 BLDA-SCNC: 24.1 MMOL/L (ref 22–26)
HCO3 BLDA-SCNC: 25 MMOL/L (ref 22–26)
HCT VFR BLD AUTO: 35.2 % (ref 37–47)
HDLC SERPL-MCNC: 50 MG/DL (ref 40–60)
HGB BLD-MCNC: 11.9 G/DL (ref 12–16)
INR PPP: 0.87 (ref 2–3)
LABORATORY COMMENT REPORT: ABNORMAL
LDLC SERPL CALC-MCNC: 93 MG/DL (ref 70–100)
LITERS PER MINUTE: 2 L/MIN
LYMPHOCYTES # BLD AUTO: 0.99 10*3/UL (ref 1–5)
LYMPHOCYTES NFR BLD AUTO: 30.3 % (ref 20–45)
Lab: ABNORMAL
MCH RBC QN AUTO: 30.1 PG (ref 27–31)
MCHC RBC AUTO-ENTMCNC: 33.8 G/DL (ref 33–37)
MCV RBC AUTO: 88.9 FL (ref 81–99)
METHGB MFR BLD: 0.2 % (ref 0–1.5)
METHGB MFR BLD: 0.2 % (ref 0–1.5)
METHGB MFR BLD: 0.3 % (ref 0–1.5)
MONOCYTES # BLD AUTO: 0.33 10*3/UL (ref 0.2–1.2)
MONOCYTES NFR BLD AUTO: 10.1 % (ref 3–10)
NEUTROPHILS # BLD AUTO: 1.86 10*3/UL (ref 2–8)
NEUTROPHILS NFR BLD AUTO: 56.9 % (ref 30–85)
NRBC CBCN: 0 % (ref 0–0.7)
OSMOLALITY SERPL CALC.SUM OF ELEC: 290 MOSM/KG (ref 273–304)
OXYHGB MFR BLD: 69.9 % (ref 94–98)
OXYHGB MFR BLD: 73.6 % (ref 94–98)
OXYHGB MFR BLD: 97.8 % (ref 94–98)
PCO2 BLD: 40.7 MM[HG] (ref 35–45)
PCO2 BLD: 41.6 MM[HG] (ref 35–45)
PCO2 BLD: 44.6 MM[HG] (ref 35–45)
PH UR: 7.37 [PH] (ref 7.35–7.45)
PH UR: 7.37 [PH] (ref 7.35–7.45)
PH UR: 7.39 [PH] (ref 7.35–7.45)
PLATELET # BLD AUTO: 169 10*3/UL (ref 130–400)
PMV BLD AUTO: 9.7 FL (ref 9.4–12.3)
PO2 BLD: 145 MM[HG] (ref 0–500)
PO2 BLD: 145 MM[HG] (ref 0–500)
PO2 BLD: 170.4 MM[HG] (ref 80–100)
PO2 BLD: 609 MM[HG] (ref 0–500)
PO2 BLD: <40.5 MM[HG] (ref 80–100)
PO2 BLD: <40.5 MM[HG] (ref 80–100)
POTASSIUM SERPL-SCNC: 3.7 MMOL/L (ref 3.5–5.3)
PROTHROMBIN TIME: 9.7 S (ref 9.4–12)
RBC # BLD AUTO: 3.96 10*6/UL (ref 4.2–5.4)
SAO2 % BLDCOA: 70.3 % (ref 95–99)
SAO2 % BLDCOA: 74 % (ref 95–99)
SAO2 % BLDCOA: 98.3 % (ref 95–99)
SODIUM SERPL-SCNC: 138 MMOL/L (ref 135–147)
SPECIMEN SOURCE: ABNORMAL
T4 FREE SERPL-MCNC: 1.1 NG/DL (ref 0.9–1.8)
TRIGL SERPL-MCNC: 76 MG/DL (ref 40–150)
TSH SERPL-ACNC: 3.39 M[IU]/L (ref 0.27–4.2)
VLDLC SERPL-MCNC: 15 MG/DL (ref 5–37)
WBC # BLD AUTO: 3.27 10*3/UL (ref 4.8–10.8)

## 2021-05-28 VITALS
TEMPERATURE: 97.9 F | OXYGEN SATURATION: 97 % | BODY MASS INDEX: 28.93 KG/M2 | HEIGHT: 66 IN | TEMPERATURE: 98 F | HEART RATE: 55 BPM | DIASTOLIC BLOOD PRESSURE: 60 MMHG | SYSTOLIC BLOOD PRESSURE: 118 MMHG | DIASTOLIC BLOOD PRESSURE: 63 MMHG | OXYGEN SATURATION: 98 % | HEART RATE: 90 BPM | BODY MASS INDEX: 29.09 KG/M2 | RESPIRATION RATE: 15 BRPM | WEIGHT: 181 LBS | RESPIRATION RATE: 15 BRPM | HEIGHT: 66 IN | WEIGHT: 180 LBS | SYSTOLIC BLOOD PRESSURE: 135 MMHG

## 2021-05-28 VITALS
RESPIRATION RATE: 14 BRPM | DIASTOLIC BLOOD PRESSURE: 72 MMHG | BODY MASS INDEX: 28.12 KG/M2 | HEART RATE: 65 BPM | HEIGHT: 66 IN | SYSTOLIC BLOOD PRESSURE: 149 MMHG | TEMPERATURE: 98 F | OXYGEN SATURATION: 100 % | WEIGHT: 175 LBS

## 2021-05-28 VITALS
BODY MASS INDEX: 28.28 KG/M2 | TEMPERATURE: 97.7 F | DIASTOLIC BLOOD PRESSURE: 64 MMHG | RESPIRATION RATE: 17 BRPM | HEART RATE: 69 BPM | HEIGHT: 66 IN | SYSTOLIC BLOOD PRESSURE: 114 MMHG | OXYGEN SATURATION: 98 % | WEIGHT: 176 LBS

## 2021-05-28 NOTE — PROGRESS NOTES
Patient: MORE GAYTAN     Acct: [id number]     Report: #[id number]  UNIT #: [id number]     : 1950    Encounter Date:10/29/2020  PRIMARY CARE: EDUARDO PRATHER  ***Signed***  --------------------------------------------------------------------------------------------------------------------  Chief Complaint      Encounter Date      Oct 29, 2020            Primary Care Provider      EDUARDO PRATHER            Referring Provider      DARINEL ALLEN            Patient Complaint      Patient is complaining of      PT here today for F/U, CT results         VITALS      Height 66 in / 167.64 cm      Weight 180 lbs  / 81.551960 kg      BSA 1.91 m2      BMI 29.1 kg/m2      Temperature 97.9 F / 36.61 C - Temporal      Pulse 90      Respirations 15      Blood Pressure 118/60 Sitting, Left Arm      Pulse Oximetry 97%, room air            HPI      The patient is a 70 year old female patient of 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 Allen and was seen by Dr. Esquivel     back on 2020 after having extensive cardiac workup for pulmonary     evaluation of her symptoms.  The patient states that she has been having     worsening shortness of breath for about five years now and is slowly worsening.     The patient states she is only able to climb up about one flight of stairs and     has to stop and rest.  The patient states the shortness of breath is worse with     exertion, moderate in severity and improved with rest.  The patient states that     at times it is accompanied with intermittent dizziness and fatigue.  The patient    had reported last visit she did snore at night and had daytime excessive     sleepiness and fatigue. The patient had a sleep study completed and patient     states she just started on a CPAP two weeks ago.  The patient reports she also     has a history of SjÃ¶gren's disease and chronic leukopenia, but is not currently     under the care of a  rheumatologist.  The patient's pulmonary function test back     in 08/2020 did not show any obstructive or restrictive defect, but did have an     isolated decreased diffusion capacity at 67%.  A VQ scan was performed prior to     patient's referral to our office which showed low probability of PE. The patient    did have a chest CT scan completed on 10/22/2020 and this showed atelectatic     changes in the right middle lobe, lingula and left lower lobe.  There was some c    oronary artery calcification and a 6 mm right lower lobe nodule recommending a 6    month follow up.  The patient denies any cough, wheezing, fever, chills, night     sweats, hemoptysis, purulent sputum production, chest pain, chest tightness,     swollen glands in the head and neck, nausea, vomiting or diarrhea.  The patient     denies any headaches, myalgias, sore throat, changes in sense of taste and/or     smell or any other coronavirus or flu-like symptoms.  The patient denies any leg    swelling, orthopnea or paroxysmal nocturnal dyspnea. The patient states that she    is under the care of Dr. Valle, cardiologist for a history of palpitations and     racing heart rate. The patient did have an echocardiogram and a stress test     performed that were ordered by Dr. Valle.  The patient's echocardiogram that was    completed on 09/08/2020 showed normal left ventricular chamber size, normal     systolic function with an estimated EF of 65%.  There was no significant     regional wall motion abnormalities identified. She had fibrocalcific changes of     the mitral valve with mild mitral valve prolapse and mild mitral valve     regurgitation. The patient did have a stress test also completed on 01/30/202     and it came back as a normal study.  The patient had normal left systolic     function EF of 78%, normal wall motion. There is no chest pain or EKG changes.     The patient states she is able to perform her ADLs.            I have personally  reviewed the review of systems, past family, social, surgical     and medical histories and I agree with those as entered in the chart.      Copies To:   Sean Christiansen      Constitutional:  Denies: Fatigue, Fever, Weight gain, Weight loss, Chills,     Insomnia, Other      Respiratory/Breathing:  Complains of: Shortness of air; Denies: Wheezing, Cough,    Hemoptysis, Pleuritic pain, Other      Endocrine:  Denies: Polydipsia, Polyuria, Heat/cold intolerance, Abnorml     menstrual pattern, Diabetes, Other      Eyes:  Denies: Blurred vision, Vision Changes, Other      Ears, nose, mouth, throat:  Denies: Mouth lesions, Thrush, Throat pain,     Hoarseness, Allergies/Hay Fever, Post Nasal Drip, Headaches, Recent Head Injury,    Nose Bleeding, Neck Stiffness, Thyroid Mass, Hearing Loss, Ear Fullness, Dry     Mouth, Nasal or Sinus Pain, Dry Lips, Nasal discharge, Nasal congestion, Other      Cardiovascular:  Denies: Palpitations, Syncope, Claudication, Chest Pain, Wake     up Gasping for air, Leg Swelling, Irregular Heart Rate, Cyanosis, Dyspnea on     Exertion, Other      Gastrointestinal:  Denies: Nausea, Constipation, Diarrhea, Abdominal pain,     Vomiting, Difficulty Swallowing, Reflux/Heartburn, Dysphagia, Jaundice,     Bloating, Melena, Bloody stools, Other      Genitourinary:  Denies: Urinary frequency, Incontinence, Hematuria, Urgency,     Nocturia, Dysuria, Testicular problems, Other      Musculoskeletal:  Denies: Joint Pain, Joint Stiffness, Joint Swelling, Myalgias,    Other      Hematologic/lymphatic:  DENIES: Lymphadenopathy, Bruising, Bleeding tendencies,     Other      Neurological:  Denies: Headache, Numbness, Weakness, Seizures, Other      Psychiatric:  Denies: Anxiety, Appropriate Effect, Depression, Other      Sleep:  No: Excessive daytime sleep, Morning Headache?, Snoring, Insomnia?, Stop    breathing at sleep?, Other      Integumentary:  Denies: Rash, Dry skin, Skin Warm to Touch, Other       Immunologic/Allergic:  Denies: Latex allergy, Seasonal allergies, Asthma,     Urticaria, Eczema, Other      Immunization status:  No: Up to date            FAMILY/SOCIAL/MEDICAL HX      Surgical History:  Yes: Abdominal Surgery (URETHAL SLING )      Stroke - Family Hx:  Grandparent      Heart - Family Hx:  Grandparent      Cancer/Type - Family Hx:  Aunt, Uncle      Is Father Still Living?:  No      Is Mother Still Living?:  No      Social History:  No Tobacco Use, No Alcohol Use, No Recreational Drug use      Smoking status:  Never smoker      Anticoagulation Therapy:  No      Antibiotic Prophylaxis:  No      Medical History:  Yes: Allergies, High Blood Pressure, High Cholesterol,     Miscellaneous Medical/oth (auto immune, mitralvalve prolapse); No: Blood     Disease, Chemotherapy/Cancer, Deafness or Ringing Ears, Diabetes, Shortness Of     Breath, Sinus Trouble      Psychiatric History      none            PREVENTION      Hx Influenza Vaccination:  Yes      Date Influenza Vaccine Given:  Sep 1, 2020      Influenza Vaccine Declined:  No      2 or More Falls in Past Year?:  No      Fall Past Year with Injury?:  No      Hx Pneumococcal Vaccination:  Yes      Encouraged to follow-up with:  PCP regarding preventative exams.      Chart initiated by      Vianey Hung CMA            ALLERGIES/MEDICATIONS      Allergies:        Coded Allergies:             SULFAMETHOXAZOLE (Verified  Allergy, Intermediate, Rash, 10/27/20)           TRIMETHOPRIM (Verified  Allergy, Intermediate, Rash, 10/27/20)      Uncoded Allergies:             FLU VACCINE (Adverse Reaction, Severe, NEUROLIGICAL  SYMTOMS, 8/4/15)      Medications    Last Reconciled on 10/29/20 09:57 by ANAYA MORALES,       Ezetimibe (Zetia) 10 Mg Tablet      10 MG PO QDAY, #30 TAB 0 Refills         Reported         9/22/20       Rosuvastatin Calcium (Crestor*) 5 Mg Tab      5 MG PO HS, #30 TAB 0 Refills         Reported         9/22/20       Aspirin EC (Aspirin  EC) 81 Mg Tablet.dr      81 MG PO QDAY, #30 TAB.EC 0 Refills         Reported         9/22/20       Loratadine (Loratadine) 10 Mg Tablet      10 MG PO HS, #30 TAB 0 Refills         Reported         9/22/20       Hctz (hydroCHLOROthiazide) 12.5 Mg Capsule      12.5 MG PO QDAY, #30 CAP 0 Refills         Reported         9/22/20       Metoprolol Succinate (Metoprolol Succinate) 50 Mg Tab.er.24h      37.5 MG PO HS, #30 TAB 0 Refills         Reported         9/22/20       Lisinopril* (Lisinopril*) 5 Mg Tablet      5 MG PO QDAY, #30 TAB 0 Refills         Reported         9/22/20       Acetaminophen (Tylenol) 325 Mg Tablet      650 MG PO Q4H for [id number], TAB         Reported         8/4/15       Ubidecarenone (Co Q-10) 200 Mg Cap      200 MG PO HS, CAP         Reported         8/4/15      Current Medications      Current Medications Reviewed 10/29/20            EXAM      Vital Signs Reviewed.      General:  WDWN, Alert, NAD.      HEENT: PERRL, EOMI.  OP, nares clear, no sinus tenderness.      Neck: Supple, no JVD, no thyromegaly.      Lymph: No axillary, cervical, supraclavicular lymphadenopathy noted bilaterally.      Chest: Good aeration, clear to auscultation bilaterally, no wheezes, rales or     rhonchi appreciated, normal work of breathing noted.        CV: RRR, no MGR, pulses 2+, equal.        Abd: Soft, NT, ND, +BS, no HSM.      EXT: No clubbing, no cyanosis, no edema, no joint tenderness.        Neuro:  A  Skin: No rashes or lesions.      Vtials      Vitals:             Height 66 in / 167.64 cm           Weight 180 lbs  / 81.988690 kg           BSA 1.91 m2           BMI 29.1 kg/m2           Temperature 97.9 F / 36.61 C - Temporal           Pulse 90           Respirations 15           Blood Pressure 118/60 Sitting, Left Arm           Pulse Oximetry 97%, room air            REVIEW      Results Reviewed      PCCS Results Reviewed?:  Yes Prev Lab Results, Yes Prev Radiology Results, Yes     Previous M[facility] Records       Lab Results      I reviewed Dr. Esquivel's last office note.  I reviewed pulmonary function test     that shows an isolated decreased diffusion capacity of 67%. I also reviewed     patient's chest CT scan completed on 10/22/2020 that showed a 6 mm right lower     lobe nodule and some atelectasis.  I reviewed patient's echocardiogram and     stress test completed by Dr. Allen.              Patient: ANABELL GAYTAN   Acct: #[id number]   Report: #[id number]            UNIT #: [id number]    ADMIT/REGISTRATION DATE: 20      LOCATION:[facility]     : 1950            PROVIDERS      ADMITTING:     FAMILY:  MD NONE         OTHER:       DICTATING:  DARINEL ALLEN MD            REASON FOR VISIT:  SOA/MVR            *******Signed******                                     [facility]                          Health Information Management Services                            [place], [place]  [address]               __________________________________________________________________________             Patient Name:                   Attending Physician:      Anabell Gaytan M.D.             Patient Visit # MR #            Admit Date  Disch Date     Location      [id number]    [id number]      2020                 [facility]- -             Date of Birth      1950      __________________________________________________________________________      835 - DIAGNOSTIC REPORT             TRANSESOPHAGEAL ECHOCARDIOGRAPHY             DATE OF SERVICE:      20             INDICATION:      Mitral valve regurgitation, shortness of breath.             COMMENTS:      The patient underwent intravenous sedation and topical anesthetic.  The      transesophageal echocardiography probe was easily advanced into the mid      esophagus.  Multiple images were obtained in multiple planes.  The probe was      then advanced in to the stomach and retrogastric views were obtained.       Finally, the probe was withdrawn into the mid-esophagus.  Agitated saline      contrast was injected and then the probe was withdrawn, visualizing the      descending aorta.  The following was observed.             1.  MITRAL VALVE:       Mild fibrocalcific changes noted with mild mitral          valve prolapse and mild mitral valve regurgitation.      2.  AORTIC VALVE:       Trileaflet, normal.  No stenosis or regurgitation          identified.      3.  TRICUSPID VALVE:    Normal.      4.  PULMONIC VALVE:     Normal.      5.  AORTIC ROOT:        Normal in size and motion.      6.  LEFT ATRIUM:        Normal.  No masses seen.  No evidence of left atrial          thrombi in the appendages either.      7.  LEFT VENTRICLE:     The left ventricular chamber size is normal.  The          left ventricular systolic function is normal with an estimated ejection          fraction of 65%.  No significant regional wall motion abnormalities are          identified.      8.  RIGHT VENTRICLE:    Normal.      9.  RIGHT ATRIUM:       [place] network noted.      10. PERICARDIUM:        No effusion.             Doppler pulsed wave, continuous wave, and color Doppler evaluation was      performed.  There is mild mitral valve regurgitation present.             CONCLUSION:      1.  Normal left ventricular chamber size with normal systolic function with          an estimated ejection fraction of 65%.  No significant regional wall          motion abnormalities are identified.      2.  Fibrocalcific changes of the mitral valve with mild mitral valve prolapse          and mild mitral valve regurgitation.             To be electronically signed in [employer]      [id number] DARINEL ALLEN M.D.             PM:[email or web address]      D:  09/15/2020 17:12      T:  09/15/2020 18:08      #[id number]             CC: RAND FISCHER             Until signed, this is an unconfirmed preliminary report that may contain      errors and is subject to change.                    Until signed, this is an unconfirmed preliminary report that may contain      errors and is subject to change.                     <Electronically signed by DARINEL ALLEN MD>                20 0934               DARINEL ALLEN MD                                                                  MEHPR:JTJ      D:09/15/20 1712      Radiographic Results               [facility]                PACS RADIOLOGY REPORT            Patient: MORE GAYTAN   Acct: #[id number]   Report: #[id number]            UNIT #: [id number]    DOS: 10/22/20 1041      INSURANCE:MEDICARE PART A   LOCATION:CT     : 1950            PROVIDERS      ADMITTING:     ATTENDING: MIGUEL FOLEY      FAMILY:  EDUARDO PRATHER   ORDERING:  MIGUEL FOLEY         OTHER:    DICTATING:  Albert Vogt MD            REQ #:[id number]   EXAM:CHWO - CT CHEST without CONTRAST      REASON FOR EXAM:  SHORT OF AIR      REASON FOR VISIT:  SHORT OF AIR            *******Signed******         PROCEDURE:   CT CHEST WITHOUT CONTRAST             COMPARISON:   None.             INDICATIONS:   DYSPNEA, SICCA SYNDROME             TECHNIQUE:   CT images were created without the administration of contrast     material.               PROTOCOL:     Standard imaging protocol performed                RADIATION:     DLP: 642mGy*cm          Automated exposure control was utilized to minimize radiation dose.              FINDINGS:         There are atelectatic changes in the right middle lobe and lingula and left     lower lobe areas. There       is no jen pulmonary consolidation.  There are no pleural effusions.  There is     calcification       within the mediastinum and right hilar area compatible with prior granulomatous     exposure.  There is       some coronary artery calcification.             Lower slices through the upper abdomen reveal no significant abnormality.             There are some  degenerative changes involving the thoracic spine.             CONCLUSION:         1. Atelectatic changes in right middle lobe, lingula and left lower lobe area.      2. There is some coronary artery calcification.              JUMA WHYTE MD             Electronically Signed and Approved By: JUMA WHYTE MD on 10/22/2020 at 14:31                               Until signed, this is an unconfirmed preliminary report that may contain      errors and is subject to change.                                              KAMCR:      D:10/22/20 1431      PFT Results      Patient: ANABELL GAYTAN   Acct: #[id number]   Report: #[id number]            UNIT #: [id number]    DOS:       LOCATION:[facility]     : 1950            PROVIDERS      ADMITTING:     FAMILY:  DARINEL ALLEN         OTHER:       DICTATING:  Sean Christiansen DO            REASON FOR VISIT:  SOA            *******Signed******                                    [facility]                          Health Information Management Services                            [place], [place]  [address]               __________________________________________________________________________             Patient Name:                   Attending Physician:      Anabell Gaytan M.D.             Patient Visit # MR #            Admit Date  Disch Date     Location      [id number]    [id number]      2020                 [facility]- -             Date of Birth      1950      __________________________________________________________________________      821 - DIAGNOSTIC REPORT             PULMONARY FUNCTION TEST             DATE OF TEST:      2020.             SPIROMETRY:      FEV1/FVC ratio is 76%.      FEV1 is 90% of predicted.      Forced vital capacity is 90% of predicted.      No response to bronchodilator therapy seen.             LUNG VOLUMES:      Total lung capacity is 89% of predicted.       Residual volume is 77% of predicted.             DIFFUSION:      DLCO is 67% of predicted.             IMPRESSION:      1.  Spirometry shows no evidence of obstruction.      2.  There is no significant response to bronchodilator therapy seen.      3.  Lung volumes show no evidence of restriction.      4.  Diffusion capacity is mildly decreased.      5.  Given the mild decrease in diffusion capacity, this can be seen in the          presence of CHF, anemia, interstitial lung disease, emphysema without          obstruction, and pulmonary vascular disease.  Please correlate clinically.             To be electronically signed in [facility]      [id number] KATE CHRISTIANSEN D.O.             WC:josé miguel      D:  08/25/2020 10:04      T:  08/25/2020 12:05      #[id number]             Until signed, this is an unconfirmed preliminary report that may contain      errors and is subject to change.                   Until signed, this is an unconfirmed preliminary report that may contain      errors and is subject to change.                     <Electronically signed by Kate Christiansen DO>                08/25/20 1258               Kate Christiansen:PIPE      D:08/25/20 1004            Assessment      Lung nodule - R91.1            Dyspnea - R06.00            Notes      Discontinued Medications      * Amoxicillin/Clavulanic Acid 875/125 (Augmentin 875/125) 1 EACH TABLET: 875 MG       PO BID 7 Days #14      New Diagnostics      * Chest W/O Cont CT, 6 Months         Dx: Lung nodule - R91.1      IMPRESSION:      1.  Dyspnea on exertion.      2.  Obstructive sleep apnea, patient now on nightly CPAP.      3.  SjÃ¶gren's disease.      4. Fatigue.      5. Snoring.         6. Insomnia.      7. Chronic leukopenia.      8. Chronic normocytic anemia. The patient reports that she has had a colonoscopy    and that she has seen a urologist for microscopic hematuria.      9. Never  smoker.        10. Coronary artery calcifications. The patient under the care of Dr. Valle.        11.  Pulmonary nodule, 6 mm in right lower lobe.              PLAN:      1.  The patient's chest CT scan showing 6 mm right lower lobe nodule and     atelectatic changes in the right middle lobe, lingula and left lower lobe area.     I will repeat a chest CT again in six months to reevaluate pulmonary nodule.      2.  The patient's CBC does show a mild anemia.  The patient states she is     following with her PCP and that she has always had mild anemia.      3. For obstructive sleep apnea, the patient is to continue CPAP at current     settings and to clean mask and tubing daily.      4. The patient is advised to follow up with Dr. Valle, cardiologist as     scheduled.      5. Patient is advised to call the office, 911 or go to the ER with any new or     worsening symptoms.      6.  For patient's shortness of breath with exertion, the patient had PFTs, chest    CT, echocardiogram, stress test, and labs completed, please see all scanned     documents.  The patient just restarted a CPAP machine two weeks ago and patient     is going to see if this helps with her symptoms.      7. The patient reports she is up-to-date with her flu and pneumonia vaccines.      8. Follow up as already scheduled in November, 2020,sooner if needed.            Patient Education      Patient Education Provided:  Acute Bronchitis      Time Spent:  > 50% /Coord Care            Electronically signed by ANAYA MORALES [facility]  11/02/2020 13:17       Disclaimer: Converted document may not contain table formatting or lab diagrams. Please see [employer] for the authenticated document.

## 2021-05-28 NOTE — PROGRESS NOTES
Patient: MORE GAYTAN     Acct: [id number]     Report: #[id number]  UNIT #: [id number]     : 1950    Encounter Date:2021  PRIMARY CARE: EDUARDO PRATHER  ***Signed***  --------------------------------------------------------------------------------------------------------------------  Chief Complaint      Encounter Date      2021            Primary Care Provider      EDUARDO PRATHER            Referring Provider      DARINEL ALLEN            Patient Complaint      Patient is complaining of      f/u chest ct, dyspenia            VITALS      Height 5 ft 6 in / 167.64 cm      Weight 176 lbs  / 79.553978 kg      BSA 1.89 m2      BMI 28.4 kg/m2      Temperature 97.7 F / 36.5 C - Temporal      Pulse 69      Respirations 17      Blood Pressure 114/64 Sitting, Left Arm      Pulse Oximetry 98%, room air            HPI      The patient is a 71 year old female here for follow up today of shortness of     breath.             CT scan of the chest obtained in April was unremarkable. She has history of     Sjogren's disease and her pulmonary nodule was stable. She has significant     shortness of breath present in a daily basis worse with exertion, better with     rest, worse with walking on a grade or when she has to do activities such as     making the bed and doing chores around the house. In the kitchen she becomes     very winded and has to stop what she is doing. These symptoms have been going on    for the course of past several years and have progressed to the point where they    impact her day to day activities and living. She has no associated chest pain,     no heart palpitations and no lower extremity edema at this time. She has no     evidence of any interstitial lung disease seen. She has been on inhalers with no    improvement in symptoms.            ROS      Constitutional:  Denies: Fatigue, Fever, Weight gain, Weight loss, Chills,     Insomnia, Other      Respiratory/Breathing:   Complains of: Shortness of air; Denies: Wheezing, Cough,    Hemoptysis, Pleuritic pain, Other      Endocrine:  Denies: Polydipsia, Polyuria, Heat/cold intolerance, Abnorml     menstrual pattern, Diabetes, Other      Eyes:  Denies: Blurred vision, Vision Changes, Other      Ears, nose, mouth, throat:  Denies: Mouth lesions, Thrush, Throat pain,     Hoarseness, Allergies/Hay Fever, Post Nasal Drip, Headaches, Recent Head Injury,    Nose Bleeding, Neck Stiffness, Thyroid Mass, Hearing Loss, Ear Fullness, Dry     Mouth, Nasal or Sinus Pain, Dry Lips, Nasal discharge, Nasal congestion, Other      Cardiovascular:  Denies: Palpitations, Syncope, Claudication, Chest Pain, Wake     up Gasping for air, Leg Swelling, Irregular Heart Rate, Cyanosis, Dyspnea on     Exertion, Other      Gastrointestinal:  Denies: Nausea, Constipation, Diarrhea, Abdominal pain,     Vomiting, Difficulty Swallowing, Reflux/Heartburn, Dysphagia, Jaundice,     Bloating, Melena, Bloody stools, Other      Genitourinary:  Denies: Urinary frequency, Incontinence, Hematuria, Urgency,     Nocturia, Dysuria, Testicular problems, Other      Musculoskeletal:  Denies: Joint Pain, Joint Stiffness, Joint Swelling, Myalgias,    Other      Hematologic/lymphatic:  DENIES: Lymphadenopathy, Bruising, Bleeding tendencies,     Other      Neurological:  Denies: Headache, Numbness, Weakness, Seizures, Other      Psychiatric:  Denies: Anxiety, Appropriate Effect, Depression, Other      Sleep:  No: Excessive daytime sleep, Morning Headache?, Snoring, Insomnia?, Stop    breathing at sleep?, Other      Integumentary:  Denies: Rash, Dry skin, Skin Warm to Touch, Other      Immunologic/Allergic:  Denies: Latex allergy, Seasonal allergies, Asthma,     Urticaria, Eczema, Other      Immunization status:  No: Up to date            FAMILY/SOCIAL/MEDICAL HX      Surgical History:  Yes: Abdominal Surgery (URETHAL SLING )      Stroke - Family Hx:  Grandparent      Heart - Family Hx:   Grandparent      Cancer/Type - Family Hx:  Aunt, Uncle      Is Father Still Living?:  No      Is Mother Still Living?:  No      Social History:  No Tobacco Use, No Alcohol Use, No Recreational Drug use      Smoking status:  Never smoker      Anticoagulation Therapy:  No      Antibiotic Prophylaxis:  No      Medical History:  Yes: Allergies, High Blood Pressure, High Cholesterol,     Miscellaneous Medical/oth (auto immune, mitralvalve prolapse); No: Blood     Disease, Chemotherapy/Cancer, Deafness or Ringing Ears, Diabetes, Shortness Of     Breath, Sinus Trouble      Psychiatric History      none            PREVENTION      Hx Influenza Vaccination:  Yes      Date Influenza Vaccine Given:  Sep 1, 2020      Influenza Vaccine Declined:  No      2 or More Falls in Past Year?:  No      Fall Past Year with Injury?:  No      Hx Pneumococcal Vaccination:  Yes      Encouraged to follow-up with:  PCP regarding preventative exams.      Chart initiated by      Jessica Felix CMA            ALLERGIES/MEDICATIONS      Allergies:        Coded Allergies:             SULFAMETHOXAZOLE (Verified  Allergy, Intermediate, Rash, 4/26/21)           TRIMETHOPRIM (Verified  Allergy, Intermediate, Rash, 4/26/21)      Medications    Last Reconciled on 4/26/21 15:40 by KATE PADILLA MD      Multivitamins (Multi-Vitamin) 1 Each Tablet      1 TAB PO QDAY, #30 TAB 0 Refills         Reported         1/13/21       Ezetimibe (Zetia) 10 Mg Tablet      10 MG PO QDAY, #30 TAB 0 Refills         Reported         9/22/20       Rosuvastatin Calcium (Crestor*) 5 Mg Tab      5 MG PO HS, #30 TAB 0 Refills         Reported         9/22/20       Aspirin EC (Aspirin EC) 81 Mg Tablet.dr      81 MG PO QDAY, #30 TAB.EC 0 Refills         Reported         9/22/20       Loratadine (Loratadine) 10 Mg Tablet      10 MG PO HS, #30 TAB 0 Refills         Reported         9/22/20       Hctz (hydroCHLOROthiazide) 12.5 Mg Capsule      12.5 MG PO QDAY, #30 CAP 0 Refills          Reported         9/22/20       Metoprolol Succinate (Metoprolol Succinate) 50 Mg Tab.er.24h      37.5 MG PO HS, #30 TAB 0 Refills         Reported         9/22/20       Lisinopril* (Lisinopril*) 5 Mg Tablet      5 MG PO QDAY, #30 TAB 0 Refills         Reported         9/22/20       Acetaminophen (Tylenol) 325 Mg Tablet      650 MG PO Q4H for [id number], TAB         Reported         8/4/15       Ubidecarenone (Co Q-10) 200 Mg Cap      200 MG PO HS, CAP         Reported         8/4/15      Current Medications      Current Medications Reviewed 4/26/21            EXAM      Vital Signs Reviewed      Gen: WDWN, Alert, NAD.        HEENT:  PERRL, EOMI.  OP, nares clear, no sinus tenderness.      Neck:  Supple, no JVD, no thyromegaly.      Lymph: No axillary, cervical, supraclavicular lymphadenopathy noted bilaterally.      Chest:  Good aeration, clear to auscultation bilaterally, tympanic to percussion    bilaterally, no work of breathing noted.      CV:  RRR, no MGR, pulses 2+, equal.      Abd:  Soft, NT, ND, + BS, no HSM.      EXT:  No clubbing, no cyanosis, no edema, no joint tenderness.       Neuro:  A  Skin: No rashes or lesions.      Vtials      Vitals:             Height 5 ft 6 in / 167.64 cm           Weight 176 lbs  / 79.878132 kg           BSA 1.89 m2           BMI 28.4 kg/m2           Temperature 97.7 F / 36.5 C - Temporal           Pulse 69           Respirations 17           Blood Pressure 114/64 Sitting, Left Arm           Pulse Oximetry 98%, room air            REVIEW      Results Reviewed      PCCS Results Reviewed?:  Yes Prev Lab Results, Yes Prev Radiology Results, Yes     [facility]            Assessment      SOB (shortness of breath) - R06.02            Notes      New Diagnostics      * CBC, Month         Dx: SOB (shortness of breath) - R06.02      * PFT-Comp, PrePost,DLCO,BodyBox, Week         Dx: SOB (shortness of breath) - R06.02      ASSESSMENT:      1. Decreased DLCO seen on  pulmonary function test. Repeat pulmonary function     test at this time.       2. Given the patient's symptoms out of proportion to her previous pulmonary     function test and CT imaging, I am concerned about the possibility of pulmonary     hypertension.       3. ? Pulmonary hypertension. Repeat pulmonary function test. I spoke with Dr. Valle and if pulmonary function test are unremarkable and the patient still has     isolated decreased DLCO, we will send for right heart catheterization.       4. Anemia. Repeat CBC, last hemoglobin was 11.       5. Pulmonary nodule. 5 mm and stable. We will continue to follow.       6. Sjogren's disease. No evidence of any active Sjogren's disease at this time.       7. I have personally reviewed laboratory data, imaging and previous medical     records.            Patient Education      Education resources provided:  Yes      Patient Education Provided:  Pulmonary Hypertension            Electronically signed by Sean Christiansen  05/12/2021 14:11       Disclaimer: Converted document may not contain table formatting or lab diagrams. Please see [employer] for the authenticated document.

## 2021-05-28 NOTE — PROGRESS NOTES
Patient: MORE GAYTAN     Acct: [id number]     Report: #[id number]  UNIT #: [id number]     : 1950    Encounter Date:2020  PRIMARY CARE: EDUARDO PRATHER  ***Signed***  --------------------------------------------------------------------------------------------------------------------  Chief Complaint      Encounter Date      Sep 22, 2020            Primary Care Provider      EDUARDO PRATHER            Referring Provider      DARINEL ALLEN            Patient Complaint      Patient is complaining of      New pt here for soa            VITALS      Height 5 ft 6 in / 167.64 cm      Weight 175 lbs 0 oz / 79.33622 kg      BSA 1.89 m2      BMI 28.2 kg/m2      Temperature 98.0 F / 36.67 C - Temporal      Pulse 65      Respirations 14      Blood Pressure 149/72 Sitting, Right Arm      Pulse Oximetry 100%, Room air            HPI      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 Allen after having extensive cardiac work-up for shortness of     breath for pulmonary evaluation of her symptoms.  Patient says she has had     shortness of breath about 5 years now but it is slowly worsening.  She is unable    to climb a flight of stairs without having to stop at the top of the stairwell     and rest for a minute.  Her shortness of breath is accompanied by intermittent     dizziness and fatigue.  She does snore at night and has daytime somnolence and     fatigue but has never had a sleep study.  She has history of Sjogren's disease     and chronic leukopenia but is currently not under the care of a rheumatologist.     PFT showed no obstructive or restrictive defect but did have isolated decreased     diffusion capacity of 67%.  A VQ scan was performed prior to her referral which     showed low probability for PE.  The patient has tried albuterol in the past but     it did not make a difference in her dyspnea.  She denies chronic cough or     wheeze.  No  leg swelling.            ROS      Constitutional:  Denies: Fatigue, Fever, Weight gain, Weight loss, Chills, I    nsomnia, Other      Respiratory/Breathing:  Complains of: Shortness of air; Denies: Wheezing, Cough,    Hemoptysis, Pleuritic pain, Other      Endocrine:  Denies: Polydipsia, Polyuria, Heat/cold intolerance, Abnorml     menstrual pattern, Diabetes, Other      Eyes:  Denies: Blurred vision, Vision Changes, Other      Ears, nose, mouth, throat:  Denies: Mouth lesions, Thrush, Throat pain,     Hoarseness, Allergies/Hay Fever, Post Nasal Drip, Headaches, Recent Head Injury,    Nose Bleeding, Neck Stiffness, Thyroid Mass, Hearing Loss, Ear Fullness, Dry     Mouth, Nasal or Sinus Pain, Dry Lips, Nasal discharge, Nasal congestion, Other      Cardiovascular:  Complains of: Dyspnea on Exertion; Denies: Palpitations,     Syncope, Claudication, Chest Pain, Wake up Gasping for air, Leg Swelling,     Irregular Heart Rate, Cyanosis, Other      Gastrointestinal:  Denies: Nausea, Constipation, Diarrhea, Abdominal pain,     Vomiting, Difficulty Swallowing, Reflux/Heartburn, Dysphagia, Jaundice,     Bloating, Melena, Bloody stools, Other      Genitourinary:  Denies: Urinary frequency, Incontinence, Hematuria, Urgency,     Nocturia, Dysuria, Testicular problems, Other      Musculoskeletal:  Denies: Joint Pain, Joint Stiffness, Joint Swelling, Myalgias,    Other      Hematologic/lymphatic:  DENIES: Lymphadenopathy, Bruising, Bleeding tendencies,     Other      Neurological:  Denies: Headache, Numbness, Weakness, Seizures, Other      Psychiatric:  Denies: Anxiety, Appropriate Effect, Depression, Other      Sleep:  No: Excessive daytime sleep, Morning Headache?, Snoring, Insomnia?, Stop    breathing at sleep?, Other      Integumentary:  Denies: Rash, Dry skin, Skin Warm to Touch, Other      Immunologic/Allergic:  Denies: Latex allergy, Seasonal allergies, Asthma,     Urticaria, Eczema, Other      Immunization status:  No: Up  to date            FAMILY/SOCIAL/MEDICAL HX      Surgical History:  Yes: Abdominal Surgery (URETHAL SLING )      Stroke - Family Hx:  Grandparent      Heart - Family Hx:  Grandparent      Cancer/Type - Family Hx:  Aunt, Uncle      Is Father Still Living?:  No      Is Mother Still Living?:  No      Social History:  No Tobacco Use, No Alcohol Use, No Recreational Drug use      Smoking status:  Never smoker      Anticoagulation Therapy:  No      Antibiotic Prophylaxis:  No      Medical History:  Yes: Allergies, High Blood Pressure, High Cholesterol,     Miscellaneous Medical/oth (auto immune, mitralvalve prolapse); No: Blood     Disease, Chemotherapy/Cancer, Deafness or Ringing Ears, Diabetes, Shortness Of     Breath      Psychiatric History      None            PREVENTION      Hx Influenza Vaccination:  Yes      Date Influenza Vaccine Given:  Sep 1, 1988      Influenza Vaccine Declined:  Yes      2 or More Falls in Past Year?:  No      Fall Past Year with Injury?:  No      Hx Pneumococcal Vaccination:  No      Encouraged to follow-up with:  PCP regarding preventative exams.      Chart initiated by      Stacie Jean MA            ALLERGIES/MEDICATIONS      Allergies:        Coded Allergies:             Bactrim (Verified  Allergy, Intermediate, Rash, 9/22/20)      Uncoded Allergies:             FLU VACCINE (Adverse Reaction, Severe, NEUROLIGICAL  SYMTOMS, 8/4/15)      Medications    Last Reconciled on 9/22/20 11:32 by MIGUEL FOLEY,       Ezetimibe (Zetia) 10 Mg Tablet      10 MG PO QDAY, #30 TAB 0 Refills         Reported         9/22/20       Rosuvastatin Calcium (Crestor*) 5 Mg Tab      5 MG PO HS, #30 TAB 0 Refills         Reported         9/22/20       Aspirin EC (Aspirin EC) 81 Mg Tablet.dr      81 MG PO QDAY, #30 TAB.EC 0 Refills         Reported         9/22/20       Loratadine (Loratadine) 10 Mg Tablet      10 MG PO HS, #30 TAB 0 Refills         Reported         9/22/20       Hctz (hydroCHLOROthiazide) 12.5  Mg Capsule      12.5 MG PO QDAY, #30 CAP 0 Refills         Reported         9/22/20       Metoprolol Succinate (Metoprolol Succinate) 50 Mg Tab.er.24h      37.5 MG PO HS, #30 TAB 0 Refills         Reported         9/22/20       Lisinopril* (Lisinopril*) 5 Mg Tablet      5 MG PO QDAY, #30 TAB 0 Refills         Reported         9/22/20       Acetaminophen (Tylenol) 325 Mg Tablet      650 MG PO Q4H for [id number], TAB         Reported         8/4/15       Ubidecarenone (Co Q-10) 200 Mg Cap      200 MG PO HS, CAP         Reported         8/4/15      Current Medications      Current Medications Reviewed 9/22/20            EXAM      Exam:       Vital Signs Reviewed      WDWN, Alert, NAD.        HEENT:  PERRL, EOMI.  OP, nares clear, no sinus tenderness      Neck:  Supple, no JVD, no thyromegaly      Lymph: no axillary, cervical, supraclavicular lymphadenopathy noted bilaterally      Chest: good aeration, clear to auscultation bilaterally, tympanic to percussion     bilaterally, no work of breathing noted      CV: RRR, no MGR, pulses 2+, equal.      Abd:  Soft, NT, ND, + BS, no HSM      EXT:  no clubbing, no cyanosis, no edema, no joint tenderness      Neuro:  A  Skin: No rashes or lesions noted      Vtials      Vitals:             Height 5 ft 6 in / 167.64 cm           Weight 175 lbs 0 oz / 79.36749 kg           BSA 1.89 m2           BMI 28.2 kg/m2           Temperature 98.0 F / 36.67 C - Temporal           Pulse 65           Respirations 14           Blood Pressure 149/72 Sitting, Right Arm           Pulse Oximetry 100%, Room air            REVIEW      Results Reviewed      Radiographic Results      Reviewed VQ scan which was low probability for PE            Reviewed stress test report from January 2020; official conclusion from the     report is listed below      IMPRESSION:      #1 normal myocardial SPECT perfusion study with regadenoson and low-level     exercise.      2.  Normal left systolic function with ejection  fraction of 78% and normal wall     motion.      3.  No chest pain or EKG changes with regadenoson and low-level exercise      PFT Results                                       [facility]                          [facility] [employer] [facility]                            [place]  [id number]               __________________________________________________________________________             Patient Name:                   Attending Physician:      Anabell Richards M.D.             Patient Visit # MR #            Admit Date  Disch Date     Location      [id number]    [id number]      08/14/2020                 [facility]             Date of Birth      1950      __________________________________________________________________________      0821 - DIAGNOSTIC REPORT             PULMONARY FUNCTION TEST             DATE OF TEST:      08/14/2020.             SPIROMETRY:      FEV1/FVC ratio is 76%.      FEV1 is 90% of predicted.      Forced vital capacity is 90% of predicted.      No response to bronchodilator therapy seen.             LUNG VOLUMES:      Total lung capacity is 89% of predicted.      Residual volume is 77% of predicted.             DIFFUSION:      DLCO is 67% of predicted.             IMPRESSION:      1.  Spirometry shows no evidence of obstruction.      2.  There is no significant response to bronchodilator therapy seen.      3.  Lung volumes show no evidence of restriction.      4.  Diffusion capacity is mildly decreased.      5.  Given the mild decrease in diffusion capacity, this can be seen in the          presence of CHF, anemia, interstitial lung disease, emphysema without          obstruction, and pulmonary vascular disease.  Please correlate clinically.             To be electronically signed in [employer]      [id number] KATE PADILLA D.O.             WC:josé miguel      D:  08/25/2020 10:04      T:  08/25/2020 12:05      #[id number]              Until signed, this is an unconfirmed preliminary report that may contain      errors and is subject to change.                   Until signed, this is an unconfirmed preliminary report that may contain      errors and is subject to change.                     <Electronically signed by Sean Christiansen DO>                08/25/20 1258               Sean Christiansen:PIPE      D:08/25/20 1004            Assessment      Sjogren's disease - M35.00            LAKE (dyspnea on exertion) - R06.00            Insomnia - G47.00            Chronic anemia - D64.9            Leukopenia - D72.819            Notes      New Medications      * Lisinopril* 5 MG TABLET: 5 MG PO QDAY #30      * Metoprolol Succinate 50 MG TAB.ER.24H: 37.5 MG PO HS #30      * HCTZ (hydroCHLOROthiazide) 12.5 MG CAPSULE: 12.5 MG PO QDAY #30      * Loratadine 10 MG TABLET: 10 MG PO HS #30      * Aspirin EC 81 MG TABLET.DR: 81 MG PO QDAY #30      * Rosuvastatin Calcium (Crestor*) 5 MG TAB: 5 MG PO HS #30      * Ezetimibe (Zetia) 10 MG TABLET: 10 MG PO QDAY #30      New Diagnostics      * Chest W/O Cont High Resolution, SCHEDULED PROCEDURE         Dx: Sjogren's disease - M35.00      * Basic Sleep Study, Routine         Dx: Sjogren's disease - M35.00      * CBC, Routine         Dx: Chronic anemia - D64.9      New Office Procedures      * Fluzone Vaccine High-Dose, As Soon As Possible         Flu Vacc (65Yr Up)/Pf (Fluzone High-Dose Syr) 180 MCG/0.5 ML SYRINGE: 180        MICROGRAM INTRAMUSCULARLY Qty 1 SYRINGE         Dx: Sjogren's disease - M35.00      Assessment:      1.  Worsening dyspnea with exertion      2.  Sjogren's disease      3.  Fatigue      4.  Snoring      5.  Insomnia      6.  Chronic leukopenia      7.  Chronic normocytic anemia            Plan      The patient needs a high-resolution CT scan to rule out lung involvement of     autoimmune disease/Sjogren's given her  isolated decrease in diffusion capacity      Repeat CBC to rule out worsening anemia as an explanation for her isolated     decrease in diffusion capacity      I have ordered for a basic sleep study given her concerning signs for sleep     apnea and fatigue      Offered an albuterol inhaler to help with exertional dyspnea but she kindly     declined      The patient was administered a Fluzone vaccine today      She declined getting Prevnar vaccine at this time, we will ask again at her     follow-up visit            Follow-up in our clinic after the above studies have been performed            Patient Education      Education resources provided:  Yes      Patient Education Provided:  Sleep Apnea            Electronically signed by MIGUEL FOLEY  09/22/2020 17:42       Disclaimer: Converted document may not contain table formatting or lab diagrams. Please see [employer] for the authenticated document.

## 2021-05-28 NOTE — PROGRESS NOTES
Patient: MORE GAYTAN     Acct: [id number]     Report: #[id number]  UNIT #: [id number]     : 1950    Encounter Date:2021  PRIMARY CARE: EDUARDO PRATHER  ***Signed***  --------------------------------------------------------------------------------------------------------------------  Chief Complaint      Encounter Date      2021            Primary Care Provider      EDUARDO PRATHER            Referring Provider      DARINEL ALLEN            Patient Complaint      Patient is complaining of      CPAP compliance, ROBERT            VITALS      Height 5 ft 6 in / 167.64 cm      Weight 181 lbs 0 oz / 82.449183 kg      BSA 1.92 m2      BMI 29.2 kg/m2      Temperature 98.0 F / 36.67 C - Temporal      Pulse 55      Respirations 15      Blood Pressure 135/63 Sitting, Right Arm      Pulse Oximetry 98%, room air            HPI      The patient is a 70 year old female patient 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 Allen and was seen by Dr. Esquivel back on     2020 after having extensive cardiac workup and pulmonary evaluation of her    symptoms. The patient was seen by myself back on 10/29/2020.  The patient was     started on a CPAP machine for obstructive sleep apnea.  Upon reviewing patient's    CPAP compliance report, out of 30 days the patient's average daily use was 7     hours and 33 minutes with autotitrating pressures of 5-15 cm of water with a     medium pressure of 6.1 cm of water with an apnea/hypopnea index of 3.8. The     patient states her CPAP is helping her sleep better at night. The patient denies    any morning headaches and states her excessive daytime sleepiness has improved.     The patient states that she is not able to climb up her basement stairs and she     is not short of breath anymore since starting her CPAP, although at times she     does have a difficult time wearing it as she is a side sleeper and it will slide    off her  face at times. The patient also reports that she has a history of     SjÃ¶gren's and chronic leukopenia that is currently under the care of     rheumatology.  The patient currently denies any cough, wheezing, fever, chills,     night sweats, hemoptysis, purulent sputum production, chest pain, chest     tightness, swollen glands in the head and neck, abdominal pain, nausea, vomiting    or diarrhea.   The patient denies any headaches, myalgias, sore throat, changes     in sense of taste and/or smell or any other coronavirus or flu-like symptoms.      The patient denies any leg swelling, paroxysmal nocturnal dyspnea or orthopnea.     The patient states she is under the care of Dr. Valle, cardiologist for a     history of palpitations and is scheduled to follow up with him.  The patient     states she is able to perform ADLs without difficulty.            I have personally reviewed the review of systems, past family, social, surgical     and medical histories and I agree with those as entered in the chart.      Copies To:   Sean Christiansen      Constitutional:  Denies: Fatigue, Fever, Weight gain, Weight loss, Chills,     Insomnia, Other      Respiratory/Breathing:  Complains of: Shortness of air; Denies: Wheezing, Cough,    Hemoptysis, Pleuritic pain, Other      Endocrine:  Denies: Polydipsia, Polyuria, Heat/cold intolerance, Abnorml     menstrual pattern, Diabetes, Other      Eyes:  Denies: Blurred vision, Vision Changes, Other      Ears, nose, mouth, throat:  Denies: Mouth lesions, Thrush, Throat pain,     Hoarseness, Allergies/Hay Fever, Post Nasal Drip, Headaches, Recent Head Injury,    Nose Bleeding, Neck Stiffness, Thyroid Mass, Hearing Loss, Ear Fullness, Dry     Mouth, Nasal or Sinus Pain, Dry Lips, Nasal discharge, Nasal congestion, Other      Cardiovascular:  Denies: Palpitations, Syncope, Claudication, Chest Pain, Wake     up Gasping for air, Leg Swelling, Irregular Heart Rate, Cyanosis,  Dyspnea on     Exertion, Other      Gastrointestinal:  Denies: Nausea, Constipation, Diarrhea, Abdominal pain,     Vomiting, Difficulty Swallowing, Reflux/Heartburn, Dysphagia, Jaundice, Blo    ating, Melena, Bloody stools, Other      Genitourinary:  Denies: Urinary frequency, Incontinence, Hematuria, Urgency,     Nocturia, Dysuria, Testicular problems, Other      Musculoskeletal:  Denies: Joint Pain, Joint Stiffness, Joint Swelling, Myalgias,    Other      Hematologic/lymphatic:  DENIES: Lymphadenopathy, Bruising, Bleeding tendencies,     Other      Neurological:  Denies: Headache, Numbness, Weakness, Seizures, Other      Psychiatric:  Denies: Anxiety, Appropriate Effect, Depression, Other      Sleep:  No: Excessive daytime sleep, Morning Headache?, Snoring, Insomnia?, Stop    breathing at sleep?, Other      Integumentary:  Denies: Rash, Dry skin, Skin Warm to Touch, Other      Immunologic/Allergic:  Denies: Latex allergy, Seasonal allergies, Asthma,     Urticaria, Eczema, Other      Immunization status:  No: Up to date            FAMILY/SOCIAL/MEDICAL HX      Surgical History:  Yes: Abdominal Surgery (URETHAL SLING )      Stroke - Family Hx:  Grandparent      Heart - Family Hx:  Grandparent      Cancer/Type - Family Hx:  Aunt, Uncle      Is Father Still Living?:  No      Is Mother Still Living?:  No      Social History:  No Tobacco Use, No Alcohol Use, No Recreational Drug use      Smoking status:  Never smoker      Anticoagulation Therapy:  No      Antibiotic Prophylaxis:  No      Medical History:  Yes: Allergies, High Blood Pressure, High Cholesterol,     Miscellaneous Medical/oth (auto immune, mitralvalve prolapse); No: Blood     Disease, Chemotherapy/Cancer, Deafness or Ringing Ears, Diabetes, Shortness Of     Breath, Sinus Trouble      Psychiatric History      none            PREVENTION      Hx Influenza Vaccination:  Yes      Date Influenza Vaccine Given:  Sep 1, 2020      Influenza Vaccine Declined:  No       2 or More Falls in Past Year?:  No      Fall Past Year with Injury?:  No      Hx Pneumococcal Vaccination:  Yes      Encouraged to follow-up with:  PCP regarding preventative exams.      Chart initiated by      Licha Headley CMA            ALLERGIES/MEDICATIONS      Allergies:        Coded Allergies:             SULFAMETHOXAZOLE (Verified  Allergy, Intermediate, Rash, 1/13/21)           TRIMETHOPRIM (Verified  Allergy, Intermediate, Rash, 1/13/21)      Medications    Last Reconciled on 1/13/21 16:22 by ANAYA MORALES,       Multivitamins (Multi-Vitamin) 1 Each Tablet      1 TAB PO QDAY, #30 TAB 0 Refills         Reported         1/13/21       Ezetimibe (Zetia) 10 Mg Tablet      10 MG PO QDAY, #30 TAB 0 Refills         Reported         9/22/20       Rosuvastatin Calcium (Crestor*) 5 Mg Tab      5 MG PO HS, #30 TAB 0 Refills         Reported         9/22/20       Aspirin EC (Aspirin EC) 81 Mg Tablet.dr      81 MG PO QDAY, #30 TAB.EC 0 Refills         Reported         9/22/20       Loratadine (Loratadine) 10 Mg Tablet      10 MG PO HS, #30 TAB 0 Refills         Reported         9/22/20       Hctz (hydroCHLOROthiazide) 12.5 Mg Capsule      12.5 MG PO QDAY, #30 CAP 0 Refills         Reported         9/22/20       Metoprolol Succinate (Metoprolol Succinate) 50 Mg Tab.er.24h      37.5 MG PO HS, #30 TAB 0 Refills         Reported         9/22/20       Lisinopril* (Lisinopril*) 5 Mg Tablet      5 MG PO QDAY, #30 TAB 0 Refills         Reported         9/22/20       Acetaminophen (Tylenol) 325 Mg Tablet      650 MG PO Q4H for [id number], TAB         Reported         8/4/15       Ubidecarenone (Co Q-10) 200 Mg Cap      200 MG PO HS, CAP         Reported         8/4/15      Current Medications      Current Medications Reviewed 1/13/21            EXAM      Vital Signs Reviewed.      General:  WDWN, Alert, NAD.      HEENT: PERRL, EOMI.  OP, nares clear, no sinus tenderness.      Neck: Supple, no JVD, no thyromegaly.       Lymph: No axillary, cervical, supraclavicular lymphadenopathy noted bilaterally.      Chest: Lungs clear to auscultation bilaterally, no wheezes, rales or rhonchi,     normal work of breathing noted, patient able to speak full sentences without     difficulty.       CV: RRR, no MGR, pulses 2+, equal.        Abd: Soft, NT, ND, +BS, no HSM.      EXT: No clubbing, no cyanosis, no edema, no joint tenderness.        Neuro:  A  Skin: No rashes or lesions.      Vtials      Vitals:             Height 5 ft 6 in / 167.64 cm           Weight 181 lbs 0 oz / 82.441266 kg           BSA 1.92 m2           BMI 29.2 kg/m2           Temperature 98.0 F / 36.67 C - Temporal           Pulse 55           Respirations 15           Blood Pressure 135/63 Sitting, Right Arm           Pulse Oximetry 98%, room air            REVIEW      Results Reviewed      PCCS Results Reviewed?:  Yes Prev Lab Results, Yes Prev Radiology Results, Yes     Previous Mecial Records      Lab Results      I reviewed my last office visit note.  I reviewed patient's CPAP compliance     report, apnea/hypopnea index of 3.8.            Assessment      Notes      New Medications      * MULTIVITAMINS (Multi-Vitamin) 1 EACH TABLET: 1 TAB PO QDAY #30      ASSESSMENT:       1.  Dyspnea on exertion, improved since starting CPAP.      2. Obstructive sleep apnea, patient on nightly CPAP.      3.  SjÃ¶gren's disease.      4.  Fatigue, improved since starting CPAP.      5. Snoring.      6. Insomnia.      7. Chronic leukopenia.      8.  Chronic normocytic anemia. The patient reports she had a colonoscopy and she    has seen a urologist for microscopic hematuria.      9. Coronary artery calcifications, patient under the care of Dr. Valle.      10. Pulmonary nodules, 6 mm in right lower lobe.  The patient is scheduled for     repeat chest CT in 04/2021.      11.  Never smoker.            PLAN:      1.  The patient is advised to have repeat chest CT scan as scheduled in April.       2. The patient to continue CPAP at current settings and clean mask and tubing     daily.        3. The patient is advised to follow up with Dr. Valle, cardiologist as     scheduled.      4.  Patient is advised to call the office, 911 or go to the ER with any new or     worsening symptoms.      5.  The patient reports she is up-to-date with her flu and pneumonia vaccines.      The patient is advised to receive the COVID19 vaccine when it becomes available     and to follow CDC recommendations such as social distancing, wearing a mask and     washing hands for at least 20 seconds.      6. Follow up with Dr. Christiansen in April after CT scan is completed, sooner if     needed.            Patient Education      Patient Education Provided:  Sleep Apnea      Time Spent:  > 50% /Coord Care            Electronically signed by ANAYA MORALES [facility]  01/19/2021 13:10       Disclaimer: Converted document may not contain table formatting or lab diagrams. Please see [employer] for the authenticated document.

## 2021-06-01 ENCOUNTER — TRANSCRIBE ORDERS (OUTPATIENT)
Dept: CARDIOLOGY | Facility: CLINIC | Age: 71
End: 2021-06-01

## 2021-06-01 DIAGNOSIS — R07.2 PRECORDIAL CHEST PAIN: Primary | ICD-10-CM

## 2021-06-05 NOTE — PROGRESS NOTES
Progress Note      Patient Name: Anabell Richards   Patient ID: [id number]   Sex: Female   YOB: 1950    Primary Care Provider: Priscilla GORDILLO   Referring Provider: Priscilla GORDILLO    Visit Date: May 10, 2021    Provider: Jenny Valle MD   Location: [facility]   Location Address: [address] [place] [address], [address]   [place]  [id number]   Location Phone: [phone]          Chief Complaint  · Shortness of breath       History Of Present Illness  REFERRING PROVIDER: Maggie GORDILLO   Anabell Richards is a 71-year-old white female who continues to complain of increasing shortness of breath. She feels like it is because she is doing more activities here, so she is noticing the shortness of breath more. She also complains of fatigue and tiring easier. She feels like heart races whenever she exerts herself. She rests for a few minutes, the heart rate goes down, then she goes about doing activities again. She does have occasional swelling if on her feet long term, but denies any chest pain, dizziness, syncope, PND, or orthopnea. She is recommended by her pulmonologist to have a right heart cath done to evaluate for pulmonary hypertension.   PAST MEDICAL HISTORY: Hyperlipidemia; Hypertension; Mitral valve regurgitation.   PSYCHOSOCIAL HISTORY: Denies alcohol or tobacco use.   CURRENT MEDICATIONS: Lisinopril 5 mg daily; Rosuvastatin 5 mg five days a week; Hydrochlorothiazide 12.5 mg daily; Metoprolol ER 50 mg daily; Ezetimibe 10 mg daily; multivitamin daily; CoQ10 daily.      ALLERGIES: Flu vaccine.       Review of Systems  · Cardiovascular  o Admits  o : swelling (feet, ankles, hands), shortness of breath while walking or lying flat  o Denies  o : palpitations (fast, fluttering, or skipping beats), chest pain or angina pectoris   · Respiratory  o Denies  o : chronic or frequent cough      Vitals  Date Time BP Position Site L\R Cuff Size HR RR TEMP (F) WT  HT  BMI kg/m2 BSA m2 O2  "Sat FR L/min FiO2 HC       05/10/2021 01:27 /56 Sitting    66 - R   181lbs 0oz 5'  6\" 29.21 1.96             Physical Examination  · Constitutional  o Appearance  o : Awake, alert, in no acute distress.  · Eyes  o Conjunctivae  o : Conjunctivae normal.  · Ears, Nose, Mouth and Throat  o Oral Cavity  o :   § Oral Mucosa  § : Normal.  · Neck  o Jugular Veins  o : No JVD. Good carotid upstroke. No bruits noted.  · Respiratory  o Respiratory  o : Good respiratory effort. Clear to percussion and auscultation.  · Cardiovascular  o Heart  o : PMI is normal. S1, S2 normal. No S3. No S4. Negative systolic/diastolic murmur.  o Peripheral Vascular System  o :   § Extremities  § : Good femoral and pedal pulses. No pedal edema.  · Gastrointestinal  o Abdominal Examination  o : Soft. No tenderness or masses felt. No hepatosplenomegaly. Abdominal aorta is not palpable.  · Labs  o Labs  o : On recent labs, hemoglobin 12.9, hematocrit 39.1, BUN 26, creatinine 1.07. In February her total cholesterol 164, triglyceride 123, LDL 87, HDL 52.          Assessment     1.  Shortness of breath.  2.  Hypertension, controlled.   3.  Hyperlipidemia.  4.  Mitral valve regurgitation.          Plan     1.  Will do right and left CAD and pulmonary hypertension.   2.  Continue Lisinopril and Hydrochlorothiazide, and Metoprolol for hypertension.   3.  Continue Rosuvastatin and Ezetimibe for hyperlipidemia.  4.  The rest of the treatment will be based on results of the catheterization.      RAND Deleon/Jneny Valle MD, [facility]  JF/PMM/pap                 Electronically Signed by: Jenny Valle MD -Author on May 13, 2021 10:59:28 AM  "

## 2021-06-15 ENCOUNTER — OFFICE VISIT (OUTPATIENT)
Dept: PULMONOLOGY | Facility: CLINIC | Age: 71
End: 2021-06-15

## 2021-06-15 VITALS
OXYGEN SATURATION: 99 % | RESPIRATION RATE: 17 BRPM | WEIGHT: 177 LBS | HEIGHT: 66 IN | DIASTOLIC BLOOD PRESSURE: 67 MMHG | SYSTOLIC BLOOD PRESSURE: 122 MMHG | HEART RATE: 70 BPM | TEMPERATURE: 97.4 F | BODY MASS INDEX: 28.45 KG/M2

## 2021-06-15 DIAGNOSIS — R06.09 DOE (DYSPNEA ON EXERTION): Primary | ICD-10-CM

## 2021-06-15 DIAGNOSIS — R91.1 PULMONARY NODULE: ICD-10-CM

## 2021-06-15 DIAGNOSIS — D63.8 ANEMIA, CHRONIC DISEASE: ICD-10-CM

## 2021-06-15 DIAGNOSIS — M35.00 SJOGREN'S SYNDROME, WITH UNSPECIFIED ORGAN INVOLVEMENT (HCC): ICD-10-CM

## 2021-06-15 PROCEDURE — 99214 OFFICE O/P EST MOD 30 MIN: CPT | Performed by: INTERNAL MEDICINE

## 2021-06-15 RX ORDER — ASPIRIN 81 MG/1
81 TABLET, CHEWABLE ORAL DAILY
COMMUNITY
End: 2021-11-10 | Stop reason: ALTCHOICE

## 2021-06-15 RX ORDER — EZETIMIBE 10 MG/1
10 TABLET ORAL DAILY
COMMUNITY
Start: 2021-04-19 | End: 2021-11-17 | Stop reason: ALTCHOICE

## 2021-06-15 RX ORDER — UBIDECARENONE 100 MG
200 CAPSULE ORAL DAILY
COMMUNITY
End: 2022-07-14 | Stop reason: HOSPADM

## 2021-06-15 RX ORDER — LISINOPRIL 5 MG/1
5 TABLET ORAL DAILY
COMMUNITY
Start: 2021-04-04 | End: 2022-06-02

## 2021-06-15 RX ORDER — ROSUVASTATIN CALCIUM 5 MG/1
TABLET, COATED ORAL
COMMUNITY
Start: 2021-05-19 | End: 2021-11-10 | Stop reason: SINTOL

## 2021-06-15 RX ORDER — METOPROLOL SUCCINATE 50 MG/1
50 TABLET, EXTENDED RELEASE ORAL DAILY
COMMUNITY
Start: 2021-04-07 | End: 2021-11-17 | Stop reason: ALTCHOICE

## 2021-06-15 RX ORDER — HYDROCHLOROTHIAZIDE 12.5 MG/1
12.5 TABLET ORAL EVERY OTHER DAY
COMMUNITY
Start: 2021-04-04 | End: 2022-06-27

## 2021-06-15 NOTE — PROGRESS NOTES
"Chief Complaint  Sleep Apnea and Shortness of Breath    Subjective          Anabell Richards presents to [facility]  History of Present Illness  Pleasant 71-year-old female follow-up shortness of breath.  PFT since last office visit showed no obstruction no restriction with slight reduction in DLCO, patient does have evidence of wildly reduced DLCO which is likely secondary to mild anemia.  She had right heart catheterization since last office visit that showed no evidence whatsoever of any pulmonary artery hypertension.  She is still dyspneic but has no progression her symptoms since last office visit.  She does complain of daily shortness of breath when she is out walking with her  he has Parkinson's disease and generalized poor health and activities that they do together she is the one that developed shortness of breath such as walking up a incline.  She has had an extensive cardio and pulmonary evaluation thus far which has failed to yield any significant pathology that could explain her dyspnea.    Objective   Vital Signs:   /67 (BP Location: Left arm, Patient Position: Sitting)   Pulse 70   Temp 97.4 °F (36.3 °C) (Temporal)   Resp 17   Ht 167.6 cm (66\")   Wt 80.3 kg (177 lb)   SpO2 99%   BMI 28.57 kg/m²     Physical Exam   Vital Signs Reviewed  WDWN, Alert, NAD.    HEENT:  PERRL, EOMI.  OP, nares clear, no sinus tenderness  Neck:  Supple, no JVD, no thyromegaly  Lymph: no axillary, cervical, supraclavicular lymphadenopathy noted bilaterally  Chest:  good aeration, clear to auscultation bilaterally, tympanic to percussion bilaterally, no work of breathing noted  CV: RRR, no MGR, pulses 2+, equal.  Abd:  Soft, NT, ND, + BS, no HSM  EXT:  no clubbing, no cyanosis, no edema, no joint tenderness  Neuro:  A&Ox3, CN grossly intact, no focal deficits.  Skin: No rashes or lesions noted        Result Review :     Common labs    Common Labsle " 2/25/21 2/25/21 4/29/21 4/29/21 5/25/21 5/25/21 5/25/21 5/25/21 5/25/21 5/25/21    1234 1234 1232 1234 0708 0708 0708 0847 0852 0855   Glucose  89 75   96       BUN  24 26 (A)   25       Creatinine  0.88 1.07 (A)   1.09 (A)       Sodium  135 139   138       Potassium  3.9 4.4   3.7       Chloride  99 101   102       Calcium  9.2 9.7   9.2       Albumin  4.4 4.6   4.4       Total Bilirubin  0.36 0.23   0.35       Alkaline Phosphatase  53 46   45       AST (SGOT)  25 25   25       ALT (SGPT)  25 28   28       WBC    3.27 (A) 3.27 (A)        Hemoglobin    12.90 11.9 (A)   11.4 (A) 11.6 (A) 11.5 (A)   Hematocrit    39.1 35.2 (A)        Platelets    163.00 169        Total Cholesterol 164      158      Triglycerides 123      76      HDL Cholesterol 52      50      LDL Cholesterol  87      93      (A) Abnormal value       Comments are available for some flowsheets but are not being displayed.           Data reviewed: Lab studies that show mild anemia, PFTs that showed mild reduction in DLCO, right heart catheterization showing no evidence of pulmonary hypertension and no evidence of increased filling pressures          Assessment and Plan    Diagnoses and all orders for this visit:    1. LAKE (dyspnea on exertion) (Primary)  Assessment & Plan:  Reviewed right heart catheterization no evidence of any pulmonary hypertension mean pulmonary artery pressure 12  Still uncertain of the etiology of the patient's shortness of breath  Patient is set for MRI of the heart per cardiology service  I have offered patient to have a cardiopulmonary exercise stress test to help us determine the cause of her shortness of breath as there is not appear to be any cause identified thus far patient wishes to hold off at this time      2. Sjogren's syndrome, with unspecified organ involvement (CMS/HCC)  Assessment & Plan:  Recent CT scan from April 2021 shows no evidence of any interstitial lung disease no evidence of pulmonary involvement at this  time      3. Anemia, chronic disease  Assessment & Plan:  Slight anemia which likely explains the patient's mildly decreased DLCO but not a significant anemia to explain shortness of breath that she is experiencing      4. Pulmonary nodule  Assessment & Plan:  We will plan on repeating CT scan of the chest in April 2022    Orders:  -     CT Chest Without Contrast Diagnostic; Future      Follow Up   Return in about 10 months (around 4/15/2022).  Patient was given instructions and counseling regarding her condition or for health maintenance advice. Please see specific information pulled into the AVS if appropriate.

## 2021-06-15 NOTE — ASSESSMENT & PLAN NOTE
Recent CT scan from April 2021 shows no evidence of any interstitial lung disease no evidence of pulmonary involvement at this time

## 2021-06-15 NOTE — ASSESSMENT & PLAN NOTE
Slight anemia which likely explains the patient's mildly decreased DLCO but not a significant anemia to explain shortness of breath that she is experiencing

## 2021-06-15 NOTE — ASSESSMENT & PLAN NOTE
Reviewed right heart catheterization no evidence of any pulmonary hypertension mean pulmonary artery pressure 12  Still uncertain of the etiology of the patient's shortness of breath  Patient is set for MRI of the heart per cardiology service  I have offered patient to have a cardiopulmonary exercise stress test to help us determine the cause of her shortness of breath as there is not appear to be any cause identified thus far patient wishes to hold off at this time

## 2021-06-23 ENCOUNTER — HOSPITAL ENCOUNTER (OUTPATIENT)
Dept: MRI IMAGING | Facility: HOSPITAL | Age: 71
Discharge: HOME | End: 2021-06-23
Admitting: INTERNAL MEDICINE

## 2021-06-23 PROCEDURE — 75561 CARDIAC MRI FOR MORPH W/DYE: CPT | Performed by: INTERNAL MEDICINE

## 2021-06-23 PROCEDURE — 0 GADOBENATE DIMEGLUMINE 529 MG/ML SOLUTION: Performed by: INTERNAL MEDICINE

## 2021-06-23 PROCEDURE — 75561 CARDIAC MRI FOR MORPH W/DYE: CPT

## 2021-06-23 PROCEDURE — 82565 ASSAY OF CREATININE: CPT

## 2021-06-23 PROCEDURE — A9577 INJ MULTIHANCE: HCPCS | Performed by: INTERNAL MEDICINE

## 2021-06-23 RX ADMIN — GADOBENATE DIMEGLUMINE 16 ML: 529 INJECTION, SOLUTION INTRAVENOUS at 10:53

## 2021-06-24 LAB — CREAT BLDA-MCNC: 0.8 MG/DL (ref 0.6–1.3)

## 2021-06-28 ENCOUNTER — TELEPHONE (OUTPATIENT)
Dept: CARDIOLOGY | Facility: CLINIC | Age: 71
End: 2021-06-28

## 2021-06-28 NOTE — TELEPHONE ENCOUNTER
S/W patient regarding results of cardiac MRI. Advised that it didn't show any cardiac findings to explain SOB. Advised to keep f/u with Dr. Valle and to f/u with Dr. zhou for any more issues related to SOB.

## 2021-06-30 ENCOUNTER — LAB (OUTPATIENT)
Dept: LAB | Facility: HOSPITAL | Age: 71
End: 2021-06-30

## 2021-06-30 ENCOUNTER — TRANSCRIBE ORDERS (OUTPATIENT)
Dept: ADMINISTRATIVE | Facility: HOSPITAL | Age: 71
End: 2021-06-30

## 2021-06-30 DIAGNOSIS — I25.10 DISEASE OF CARDIOVASCULAR SYSTEM: ICD-10-CM

## 2021-06-30 DIAGNOSIS — I25.10 DISEASE OF CARDIOVASCULAR SYSTEM: Primary | ICD-10-CM

## 2021-06-30 DIAGNOSIS — E78.5 HYPERLIPIDEMIA, UNSPECIFIED HYPERLIPIDEMIA TYPE: ICD-10-CM

## 2021-06-30 LAB
ALBUMIN SERPL-MCNC: 4.5 G/DL (ref 3.5–5.2)
ALBUMIN/GLOB SERPL: 1.4 G/DL
ALP SERPL-CCNC: 46 U/L (ref 39–117)
ALT SERPL W P-5'-P-CCNC: 32 U/L (ref 1–33)
ANION GAP SERPL CALCULATED.3IONS-SCNC: 12.3 MMOL/L (ref 5–15)
AST SERPL-CCNC: 32 U/L (ref 1–32)
BILIRUB SERPL-MCNC: 0.4 MG/DL (ref 0–1.2)
BUN SERPL-MCNC: 20 MG/DL (ref 8–23)
BUN/CREAT SERPL: 20 (ref 7–25)
CALCIUM SPEC-SCNC: 9.3 MG/DL (ref 8.6–10.5)
CHLORIDE SERPL-SCNC: 101 MMOL/L (ref 98–107)
CHOLEST SERPL-MCNC: 142 MG/DL (ref 0–200)
CO2 SERPL-SCNC: 24.7 MMOL/L (ref 22–29)
CREAT SERPL-MCNC: 1 MG/DL (ref 0.57–1)
GFR SERPL CREATININE-BSD FRML MDRD: 55 ML/MIN/1.73
GLOBULIN UR ELPH-MCNC: 3.2 GM/DL
GLUCOSE SERPL-MCNC: 84 MG/DL (ref 65–99)
HDLC SERPL-MCNC: 43 MG/DL (ref 40–60)
LDLC SERPL CALC-MCNC: 78 MG/DL (ref 0–100)
LDLC/HDLC SERPL: 1.77 {RATIO}
POTASSIUM SERPL-SCNC: 4 MMOL/L (ref 3.5–5.2)
PROT SERPL-MCNC: 7.7 G/DL (ref 6–8.5)
SODIUM SERPL-SCNC: 138 MMOL/L (ref 136–145)
TRIGL SERPL-MCNC: 114 MG/DL (ref 0–150)
VLDLC SERPL-MCNC: 21 MG/DL (ref 5–40)

## 2021-06-30 PROCEDURE — 80053 COMPREHEN METABOLIC PANEL: CPT

## 2021-06-30 PROCEDURE — 36415 COLL VENOUS BLD VENIPUNCTURE: CPT

## 2021-06-30 PROCEDURE — 80061 LIPID PANEL: CPT

## 2021-07-02 VITALS
SYSTOLIC BLOOD PRESSURE: 120 MMHG | BODY MASS INDEX: 27.31 KG/M2 | WEIGHT: 174 LBS | TEMPERATURE: 97.7 F | HEART RATE: 77 BPM | HEIGHT: 67 IN | DIASTOLIC BLOOD PRESSURE: 68 MMHG

## 2021-07-15 VITALS
WEIGHT: 181 LBS | HEART RATE: 66 BPM | DIASTOLIC BLOOD PRESSURE: 56 MMHG | BODY MASS INDEX: 29.09 KG/M2 | HEIGHT: 66 IN | SYSTOLIC BLOOD PRESSURE: 128 MMHG

## 2021-07-29 ENCOUNTER — TELEPHONE (OUTPATIENT)
Dept: CARDIOLOGY | Facility: CLINIC | Age: 71
End: 2021-07-29

## 2021-07-29 NOTE — TELEPHONE ENCOUNTER
Have her stay off the Crestor.  She can start Lipitor back at 10 mg/day and Zetia 10 mg a day.  She was intolerant to 20 mg of Lipitor and was not under control with 10 mg alone

## 2021-07-29 NOTE — TELEPHONE ENCOUNTER
Received VM from patient stating that she recently stopped her rosuvastatin and that her SOB has improved.     Patient asked if there was another statin she could try.

## 2021-07-30 RX ORDER — ATORVASTATIN CALCIUM 10 MG/1
10 TABLET, FILM COATED ORAL DAILY
Qty: 90 TABLET | Refills: 3 | Status: SHIPPED | OUTPATIENT
Start: 2021-07-30 | End: 2021-11-17 | Stop reason: SINTOL

## 2021-11-05 ENCOUNTER — TRANSCRIBE ORDERS (OUTPATIENT)
Dept: ADMINISTRATIVE | Facility: HOSPITAL | Age: 71
End: 2021-11-05

## 2021-11-05 ENCOUNTER — LAB (OUTPATIENT)
Dept: LAB | Facility: HOSPITAL | Age: 71
End: 2021-11-05

## 2021-11-05 DIAGNOSIS — E78.5 HYPERLIPIDEMIA, UNSPECIFIED HYPERLIPIDEMIA TYPE: ICD-10-CM

## 2021-11-05 DIAGNOSIS — Z79.899 ENCOUNTER FOR LONG-TERM (CURRENT) USE OF OTHER MEDICATIONS: ICD-10-CM

## 2021-11-05 DIAGNOSIS — E78.5 HYPERLIPIDEMIA, UNSPECIFIED HYPERLIPIDEMIA TYPE: Primary | ICD-10-CM

## 2021-11-05 DIAGNOSIS — E03.9 HYPOTHYROIDISM, UNSPECIFIED TYPE: ICD-10-CM

## 2021-11-05 DIAGNOSIS — I10 HYPERTENSION, ESSENTIAL: Primary | ICD-10-CM

## 2021-11-05 DIAGNOSIS — I10 HYPERTENSION, ESSENTIAL: ICD-10-CM

## 2021-11-05 LAB
ALBUMIN SERPL-MCNC: 4.2 G/DL (ref 3.5–5.2)
ALBUMIN/GLOB SERPL: 1.2 G/DL
ALP SERPL-CCNC: 47 U/L (ref 39–117)
ALT SERPL W P-5'-P-CCNC: 34 U/L (ref 1–33)
ANION GAP SERPL CALCULATED.3IONS-SCNC: 4.7 MMOL/L (ref 5–15)
AST SERPL-CCNC: 29 U/L (ref 1–32)
BASOPHILS # BLD AUTO: 0.01 10*3/MM3 (ref 0–0.2)
BASOPHILS NFR BLD AUTO: 0.3 % (ref 0–1.5)
BILIRUB SERPL-MCNC: 0.3 MG/DL (ref 0–1.2)
BUN SERPL-MCNC: 21 MG/DL (ref 8–23)
BUN/CREAT SERPL: 25 (ref 7–25)
CALCIUM SPEC-SCNC: 9.4 MG/DL (ref 8.6–10.5)
CHLORIDE SERPL-SCNC: 99 MMOL/L (ref 98–107)
CHOLEST SERPL-MCNC: 229 MG/DL (ref 0–200)
CO2 SERPL-SCNC: 26.3 MMOL/L (ref 22–29)
CREAT SERPL-MCNC: 0.84 MG/DL (ref 0.57–1)
DEPRECATED RDW RBC AUTO: 44.5 FL (ref 37–54)
EOSINOPHIL # BLD AUTO: 0.1 10*3/MM3 (ref 0–0.4)
EOSINOPHIL NFR BLD AUTO: 3.3 % (ref 0.3–6.2)
ERYTHROCYTE [DISTWIDTH] IN BLOOD BY AUTOMATED COUNT: 13.3 % (ref 12.3–15.4)
GFR SERPL CREATININE-BSD FRML MDRD: 67 ML/MIN/1.73
GLOBULIN UR ELPH-MCNC: 3.4 GM/DL
GLUCOSE SERPL-MCNC: 92 MG/DL (ref 65–99)
HCT VFR BLD AUTO: 36 % (ref 34–46.6)
HDLC SERPL-MCNC: 41 MG/DL (ref 40–60)
HGB BLD-MCNC: 12 G/DL (ref 12–15.9)
IMM GRANULOCYTES # BLD AUTO: 0.02 10*3/MM3 (ref 0–0.05)
IMM GRANULOCYTES NFR BLD AUTO: 0.7 % (ref 0–0.5)
LDLC SERPL CALC-MCNC: 159 MG/DL (ref 0–100)
LDLC/HDLC SERPL: 3.8 {RATIO}
LYMPHOCYTES # BLD AUTO: 0.94 10*3/MM3 (ref 0.7–3.1)
LYMPHOCYTES NFR BLD AUTO: 30.8 % (ref 19.6–45.3)
MCH RBC QN AUTO: 30 PG (ref 26.6–33)
MCHC RBC AUTO-ENTMCNC: 33.3 G/DL (ref 31.5–35.7)
MCV RBC AUTO: 90 FL (ref 79–97)
MONOCYTES # BLD AUTO: 0.33 10*3/MM3 (ref 0.1–0.9)
MONOCYTES NFR BLD AUTO: 10.8 % (ref 5–12)
NEUTROPHILS NFR BLD AUTO: 1.65 10*3/MM3 (ref 1.7–7)
NEUTROPHILS NFR BLD AUTO: 54.1 % (ref 42.7–76)
PLATELET # BLD AUTO: 166 10*3/MM3 (ref 140–450)
PMV BLD AUTO: 9.9 FL (ref 6–12)
POTASSIUM SERPL-SCNC: 4.1 MMOL/L (ref 3.5–5.2)
PROT SERPL-MCNC: 7.6 G/DL (ref 6–8.5)
RBC # BLD AUTO: 4 10*6/MM3 (ref 3.77–5.28)
SODIUM SERPL-SCNC: 130 MMOL/L (ref 136–145)
T-UPTAKE NFR SERPL: 1.01 TBI (ref 0.8–1.3)
T4 SERPL-MCNC: 5.26 MCG/DL (ref 4.5–11.7)
TRIGL SERPL-MCNC: 160 MG/DL (ref 0–150)
TSH SERPL DL<=0.05 MIU/L-ACNC: 1.69 UIU/ML (ref 0.27–4.2)
VLDLC SERPL-MCNC: 29 MG/DL (ref 5–40)
WBC # BLD AUTO: 3.05 10*3/MM3 (ref 3.4–10.8)

## 2021-11-05 PROCEDURE — 84436 ASSAY OF TOTAL THYROXINE: CPT

## 2021-11-05 PROCEDURE — 80053 COMPREHEN METABOLIC PANEL: CPT

## 2021-11-05 PROCEDURE — 84479 ASSAY OF THYROID (T3 OR T4): CPT

## 2021-11-05 PROCEDURE — 84443 ASSAY THYROID STIM HORMONE: CPT

## 2021-11-05 PROCEDURE — 85025 COMPLETE CBC W/AUTO DIFF WBC: CPT

## 2021-11-05 PROCEDURE — 36415 COLL VENOUS BLD VENIPUNCTURE: CPT

## 2021-11-05 PROCEDURE — 80061 LIPID PANEL: CPT

## 2021-11-10 ENCOUNTER — OFFICE VISIT (OUTPATIENT)
Dept: FAMILY MEDICINE CLINIC | Age: 71
End: 2021-11-10

## 2021-11-10 VITALS
TEMPERATURE: 97.8 F | WEIGHT: 177.8 LBS | HEART RATE: 66 BPM | SYSTOLIC BLOOD PRESSURE: 137 MMHG | BODY MASS INDEX: 28.57 KG/M2 | DIASTOLIC BLOOD PRESSURE: 72 MMHG | OXYGEN SATURATION: 99 % | HEIGHT: 66 IN

## 2021-11-10 DIAGNOSIS — E78.5 HYPERLIPIDEMIA, UNSPECIFIED HYPERLIPIDEMIA TYPE: ICD-10-CM

## 2021-11-10 DIAGNOSIS — I05.9 MITRAL VALVE DISEASE: ICD-10-CM

## 2021-11-10 DIAGNOSIS — D63.8 ANEMIA, CHRONIC DISEASE: ICD-10-CM

## 2021-11-10 DIAGNOSIS — I15.9 SECONDARY HYPERTENSION: ICD-10-CM

## 2021-11-10 DIAGNOSIS — M35.00 SJOGREN'S SYNDROME, WITH UNSPECIFIED ORGAN INVOLVEMENT (HCC): Primary | ICD-10-CM

## 2021-11-10 DIAGNOSIS — R91.1 PULMONARY NODULE: ICD-10-CM

## 2021-11-10 DIAGNOSIS — Z12.31 ENCOUNTER FOR SCREENING MAMMOGRAM FOR MALIGNANT NEOPLASM OF BREAST: ICD-10-CM

## 2021-11-10 DIAGNOSIS — Z78.0 POSTMENOPAUSAL: ICD-10-CM

## 2021-11-10 PROBLEM — I10 ESSENTIAL (PRIMARY) HYPERTENSION: Status: ACTIVE | Noted: 2021-11-10

## 2021-11-10 PROBLEM — N39.0 RECURRENT UTI: Status: ACTIVE | Noted: 2021-11-10

## 2021-11-10 PROCEDURE — 99214 OFFICE O/P EST MOD 30 MIN: CPT | Performed by: NURSE PRACTITIONER

## 2021-11-10 RX ORDER — ASPIRIN 81 MG/1
81 TABLET ORAL DAILY
COMMUNITY
End: 2022-07-14 | Stop reason: HOSPADM

## 2021-11-10 RX ORDER — DIAPER,BRIEF,ADULT, DISPOSABLE
1200 EACH MISCELLANEOUS DAILY
COMMUNITY
End: 2021-11-17

## 2021-11-10 RX ORDER — MULTIPLE VITAMINS W/ MINERALS TAB 9MG-400MCG
1 TAB ORAL DAILY
COMMUNITY
End: 2022-07-14 | Stop reason: HOSPADM

## 2021-11-10 NOTE — PROGRESS NOTES
Chief Complaint  Anabell Richards presents to [facility] for Establish Care, Hypertension, and Hyperlipidemia    Subjective          History of Present Illness    Kathryn has history of Sjogren's syndrome. She was diagnosed about 15 years ago. Was diagnosed by specialist but is no longer followed by them. Feels this is stable. History of hyperthyroidism in past around this same time but more recent lab testing has been normal and is not currently on any medication.   Additionally, she has history of hypertension. Current medication includes Lisinopril 5 mg daily, Toprol XL 50 mg daily, hydrochlorothiazide 12.5 mg daily.   She also has hyperlipidemia. Was previously on atorvastatin. This caused leg pain. She quit taking but restarted with CoQ10 and leg cramps improved. She starting seeing cardiologist for dyspnea about 5 years ago. Has had several tests including cath to rule out pulmonary hypertension. Saw pulmonologist as well. She has had no findings to reveal source of shortness of breath. She was told that it may be her statin so she quit taking and has noticed improvement in symptoms. Has appt with cardiologist next week to discuss. She is also taking zetia. She also has taken lecithin.   She has sleep apnea. Compliant with CPAP.   She has history of pulmonary nodule. Plan on repeating CT scan chest April 2022 with pulmonologist.    Review of Systems      Allergies   Allergen Reactions   • Sulfamethoxazole-Trimethoprim Rash      Past Medical History:   Diagnosis Date   • Allergies    • Hyperlipidemia    • Hypertension      Current Outpatient Medications   Medication Sig Dispense Refill   • aspirin 81 MG EC tablet Take 81 mg by mouth Daily.     • coenzyme Q10 100 MG capsule Take 200 mg by mouth Daily.     • ezetimibe (ZETIA) 10 MG tablet      • hydroCHLOROthiazide (HYDRODIURIL) 12.5 MG tablet      • Lecithin 1200 MG capsule Take 1,200 mg by mouth Daily.     • lisinopril  "(PRINIVIL,ZESTRIL) 5 MG tablet      • metoprolol succinate XL (TOPROL-XL) 50 MG 24 hr tablet      • multivitamin with minerals tablet tablet Take 1 tablet by mouth Daily.     • tretinoin (Retin-A) 0.025 % cream Retin-A 0.025 % topical cream apply to the affected area(s) by topical route once daily at bedtime   Active     • atorvastatin (LIPITOR) 10 MG tablet Take 1 tablet by mouth Daily. 90 tablet 3     No current facility-administered medications for this visit.     Past Surgical History:   Procedure Laterality Date   • ABDOMINAL SURGERY      URETHAL SLING      Social History     Tobacco Use   • Smoking status: Never Smoker   • Smokeless tobacco: Never Used   Vaping Use   • Vaping Use: Never used   Substance Use Topics   • Alcohol use: Not Currently   • Drug use: Never     Family History   Problem Relation Age of Onset   • Stroke Other         NOT SPECIFIED   • Heart disease Other         NOT SPECIFIED   • Cancer Other         NOT SPECIFIED   • Cancer Other         NOT SPECIFIED     Health Maintenance Due   Topic Date Due   • COLORECTAL CANCER SCREENING  Never done   • TDAP/TD VACCINES (1 - Tdap) Never done   • DXA SCAN  10/18/2020   • ZOSTER VACCINE (2 of 2) 12/02/2020   • HEPATITIS C SCREENING  Never done   • ANNUAL WELLNESS VISIT  10/07/2021      Immunization History   Administered Date(s) Administered   • COVID-19 (MODERNA) 02/23/2021, 03/23/2021, 10/27/2021   • Fluzone High Dose =>65 Years ([employer] ONLY) 09/22/2020   • Fluzone High-Dose 65+yrs 10/06/2021   • Pneumococcal Polysaccharide (PPSV23) 10/07/2020   • Shingrix 10/07/2020        Objective     Vitals:    11/10/21 1411   BP: 137/72   Pulse: 66   Temp: 97.8 °F (36.6 °C)   SpO2: 99%   Weight: 80.6 kg (177 lb 12.8 oz)   Height: 167.6 cm (66\")     Body mass index is 28.7 kg/m².     Physical Exam  Vitals reviewed.   Constitutional:       General: She is not in acute distress.     Appearance: Normal appearance. She is well-developed.   HENT:      Head: " Normocephalic and atraumatic.   Cardiovascular:      Rate and Rhythm: Normal rate and regular rhythm.   Pulmonary:      Effort: Pulmonary effort is normal.      Breath sounds: Normal breath sounds.   Neurological:      Mental Status: She is alert and oriented to person, place, and time.   Psychiatric:         Mood and Affect: Mood and affect normal.           Result Review :     The following data was reviewed by: RAND Whitten on 11/10/2021:          Thyroid Panel With TSH (11/05/2021 11:19)  Lipid Panel (11/05/2021 11:19)  Comprehensive Metabolic Panel (11/05/2021 11:19)  CBC & Differential (11/05/2021 11:19)                  Assessment and Plan      Diagnoses and all orders for this visit:    1. Sjogren's syndrome, with unspecified organ involvement (HCC) (Primary)  Comments:  Medical condition is stable.  Will recheck at next regular appointment.    2. Encounter for screening mammogram for malignant neoplasm of breast  -     Mammo Screening Digital Tomosynthesis Bilateral With CAD; Future    3. Postmenopausal  -     DEXA Bone Density Axial; Future    4. Anemia, chronic disease  Comments:  Most recent CBC normal.  We will continue to monitor    5. Pulmonary nodule  Comments:  Continue follow-up with pulmonology.  Plan to repeat CT chest April 2022    6. Mitral valve disease  Comments:  On beta-blocker with cardiology    7. Hyperlipidemia, unspecified hyperlipidemia type  Comments:  Not currently taking statin.  Some elevation to recent lipid panel.  She has appointment with cardiology scheduled next week.    8. Secondary hypertension  Comments:  On lisinopril, Toprol XL, HCTZ.      Patient due for Tdap and Shingrix vaccine.  She is going to check with insurance coverage.  Additionally, she reports UTD on colorectal cancer screening with colonoscopy.  Will request record as none are available for review today.        Follow Up     Return for Medicare Wellness.

## 2021-11-11 ENCOUNTER — TELEPHONE (OUTPATIENT)
Dept: FAMILY MEDICINE CLINIC | Age: 71
End: 2021-11-11

## 2021-11-16 PROBLEM — I05.9 MITRAL VALVE DISEASE: Chronic | Status: ACTIVE | Noted: 2021-11-10

## 2021-11-17 ENCOUNTER — OFFICE VISIT (OUTPATIENT)
Dept: CARDIOLOGY | Facility: CLINIC | Age: 71
End: 2021-11-17

## 2021-11-17 VITALS
HEART RATE: 63 BPM | WEIGHT: 180 LBS | DIASTOLIC BLOOD PRESSURE: 52 MMHG | SYSTOLIC BLOOD PRESSURE: 138 MMHG | BODY MASS INDEX: 28.93 KG/M2 | HEIGHT: 66 IN

## 2021-11-17 DIAGNOSIS — I25.10 CORONARY ARTERY DISEASE INVOLVING NATIVE HEART WITHOUT ANGINA PECTORIS, UNSPECIFIED VESSEL OR LESION TYPE: Chronic | ICD-10-CM

## 2021-11-17 DIAGNOSIS — I15.9 SECONDARY HYPERTENSION: Primary | ICD-10-CM

## 2021-11-17 DIAGNOSIS — E87.1 HYPONATREMIA: ICD-10-CM

## 2021-11-17 DIAGNOSIS — E78.5 HYPERLIPIDEMIA, UNSPECIFIED HYPERLIPIDEMIA TYPE: ICD-10-CM

## 2021-11-17 PROCEDURE — 99214 OFFICE O/P EST MOD 30 MIN: CPT | Performed by: NURSE PRACTITIONER

## 2021-11-17 PROCEDURE — 93000 ELECTROCARDIOGRAM COMPLETE: CPT | Performed by: NURSE PRACTITIONER

## 2021-11-17 RX ORDER — BEMPEDOIC ACID AND EZETIMIBE 180; 10 MG/1; MG/1
1 TABLET, FILM COATED ORAL DAILY
Qty: 90 TABLET | Refills: 3 | Status: SHIPPED | OUTPATIENT
Start: 2021-11-17 | End: 2021-12-10 | Stop reason: SDUPTHER

## 2021-11-17 RX ORDER — CARVEDILOL 6.25 MG/1
6.25 TABLET ORAL 2 TIMES DAILY
Qty: 180 TABLET | Refills: 3 | Status: SHIPPED | OUTPATIENT
Start: 2021-11-17 | End: 2022-06-27 | Stop reason: SDUPTHER

## 2021-11-17 NOTE — PROGRESS NOTES
Chief Complaint  Hypertension, Hyperlipidemia, and Mitral valve disease    Subjective            Anabell Richards presents to [facility]  Is a 71-year-old white female comes in to evaluate her atherosclerotic heart disease and hyperlipidemia.  Continues complain of shortness of breath.  She had felt felt it might be aggravated by her atorvastatin so she stopped the atorvastatin feels like shortness of breath is slightly better.  But continues to complain of short winded when she exerts herself like her exercises.  She also feels like it might be aggravated by her beta-blocker.  Says she can keep up with some of the older females. Denies chest pain,  palpitations, swelling, dizziness, syncope, PND, and orthopnea.  Has had all 3 Covid vaccines.              Past History:    Past Medical History:   Diagnosis Date   • Allergies    • Coronary artery disease involving native heart without angina pectoris 5/25/2021    No significant occlusive coronary disease.  Mild atherosclerotic narrowing seen in multiple vessels in multiple locations on a cath May 25, 2021   • Hyperlipidemia    • Hypertension    • Mitral valve disease 11/10/2021     Mild mitral valve prolapse with mild mitral valve regurgitation.  On echo August 1, 2019        Family History: family history includes Cancer in some other family members; Heart disease in an other family member; Stroke in an other family member.     Social History: reports that she has never smoked. She has never used smokeless tobacco. She reports previous alcohol use. She reports that she does not use drugs.    Allergies: Sulfamethoxazole-trimethoprim      Past Surgical History:   Procedure Laterality Date   • ABDOMINAL SURGERY      URETHAL SLING        Prior to Admission medications    Medication Sig Start Date End Date Taking? Authorizing Provider   aspirin 81 MG EC tablet Take 81 mg by mouth Daily.   Yes Provider, MD Denita   coenzyme Q10 100 MG  "capsule Take 200 mg by mouth Daily.   Yes Denita Padilla MD   ezetimibe (ZETIA) 10 MG tablet Take 10 mg by mouth Daily. 4/19/21  Yes Denita Padilla MD   hydroCHLOROthiazide (HYDRODIURIL) 12.5 MG tablet Take 12.5 mg by mouth Daily. 4/4/21  Yes Denita Padilla MD   lisinopril (PRINIVIL,ZESTRIL) 5 MG tablet Take 5 mg by mouth Daily. 4/4/21  Yes Denita Padilla MD   metoprolol succinate XL (TOPROL-XL) 50 MG 24 hr tablet Take 50 mg by mouth Daily. 4/7/21  Yes Denita Padilla MD   multivitamin with minerals tablet tablet Take 1 tablet by mouth Daily.   Yes Denita Padilla MD   tretinoin (Retin-A) 0.025 % cream Retin-A 0.025 % topical cream apply to the affected area(s) by topical route once daily at bedtime   Active   Yes Denita Padilla MD   atorvastatin (LIPITOR) 10 MG tablet Take 1 tablet by mouth Daily. 7/30/21   Jenny Valle MD   Lecithin 1200 MG capsule Take 1,200 mg by mouth Daily.    Denita Padilla MD        Review of Systems   Constitutional: Negative for fatigue.   Respiratory: Positive for shortness of breath (Improved).    Cardiovascular: Negative for chest pain, palpitations and leg swelling.   Neurological: Negative for dizziness.   All other systems reviewed and are negative.       Objective     Physical Exam  Constitutional:       General: She is not in acute distress.     Appearance: Normal appearance.   Neck:      Vascular: No carotid bruit.   Cardiovascular:      Rate and Rhythm: Normal rate and regular rhythm.      Heart sounds: Murmur (1/6 to 2/6 systolic murmur at the apex) heard.       Pulmonary:      Effort: Pulmonary effort is normal.      Breath sounds: Normal breath sounds.   Musculoskeletal:      Right lower leg: No edema.      Left lower leg: No edema.   Neurological:      Mental Status: She is alert.       /52   Pulse 63   Ht 167.6 cm (66\")   Wt 81.6 kg (180 lb)   BMI 29.05 kg/m²       Vitals:    11/17/21 1506   BP: 138/52 "   Pulse: 63       Result Review :         The following data was reviewed by: RAND Carlin on 11/17/2021:      Lab Results   Component Value Date     05/25/2021    BNP 75 01/17/2019     CMP    CMP 6/23/21 6/30/21 11/5/21   Glucose  84 92   BUN  20 21   Creatinine 0.80 1.00 0.84   eGFR Non African Am  55 (A) 67   Sodium  138 130 (A)   Potassium  4.0 4.1   Chloride  101 99   Calcium  9.3 9.4   Albumin  4.50 4.20   Total Bilirubin  0.4 0.3   Alkaline Phosphatase  46 47   AST (SGOT)  32 29   ALT (SGPT)  32 34 (A)   (A) Abnormal value       Comments are available for some flowsheets but are not being displayed.           CBC w/diff    CBC w/Diff 4/29/21 5/25/21 5/25/21 5/25/21 5/25/21 11/5/21     0708 0847 0852 0855    WBC 3.27 (A) 3.27 (A)    3.05 (A)   RBC 4.31 3.96 (A)    4.00   Hemoglobin 12.90 11.9 (A) 11.4 (A) 11.6 (A) 11.5 (A) 12.0   Hematocrit 39.1 35.2 (A)    36.0   MCV 90.7 88.9    90.0   MCH 29.9 30.1    30.0   MCHC 33.0 33.8    33.3   RDW 13.0 13.1    13.3   Platelets 163.00 169    166   Neutrophil Rel %  56.9    54.1   Immature Granulocyte Rel %      0.7 (A)   Lymphocyte Rel %  30.3    30.8   Monocyte Rel %  10.1 (A)    10.8   Eosinophil Rel %  1.8    3.3   Basophil Rel %  0.3    0.3   (A) Abnormal value             Lipid Panel    Lipid Panel 5/25/21 6/30/21 11/5/21   Total Cholesterol  142 229 (A)   Total Cholesterol 158     Triglycerides 76 114 160 (A)   HDL Cholesterol 50 43 41   VLDL Cholesterol 15 21 29   LDL Cholesterol  93 78 159 (A)   LDL/HDL Ratio  1.77 3.80   (A) Abnormal value       Comments are available for some flowsheets but are not being displayed.            Lab Results   Component Value Date    TSH 1.690 11/05/2021    TSH 3.390 05/25/2021    TSH 2.210 10/12/2020      Lab Results   Component Value Date    FREET4 1.1 05/25/2021    FREET4 1.2 10/22/2019                   ECG 12 Lead    Date/Time: 11/17/2021 3:46 PM  Performed by: Liliane De La Rosa Lisa, APRN  Authorized by: Estrella  Liliane Lisa, RAND   Comparison: compared with previous ECG from 5/10/2021  Similar to previous ECG  Rhythm: sinus rhythm  Rate: normal  BPM: 64    Clinical impression: normal ECG  Comments: Borderline right axis deviation                Assessment and Plan        Diagnoses and all orders for this visit:    1. Secondary hypertension (Primary)  Assessment & Plan:  Hypertension controlled.  Continue lisinopril 5 mg and hydrochlorothiazide 12.5 mg a day.  She feels like the metoprolol is causing some more shortness of breath so we are going to stop metoprolol and start carvedilol 6.25 twice daily.  If this continues to cause cause shortness of breath will consider Bystolic.    Orders:  -     carvedilol (COREG) 6.25 MG tablet; Take 1 tablet by mouth 2 (Two) Times a Day.  Dispense: 180 tablet; Refill: 3  -     Basic Metabolic Panel; Future    2. Hyperlipidemia, unspecified hyperlipidemia type  Assessment & Plan:  Hyperlipidemia worsening without a statin.  Has tried several statins and all have had side effects.  We are going to stop Zetia and start next Nexlizet at 180/10    Orders:  -     Bempedoic Acid-Ezetimibe (Nexlizet) 180-10 MG tablet; Take 1 tablet/day by mouth Daily.  Dispense: 90 tablet; Refill: 3  -     Lipid Panel; Future  -     Comprehensive Metabolic Panel; Future    3. Coronary artery disease involving native heart without angina pectoris, unspecified vessel or lesion type  Assessment & Plan:  Without angina.  Continue aspirin 81 mg a day.              Follow Up     Return in about 6 months (around 5/17/2022) for with Dr. Valle, EKG on next visit.    Patient was given instructions and counseling regarding her condition or for health maintenance advice. Please see specific information pulled into the AVS if appropriate.       RAND Deleon  11/17/21 15:11 EST

## 2021-11-17 NOTE — PATIENT INSTRUCTIONS
Stop metoprolol.    Start carvedilol 6.25 mg twice daily.    In 2 weeks, stop Zetia.  Start Nexlizet at 180/10 one tab a day

## 2021-11-17 NOTE — ASSESSMENT & PLAN NOTE
Hyperlipidemia worsening without a statin.  Has tried several statins and all have had side effects.  We are going to stop Zetia and start next Nexlizet at 180/10

## 2021-11-29 ENCOUNTER — TELEPHONE (OUTPATIENT)
Dept: FAMILY MEDICINE CLINIC | Age: 71
End: 2021-11-29

## 2021-11-29 DIAGNOSIS — E87.1 HYPONATREMIA: Primary | ICD-10-CM

## 2021-12-03 ENCOUNTER — LAB (OUTPATIENT)
Dept: LAB | Facility: HOSPITAL | Age: 71
End: 2021-12-03

## 2021-12-03 DIAGNOSIS — I15.9 SECONDARY HYPERTENSION: ICD-10-CM

## 2021-12-03 DIAGNOSIS — E78.5 HYPERLIPIDEMIA, UNSPECIFIED HYPERLIPIDEMIA TYPE: ICD-10-CM

## 2021-12-03 LAB
ALBUMIN SERPL-MCNC: 4.5 G/DL (ref 3.5–5.2)
ALBUMIN/GLOB SERPL: 1.3 G/DL
ALP SERPL-CCNC: 40 U/L (ref 39–117)
ALT SERPL W P-5'-P-CCNC: 23 U/L (ref 1–33)
ANION GAP SERPL CALCULATED.3IONS-SCNC: 5.9 MMOL/L (ref 5–15)
AST SERPL-CCNC: 26 U/L (ref 1–32)
BILIRUB SERPL-MCNC: 0.3 MG/DL (ref 0–1.2)
BUN SERPL-MCNC: 31 MG/DL (ref 8–23)
BUN/CREAT SERPL: 29.8 (ref 7–25)
CALCIUM SPEC-SCNC: 10.1 MG/DL (ref 8.6–10.5)
CHLORIDE SERPL-SCNC: 98 MMOL/L (ref 98–107)
CO2 SERPL-SCNC: 29.1 MMOL/L (ref 22–29)
CREAT SERPL-MCNC: 1.04 MG/DL (ref 0.57–1)
GFR SERPL CREATININE-BSD FRML MDRD: 53 ML/MIN/1.73
GLOBULIN UR ELPH-MCNC: 3.4 GM/DL
GLUCOSE SERPL-MCNC: 90 MG/DL (ref 65–99)
POTASSIUM SERPL-SCNC: 4.2 MMOL/L (ref 3.5–5.2)
PROT SERPL-MCNC: 7.9 G/DL (ref 6–8.5)
SODIUM SERPL-SCNC: 133 MMOL/L (ref 136–145)

## 2021-12-03 PROCEDURE — 36415 COLL VENOUS BLD VENIPUNCTURE: CPT

## 2021-12-03 PROCEDURE — 80053 COMPREHEN METABOLIC PANEL: CPT

## 2021-12-07 ENCOUNTER — TELEPHONE (OUTPATIENT)
Dept: CARDIOLOGY | Facility: CLINIC | Age: 71
End: 2021-12-07

## 2021-12-07 NOTE — TELEPHONE ENCOUNTER
----- Message from RAND Carlin sent at 12/7/2021  8:27 AM EST -----  Sodium level is still slightly low but better.  Decrease hydrochlorothiazide to 12.5 mg every other day.  Repeat BMP in 1 month.  Do a blood pressure log please.

## 2021-12-07 NOTE — TELEPHONE ENCOUNTER
danyell Ojeda is checking the status of her [employer] 180-10 [employer]. She will be out tomorrow or the day after.    June, is ok to give her samples until med approved?

## 2021-12-08 NOTE — TELEPHONE ENCOUNTER
I never received a prior authorization for this patient. I will give her samples and get the prior auth started.

## 2021-12-10 RX ORDER — BEMPEDOIC ACID AND EZETIMIBE 180; 10 MG/1; MG/1
180 TABLET, FILM COATED ORAL DAILY
Qty: 35 TABLET | Refills: 0 | COMMUNITY
Start: 2021-12-10 | End: 2021-12-23 | Stop reason: SDUPTHER

## 2021-12-17 ENCOUNTER — TELEPHONE (OUTPATIENT)
Dept: CARDIOLOGY | Facility: CLINIC | Age: 71
End: 2021-12-17

## 2021-12-23 RX ORDER — BEMPEDOIC ACID AND EZETIMIBE 180; 10 MG/1; MG/1
180 TABLET, FILM COATED ORAL DAILY
Qty: 21 TABLET | Refills: 0 | COMMUNITY
Start: 2021-12-23 | End: 2022-06-27 | Stop reason: SDUPTHER

## 2022-01-03 ENCOUNTER — APPOINTMENT (OUTPATIENT)
Dept: MAMMOGRAPHY | Facility: HOSPITAL | Age: 72
End: 2022-01-03

## 2022-01-03 ENCOUNTER — APPOINTMENT (OUTPATIENT)
Dept: BONE DENSITY | Facility: HOSPITAL | Age: 72
End: 2022-01-03

## 2022-01-05 ENCOUNTER — LAB (OUTPATIENT)
Dept: LAB | Facility: HOSPITAL | Age: 72
End: 2022-01-05

## 2022-01-05 DIAGNOSIS — E87.1 HYPONATREMIA: ICD-10-CM

## 2022-01-05 LAB
ANION GAP SERPL CALCULATED.3IONS-SCNC: 11.3 MMOL/L (ref 5–15)
BUN SERPL-MCNC: 23 MG/DL (ref 8–23)
BUN/CREAT SERPL: 24 (ref 7–25)
CALCIUM SPEC-SCNC: 9.7 MG/DL (ref 8.6–10.5)
CHLORIDE SERPL-SCNC: 103 MMOL/L (ref 98–107)
CO2 SERPL-SCNC: 25.7 MMOL/L (ref 22–29)
CREAT SERPL-MCNC: 0.96 MG/DL (ref 0.57–1)
GFR SERPL CREATININE-BSD FRML MDRD: 58 ML/MIN/1.73
GLUCOSE SERPL-MCNC: 96 MG/DL (ref 65–99)
POTASSIUM SERPL-SCNC: 4.3 MMOL/L (ref 3.5–5.2)
SODIUM SERPL-SCNC: 140 MMOL/L (ref 136–145)

## 2022-01-05 PROCEDURE — 80048 BASIC METABOLIC PNL TOTAL CA: CPT

## 2022-01-05 PROCEDURE — 36415 COLL VENOUS BLD VENIPUNCTURE: CPT

## 2022-01-11 ENCOUNTER — HOSPITAL ENCOUNTER (OUTPATIENT)
Dept: BONE DENSITY | Facility: HOSPITAL | Age: 72
Discharge: HOME | End: 2022-01-11

## 2022-01-11 ENCOUNTER — HOSPITAL ENCOUNTER (OUTPATIENT)
Dept: MAMMOGRAPHY | Facility: HOSPITAL | Age: 72
Discharge: HOME | End: 2022-01-11

## 2022-01-11 DIAGNOSIS — Z12.31 ENCOUNTER FOR SCREENING MAMMOGRAM FOR MALIGNANT NEOPLASM OF BREAST: ICD-10-CM

## 2022-01-11 DIAGNOSIS — Z78.0 POSTMENOPAUSAL: ICD-10-CM

## 2022-01-11 PROCEDURE — 77080 DXA BONE DENSITY AXIAL: CPT

## 2022-01-11 PROCEDURE — 77063 BREAST TOMOSYNTHESIS BI: CPT

## 2022-01-11 PROCEDURE — 77067 SCR MAMMO BI INCL CAD: CPT

## 2022-02-14 ENCOUNTER — LAB (OUTPATIENT)
Dept: LAB | Facility: HOSPITAL | Age: 72
End: 2022-02-14

## 2022-02-14 DIAGNOSIS — E78.5 HYPERLIPIDEMIA, UNSPECIFIED HYPERLIPIDEMIA TYPE: ICD-10-CM

## 2022-02-14 LAB
CHOLEST SERPL-MCNC: 156 MG/DL (ref 0–200)
HDLC SERPL-MCNC: 42 MG/DL (ref 40–60)
LDLC SERPL CALC-MCNC: 98 MG/DL (ref 0–100)
LDLC/HDLC SERPL: 2.32 {RATIO}
TRIGL SERPL-MCNC: 83 MG/DL (ref 0–150)
VLDLC SERPL-MCNC: 16 MG/DL (ref 5–40)

## 2022-02-14 PROCEDURE — 80061 LIPID PANEL: CPT

## 2022-02-14 PROCEDURE — 36415 COLL VENOUS BLD VENIPUNCTURE: CPT

## 2022-02-18 ENCOUNTER — TELEPHONE (OUTPATIENT)
Dept: CARDIOLOGY | Facility: CLINIC | Age: 72
End: 2022-02-18

## 2022-02-18 NOTE — TELEPHONE ENCOUNTER
----- Message from RAND Carlin sent at 2/16/2022  7:24 AM EST -----  Cholesterol is much better but not quite to goal.  Is she taking and Nexlizet daily?

## 2022-04-22 ENCOUNTER — HOSPITAL ENCOUNTER (OUTPATIENT)
Dept: CT IMAGING | Facility: HOSPITAL | Age: 72
Discharge: HOME | End: 2022-04-22
Admitting: NURSE PRACTITIONER

## 2022-04-22 DIAGNOSIS — R06.00 DYSPNEA, UNSPECIFIED TYPE: ICD-10-CM

## 2022-04-22 PROCEDURE — 71250 CT THORAX DX C-: CPT

## 2022-04-25 NOTE — PROGRESS NOTES
Primary Care Provider  Sally Sandhu, RAND     Referring Provider  No ref. provider found     Chief Complaint  Lung Nodule    Subjective          History of Presenting Illness  Patient is a 72-year-old female, patient of Dr. Paulino who presents for management of Sjogren's syndrome and pulmonary nodule who presents for follow-up visit today.  Patient states that since last visit her breathing has been doing well.  Patient states that since stopping statin medication she is no longer short of breath.  Patient had chest CT scan completed on 4/22/2022.  Report stated negative chest CT, no evidence of acute process.  Patient denies dyspnea, cough, wheezing, fever, chills, night sweats, swollen glands in the head and neck, unintentional weight loss, hemoptysis, purulent sputum production, dysphagia, chest pain, palpitations, chest tightness, abdominal pain, nausea, vomiting, and diarrhea. Patient also denies any myalgias, changes in sense of taste and/or smell, sore throat, any other coronavirus or flu-like symptoms.  Patient denies any leg swelling, orthopnea, paroxysmal nocturnal dyspnea.  Patient is able to perform activities of daily living.    Review of Systems   Constitutional: Negative for activity change, appetite change, chills, diaphoresis, fatigue, fever, unexpected weight gain and unexpected weight loss.        Negative for Insomnia   HENT: Negative for congestion (Nasal), mouth sores, nosebleeds, postnasal drip, sore throat, swollen glands and trouble swallowing.         Negative for Thrush  Negative for Hoarseness  Negative for Allergies/Hay Fever  Negative for Recent Head injury  Negative for Ear Fullness  Negative for Nasal or Sinus pain  Negative for Dry lips  Negative for Nasal discharge   Respiratory: Negative for apnea, cough, chest tightness, shortness of breath and wheezing.         Negative for Hemoptysis  Negative for Pleuritic pain   Cardiovascular: Negative for chest pain, palpitations and  leg swelling.        Negative for Claudication  Negative for Cyanosis  Negative for Dyspnea on exertion   Gastrointestinal: Negative for abdominal pain, diarrhea, nausea, vomiting and GERD.   Musculoskeletal: Negative for joint swelling and myalgias.        Negative for Joint pain  Negative for Joint stiffness   Skin: Negative for color change, dry skin, pallor and rash.   Neurological: Negative for syncope, weakness and headache.   Hematological: Negative for adenopathy. Does not bruise/bleed easily.        Family History   Problem Relation Age of Onset   • Stroke Other         NOT SPECIFIED   • Heart disease Other         NOT SPECIFIED   • Cancer Other         NOT SPECIFIED   • Cancer Other         NOT SPECIFIED        Social History     Socioeconomic History   • Marital status:    Tobacco Use   • Smoking status: Never Smoker   • Smokeless tobacco: Never Used   Vaping Use   • Vaping Use: Never used   Substance and Sexual Activity   • Alcohol use: Never   • Drug use: Never   • Sexual activity: Never        Past Medical History:   Diagnosis Date   • Allergies    • Coronary artery disease involving native heart without angina pectoris 5/25/2021    No significant occlusive coronary disease.  Mild atherosclerotic narrowing seen in multiple vessels in multiple locations on a cath May 25, 2021   • Hyperlipidemia    • Hypertension    • Mitral valve disease 11/10/2021     Mild mitral valve prolapse with mild mitral valve regurgitation.  On echo August 1, 2019        Immunization History   Administered Date(s) Administered   • COVID-19 (MODERNA) 1st, 2nd, 3rd Dose Only 02/23/2021, 03/23/2021, 10/27/2021, 04/04/2022   • Fluzone High Dose =>65 Years ([employer] ONLY) 09/22/2020   • Fluzone High-Dose 65+yrs 10/06/2021   • Pneumococcal Polysaccharide (PPSV23) 10/07/2020   • Shingrix 10/07/2020, 01/22/2022, 03/22/2022       Allergies   Allergen Reactions   • Sulfamethoxazole-Trimethoprim Rash          Current Outpatient  "Medications:   •  aspirin 81 MG EC tablet, Take 81 mg by mouth Daily., Disp: , Rfl:   •  Bempedoic Acid-Ezetimibe (Nexlizet) 180-10 MG tablet, Take 180 mg by mouth Daily. Indications: 3 boxes of 7 tabs each: LOT [id number] EXP: 4-2022, Disp: 21 tablet, Rfl: 0  •  carvedilol (COREG) 6.25 MG tablet, Take 1 tablet by mouth 2 (Two) Times a Day., Disp: 180 tablet, Rfl: 3  •  coenzyme Q10 100 MG capsule, Take 200 mg by mouth Daily., Disp: , Rfl:   •  hydroCHLOROthiazide (HYDRODIURIL) 12.5 MG tablet, Take 12.5 mg by mouth Daily., Disp: , Rfl:   •  lisinopril (PRINIVIL,ZESTRIL) 5 MG tablet, Take 5 mg by mouth Daily., Disp: , Rfl:   •  multivitamin with minerals tablet tablet, Take 1 tablet by mouth Daily., Disp: , Rfl:   •  tretinoin (RETIN-A) 0.025 % cream, Retin-A 0.025 % topical cream apply to the affected area(s) by topical route once daily at bedtime   Active, Disp: , Rfl:      Objective     Physical Exam  Vital Signs Reviewed  WDWN, Alert, NAD.    HEENT:  PERRL, EOMI.  OP, nares clear, no sinus tenderness  Neck:  Supple, no JVD, no thyromegaly.  Lymph: no axillary, cervical, supraclavicular lymphadenopathy noted bilaterally  Chest:  good aeration, clear to auscultation bilaterally, tympanic to percussion bilaterally, no work of breathing noted  CV: RRR, no MGR, pulses 2+, equal.  Abd:  Soft, NT, ND, + BS, no HSM  EXT:  no clubbing, no cyanosis, no edema, no joint tenderness  Neuro:  A&Ox3, CN grossly intact, no focal deficits.  Skin: No rashes or lesions noted.    Vital Signs:   /63   Pulse 63   Resp 14   Ht 167.6 cm (66\")   Wt 77.1 kg (170 lb)   SpO2 100% Comment: room air  BMI 27.44 kg/m²         Result Review :   I have personally reviewed Dr. Christiansen's last office visit note.         Assessment and Plan      Assessment:  1.  Pulmonary nodule.  Stable on recent chest CT scan.  2.  Sjogren's syndrome.  3.  Dyspnea on exertion, resolved since stopping statin medication per patient report.  4.  Anemia of " chronic disease.  5.  Never smoker.    Plan:  1.  Will repeat chest CT scan again in 1 year.  Order placed today.  2.  Follow-up with cardiologist as scheduled.  3.  Vaccination status: patient reports they are up-to-date with flu, pneumonia, and Covid vaccines.  Patient is advised to continue to follow CDC recommendations such as social distancing wearing a mask and washing hands for at least 20 seconds.  4.  Smoking status: Never smoker.  5.  Patient to call the office, 911, or go to the ER with new or worsening symptoms.  6.  Follow-up in 1 year, sooner if needed.          Follow Up   Return in about 1 year (around 4/27/2023).  Patient was given instructions and counseling regarding her condition or for health maintenance advice. Please see specific information pulled into the AVS if appropriate.

## 2022-04-27 ENCOUNTER — OFFICE VISIT (OUTPATIENT)
Dept: PULMONOLOGY | Facility: CLINIC | Age: 72
End: 2022-04-27

## 2022-04-27 VITALS
HEART RATE: 63 BPM | WEIGHT: 170 LBS | SYSTOLIC BLOOD PRESSURE: 166 MMHG | OXYGEN SATURATION: 100 % | RESPIRATION RATE: 14 BRPM | BODY MASS INDEX: 27.32 KG/M2 | DIASTOLIC BLOOD PRESSURE: 63 MMHG | HEIGHT: 66 IN

## 2022-04-27 DIAGNOSIS — R91.1 PULMONARY NODULE: ICD-10-CM

## 2022-04-27 DIAGNOSIS — M35.00 SJOGREN'S SYNDROME, WITH UNSPECIFIED ORGAN INVOLVEMENT: Primary | ICD-10-CM

## 2022-04-27 DIAGNOSIS — D63.8 ANEMIA, CHRONIC DISEASE: ICD-10-CM

## 2022-04-27 DIAGNOSIS — R06.09 DYSPNEA ON EXERTION: ICD-10-CM

## 2022-04-27 PROCEDURE — 99213 OFFICE O/P EST LOW 20 MIN: CPT | Performed by: NURSE PRACTITIONER

## 2022-04-28 ENCOUNTER — TELEPHONE (OUTPATIENT)
Dept: PULMONOLOGY | Facility: CLINIC | Age: 72
End: 2022-04-28

## 2022-04-28 NOTE — TELEPHONE ENCOUNTER
Patient left a VM stating she received two calls from us and that she would like to know what it is in regards to. Please call patient back. Thank you.

## 2022-05-31 DIAGNOSIS — E78.5 HYPERLIPIDEMIA, UNSPECIFIED HYPERLIPIDEMIA TYPE: Primary | ICD-10-CM

## 2022-06-02 ENCOUNTER — OFFICE VISIT (OUTPATIENT)
Dept: FAMILY MEDICINE CLINIC | Age: 72
End: 2022-06-02

## 2022-06-02 ENCOUNTER — LAB (OUTPATIENT)
Dept: LAB | Facility: HOSPITAL | Age: 72
End: 2022-06-02

## 2022-06-02 VITALS
DIASTOLIC BLOOD PRESSURE: 65 MMHG | BODY MASS INDEX: 26.36 KG/M2 | HEIGHT: 66 IN | WEIGHT: 164 LBS | TEMPERATURE: 98 F | HEART RATE: 57 BPM | SYSTOLIC BLOOD PRESSURE: 153 MMHG

## 2022-06-02 DIAGNOSIS — Z11.59 SCREENING FOR VIRAL DISEASE: ICD-10-CM

## 2022-06-02 DIAGNOSIS — Z86.39 HISTORY OF HYPERTHYROIDISM: ICD-10-CM

## 2022-06-02 DIAGNOSIS — Z23 ENCOUNTER FOR IMMUNIZATION: ICD-10-CM

## 2022-06-02 DIAGNOSIS — Z12.31 ENCOUNTER FOR SCREENING MAMMOGRAM FOR MALIGNANT NEOPLASM OF BREAST: ICD-10-CM

## 2022-06-02 DIAGNOSIS — E78.5 HYPERLIPIDEMIA, UNSPECIFIED HYPERLIPIDEMIA TYPE: ICD-10-CM

## 2022-06-02 DIAGNOSIS — Z00.00 MEDICARE ANNUAL WELLNESS VISIT, SUBSEQUENT: Primary | ICD-10-CM

## 2022-06-02 DIAGNOSIS — I10 HYPERTENSION, UNSPECIFIED TYPE: ICD-10-CM

## 2022-06-02 LAB
ALBUMIN SERPL-MCNC: 4.2 G/DL (ref 3.5–5.2)
ALBUMIN/GLOB SERPL: 1.1 G/DL
ALP SERPL-CCNC: 38 U/L (ref 39–117)
ALT SERPL W P-5'-P-CCNC: 20 U/L (ref 1–33)
ANION GAP SERPL CALCULATED.3IONS-SCNC: 15.6 MMOL/L (ref 5–15)
AST SERPL-CCNC: 27 U/L (ref 1–32)
BILIRUB SERPL-MCNC: 0.5 MG/DL (ref 0–1.2)
BUN SERPL-MCNC: 21 MG/DL (ref 8–23)
BUN/CREAT SERPL: 21.6 (ref 7–25)
CALCIUM SPEC-SCNC: 9.8 MG/DL (ref 8.6–10.5)
CHLORIDE SERPL-SCNC: 101 MMOL/L (ref 98–107)
CHOLEST SERPL-MCNC: 175 MG/DL (ref 0–200)
CO2 SERPL-SCNC: 22.4 MMOL/L (ref 22–29)
CREAT SERPL-MCNC: 0.97 MG/DL (ref 0.57–1)
DEPRECATED RDW RBC AUTO: 45.4 FL (ref 37–54)
EGFRCR SERPLBLD CKD-EPI 2021: 63 ML/MIN/1.73
ERYTHROCYTE [DISTWIDTH] IN BLOOD BY AUTOMATED COUNT: 13.4 % (ref 12.3–15.4)
GLOBULIN UR ELPH-MCNC: 4 GM/DL
GLUCOSE SERPL-MCNC: 85 MG/DL (ref 65–99)
HCT VFR BLD AUTO: 36 % (ref 34–46.6)
HCV AB SER DONR QL: NORMAL
HDLC SERPL-MCNC: 44 MG/DL (ref 40–60)
HGB BLD-MCNC: 11.8 G/DL (ref 12–15.9)
LDLC SERPL CALC-MCNC: 107 MG/DL (ref 0–100)
LDLC/HDLC SERPL: 2.36 {RATIO}
MCH RBC QN AUTO: 29.9 PG (ref 26.6–33)
MCHC RBC AUTO-ENTMCNC: 32.8 G/DL (ref 31.5–35.7)
MCV RBC AUTO: 91.4 FL (ref 79–97)
PLATELET # BLD AUTO: 197 10*3/MM3 (ref 140–450)
PMV BLD AUTO: 10 FL (ref 6–12)
POTASSIUM SERPL-SCNC: 4 MMOL/L (ref 3.5–5.2)
PROT SERPL-MCNC: 8.2 G/DL (ref 6–8.5)
RBC # BLD AUTO: 3.94 10*6/MM3 (ref 3.77–5.28)
SODIUM SERPL-SCNC: 139 MMOL/L (ref 136–145)
TRIGL SERPL-MCNC: 135 MG/DL (ref 0–150)
TSH SERPL DL<=0.05 MIU/L-ACNC: 1.53 UIU/ML (ref 0.27–4.2)
VLDLC SERPL-MCNC: 24 MG/DL (ref 5–40)
WBC NRBC COR # BLD: 3.35 10*3/MM3 (ref 3.4–10.8)

## 2022-06-02 PROCEDURE — 1159F MED LIST DOCD IN RCRD: CPT | Performed by: NURSE PRACTITIONER

## 2022-06-02 PROCEDURE — 85027 COMPLETE CBC AUTOMATED: CPT

## 2022-06-02 PROCEDURE — 80061 LIPID PANEL: CPT

## 2022-06-02 PROCEDURE — 84443 ASSAY THYROID STIM HORMONE: CPT

## 2022-06-02 PROCEDURE — G0009 ADMIN PNEUMOCOCCAL VACCINE: HCPCS | Performed by: NURSE PRACTITIONER

## 2022-06-02 PROCEDURE — G0439 PPPS, SUBSEQ VISIT: HCPCS | Performed by: NURSE PRACTITIONER

## 2022-06-02 PROCEDURE — 36415 COLL VENOUS BLD VENIPUNCTURE: CPT

## 2022-06-02 PROCEDURE — 80053 COMPREHEN METABOLIC PANEL: CPT

## 2022-06-02 PROCEDURE — 86803 HEPATITIS C AB TEST: CPT

## 2022-06-02 PROCEDURE — 90677 PCV20 VACCINE IM: CPT | Performed by: NURSE PRACTITIONER

## 2022-06-02 PROCEDURE — 1170F FXNL STATUS ASSESSED: CPT | Performed by: NURSE PRACTITIONER

## 2022-06-02 NOTE — ASSESSMENT & PLAN NOTE
BP with some elevation today. She has been monitoring her BP at home and has been running well. She has upcoming appointment with cardiology and will have rechecked at that time.

## 2022-06-02 NOTE — PROGRESS NOTES
The ABCs of the Annual Wellness Visit  Subsequent Medicare Wellness Visit    Chief Complaint   Patient presents with   • Medicare Wellness-subsequent      Subjective    History of Present Illness:  Anabell Richards is a 72 y.o. female who presents for a Subsequent Medicare Wellness Visit.    The following portions of the patient's history were reviewed and   updated as appropriate: allergies, current medications, past family history, past medical history, past social history, past surgical history and problem list.    Compared to one year ago, the patient feels her physical   health is better.    Compared to one year ago, the patient feels her mental   health is the same.    Kathryn was previously diagnosed with Sjogren's syndrome. Additionally, she has hypertension. Current medication includes carvedilol and hydrochlorothiazide. She notes that her BP was dropping down to 80s systolic so she stopped taking her lisinopril. Notes readings have been better controlled at home.   She also has hyperlipidemia. No longer taking statin as she had leg cramps and LAKE. Those symptoms have improved.   She has sleep apnea. Compliant with CPAP.   She has history of pulmonary nodule. Was stable on last CT. Followed by pulmonology.     Recent Hospitalizations:  She was not admitted to the hospital during the last year.       Current Medical Providers:  Patient Care Team:  Sally Sandhu APRN as PCP - General (Nurse Practitioner)  Jenny Valle MD as Consulting Physician (Cardiology)  Sean Christiansen DO as Consulting Physician (Pulmonary Disease)    Outpatient Medications Prior to Visit   Medication Sig Dispense Refill   • aspirin 81 MG EC tablet Take 81 mg by mouth Daily.     • Bempedoic Acid-Ezetimibe (Nexlizet) 180-10 MG tablet Take 180 mg by mouth Daily. Indications: 3 boxes of 7 tabs each: LOT [id number] EXP: 4-2022 21 tablet 0   • carvedilol (COREG) 6.25 MG tablet Take 1 tablet by mouth 2 (Two) Times a Day. 180  tablet 3   • coenzyme Q10 100 MG capsule Take 200 mg by mouth Daily.     • hydroCHLOROthiazide (HYDRODIURIL) 12.5 MG tablet Take 12.5 mg by mouth Daily.     • multivitamin with minerals tablet tablet Take 1 tablet by mouth Daily.     • tretinoin (RETIN-A) 0.025 % cream Retin-A 0.025 % topical cream apply to the affected area(s) by topical route once daily at bedtime   Active     • lisinopril (PRINIVIL,ZESTRIL) 5 MG tablet Take 5 mg by mouth Daily.       No facility-administered medications prior to visit.       No opioid medication identified on active medication list. I have reviewed chart for other potential  high risk medication/s and harmful drug interactions in the elderly.          Aspirin is on active medication list. Aspirin use is indicated based on review of current medical condition/s. Pros and cons of this therapy have been discussed today. Benefits of this medication outweigh potential harm.  Patient has been encouraged to continue taking this medication.  .      Patient Active Problem List   Diagnosis   • Sjogren's syndrome (HCC)   • Anemia, chronic disease   • Pulmonary nodule   • Mitral valve disease   • Recurrent UTI   • Hyperlipidemia   • Hypertension   • Allergies   • Coronary artery disease involving native heart without angina pectoris   • Dyspnea on exertion   • History of hyperthyroidism     Advance Care Planning  Advance Directive is on file.  ACP discussion was held with the patient during this visit. Patient has an advance directive in EMR which is still valid.     Review of Systems   Constitutional: Negative for chills and fever.   HENT: Negative for ear pain and sore throat.    Eyes: Negative for photophobia and visual disturbance.   Respiratory: Negative for cough and wheezing.    Cardiovascular: Negative for chest pain and leg swelling.   Gastrointestinal: Negative for abdominal pain and rectal pain.   Skin: Negative for rash.   Neurological: Negative for seizures and syncope.  "  Psychiatric/Behavioral: Negative for hallucinations and suicidal ideas.        Objective    Vitals:    06/02/22 1402 06/02/22 1407 06/02/22 1450 06/02/22 1451   BP: 148/84 152/85 168/87 153/65   BP Location: Left arm Right arm Left arm Right arm   Patient Position: Sitting Sitting Sitting Sitting   Pulse: 56 67 59 57   Temp: 98 °F (36.7 °C)      TempSrc: Oral      Weight: 74.4 kg (164 lb)      Height: 167.6 cm (66\")        Body mass index is 26.47 kg/m².  BMI is >= 25 and < 30. (Overweight) The following options were offered after discussion: weight loss educational material (shared in after visit summary)    Does the patient have evidence of cognitive impairment? No    Physical Exam            HEALTH RISK ASSESSMENT    Smoking Status:  Social History     Tobacco Use   Smoking Status Never Smoker   Smokeless Tobacco Never Used     Alcohol Consumption:  Social History     Substance and Sexual Activity   Alcohol Use Never     Fall Risk Screen:    STEADI Fall Risk Assessment was completed, and patient is at LOW risk for falls.Assessment completed on:6/2/2022    Depression Screening:  PHQ-2/PHQ-9 Depression Screening 6/2/2022   Retired PHQ-9 Total Score -   Retired Total Score -   Little Interest or Pleasure in Doing Things 0-->not at all   Feeling Down, Depressed or Hopeless 0-->not at all   PHQ-9: Brief Depression Severity Measure Score 0       Health Habits and Functional and Cognitive Screening:  Functional & Cognitive Status 6/2/2022   Do you have difficulty preparing food and eating? No   Do you have difficulty bathing yourself, getting dressed or grooming yourself? No   Do you have difficulty using the toilet? No   Do you have difficulty moving around from place to place? No   Do you have trouble with steps or getting out of a bed or a chair? No   Current Diet Well Balanced Diet   Dental Exam Up to date   Eye Exam Not up to date   Exercise (times per week) 7 times per week   Current Exercises Include Home " Exercise Program (TV, Computer, Etc.)   Do you need help using the phone?  No   Are you deaf or do you have serious difficulty hearing?  No   Do you need help with transportation? No   Do you need help shopping? No   Do you need help preparing meals?  No   Do you need help with housework?  No   Do you need help with laundry? No   Do you need help taking your medications? No   Do you need help managing money? No   Do you ever drive or ride in a car without wearing a seat belt? No   Have you felt unusual stress, anger or loneliness in the last month? No   Who do you live with? Spouse   If you need help, do you have trouble finding someone available to you? No   Have you been bothered in the last four weeks by sexual problems? No   Do you have difficulty concentrating, remembering or making decisions? No       Age-appropriate Screening Schedule:  Refer to the list below for future screening recommendations based on patient's age, sex and/or medical conditions. Orders for these recommended tests are listed in the plan section. The patient has been provided with a written plan.    Health Maintenance   Topic Date Due   • TDAP/TD VACCINES (1 - Tdap) 06/02/2023 (Originally 3/3/1969)   • INFLUENZA VACCINE  08/01/2022   • MAMMOGRAM  01/11/2023   • LIPID PANEL  02/14/2023   • DXA SCAN  01/11/2024   • ZOSTER VACCINE  Completed              Assessment & Plan   CMS Preventative Services Quick Reference  Risk Factors Identified During Encounter  Immunizations Discussed/Encouraged (specific Immunizations; Tdap and Prevnar 20 (Pneumococcal 20-valent conjugate)  Obesity/Overweight   The above risks/problems have been discussed with the patient.  Follow up actions/plans if indicated are seen below in the Assessment/Plan Section.  Pertinent information has been shared with the patient in the After Visit Summary.    Diagnoses and all orders for this visit:    1. Medicare annual wellness visit, subsequent (Primary)  Comments:  Counseled  health maintenance recommendations.     2. Encounter for immunization  -     Pneumococcal Conjugate Vaccine 20-Valent (PCV20)    3. Encounter for screening mammogram for malignant neoplasm of breast  -     Mammo Screening Digital Tomosynthesis Bilateral With CAD; Future    4. History of hyperthyroidism  Assessment & Plan:  Checking labs along with labs being completed that were ordered by cardiology    Orders:  -     TSH; Future    5. Screening for viral disease  -     Hepatitis C antibody; Future    6. Hypertension, unspecified type  Assessment & Plan:  BP with some elevation today. She has been monitoring her BP at home and has been running well. She has upcoming appointment with cardiology and will have rechecked at that time.         Follow Up:   Return in about 1 year (around 6/2/2023) for Medicare Wellness.     An After Visit Summary and PPPS were made available to the patient.

## 2022-06-27 ENCOUNTER — OFFICE VISIT (OUTPATIENT)
Dept: CARDIOLOGY | Facility: CLINIC | Age: 72
End: 2022-06-27

## 2022-06-27 VITALS
SYSTOLIC BLOOD PRESSURE: 128 MMHG | BODY MASS INDEX: 26.03 KG/M2 | WEIGHT: 162 LBS | HEART RATE: 68 BPM | HEIGHT: 66 IN | DIASTOLIC BLOOD PRESSURE: 60 MMHG

## 2022-06-27 DIAGNOSIS — E78.5 HYPERLIPIDEMIA, UNSPECIFIED HYPERLIPIDEMIA TYPE: ICD-10-CM

## 2022-06-27 DIAGNOSIS — I05.9 MITRAL VALVE DISEASE: Chronic | ICD-10-CM

## 2022-06-27 DIAGNOSIS — I10 PRIMARY HYPERTENSION: ICD-10-CM

## 2022-06-27 DIAGNOSIS — I25.10 CORONARY ARTERY DISEASE INVOLVING NATIVE HEART WITHOUT ANGINA PECTORIS, UNSPECIFIED VESSEL OR LESION TYPE: Primary | Chronic | ICD-10-CM

## 2022-06-27 PROCEDURE — 99214 OFFICE O/P EST MOD 30 MIN: CPT | Performed by: INTERNAL MEDICINE

## 2022-06-27 PROCEDURE — 93000 ELECTROCARDIOGRAM COMPLETE: CPT | Performed by: INTERNAL MEDICINE

## 2022-06-27 RX ORDER — HYDROCHLOROTHIAZIDE 12.5 MG/1
12.5 TABLET ORAL EVERY OTHER DAY
Qty: 45 TABLET | Refills: 3 | Status: CANCELLED | OUTPATIENT
Start: 2022-06-27

## 2022-06-27 RX ORDER — LISINOPRIL 5 MG/1
5 TABLET ORAL DAILY
Qty: 90 TABLET | Refills: 3 | Status: SHIPPED | OUTPATIENT
Start: 2022-06-27

## 2022-06-27 RX ORDER — BEMPEDOIC ACID AND EZETIMIBE 180; 10 MG/1; MG/1
1 TABLET, FILM COATED ORAL DAILY
Qty: 90 TABLET | Refills: 3 | Status: SHIPPED | OUTPATIENT
Start: 2022-06-27 | End: 2022-12-06

## 2022-06-27 RX ORDER — BEMPEDOIC ACID AND EZETIMIBE 180; 10 MG/1; MG/1
1 TABLET, FILM COATED ORAL DAILY
COMMUNITY
End: 2022-06-27 | Stop reason: SDUPTHER

## 2022-06-27 RX ORDER — CARVEDILOL 3.12 MG/1
3.12 TABLET ORAL 2 TIMES DAILY
Qty: 180 TABLET | Refills: 3 | Status: SHIPPED | OUTPATIENT
Start: 2022-06-27

## 2022-06-27 NOTE — ASSESSMENT & PLAN NOTE
Her blood pressure is reasonably regular.  I would prefer to have her on an ACE inhibitor.  I am therefore going to stop hydrochlorothiazide and I will restart the lisinopril but only 5 mg a day.  She can continue carvedilol 3.125 mg twice daily.  1 week after the change she will do her 2 blood pressure log and bring it to us

## 2022-06-27 NOTE — ASSESSMENT & PLAN NOTE
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  She has tried Zocor Lipitor and Crestor and has had severe reactions to them including muscle aches and a feeling of shortness of breath.  Since she stopped atorvastatin she feels great denies any muscle aches and denies shortness of breath on exertion.  She has been on nexlizet since December and has not had any trouble.

## 2022-06-27 NOTE — ASSESSMENT & PLAN NOTE
Her lipids are not at goal.  Her LDL is 109 on nexlizet.  I have suggested to her use of PCSK9 inhibitor.  She prefers Leqvio since it would require long-term use of only twice a year.  We will see if she can get precertified.

## 2022-06-27 NOTE — PROGRESS NOTES
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  She has tried Zocor Lipitor and Crestor and has had severe reactions to them including muscle aches and a feeling of shortness of breath.  Since she stopped atorvastatin she feels great denies any muscle aches and denies shortness of breath on exertion.  She has been on nexlizet since December and has not had any trouble.    She has been trying to lose weight and has lost over 10 pounds.  Her blood pressure started getting lower and lower and so she cut out the Zestril and cut down the Coreg to half the dose.  I am going to suggest to her some changes to her regimen      Past Medical History:   Diagnosis Date   • Allergies    • Coronary artery disease involving native heart without angina pectoris 5/25/2021    No significant occlusive coronary disease.  Mild atherosclerotic narrowing seen in multiple vessels in multiple locations on a cath May 25, 2021   • Hyperlipidemia    • Hypertension    • Mitral valve disease 11/10/2021     Mild mitral valve prolapse with mild mitral valve regurgitation.  On echo August 1, 2019       Allergies   Allergen Reactions   • Sulfamethoxazole-Trimethoprim Rash        Past Surgical History:   Procedure Laterality Date   • ABDOMINAL SURGERY      URETHAL SLING        Social History     Tobacco Use   • Smoking status: Never Smoker   • Smokeless tobacco: Never Used   Vaping Use   • Vaping Use: Never used   Substance Use Topics   • Alcohol use: Never   • Drug use: Never       Family History   Problem Relation Age of Onset   • Stroke Other         NOT SPECIFIED   • Heart disease Other         NOT SPECIFIED   • Cancer Other         NOT SPECIFIED   • Cancer Other         NOT SPECIFIED     "    Prior to Admission medications    Medication Sig Start Date End Date Taking? Authorizing Provider   aspirin 81 MG EC tablet Take 81 mg by mouth Daily.   Yes Denita Padilla MD Bempedoic Acid-Ezetimibe (Nexlizet) 180-10 MG tablet Take 180 mg by mouth Daily. Indications: 3 boxes of 7 tabs each: LOT [id number] EXP: 4-2022 12/23/21  Yes Liliane De La Rosa APRN   carvedilol (COREG) 6.25 MG tablet Take 1 tablet by mouth 2 (Two) Times a Day.  Patient taking differently: Take 3.125 mg by mouth 2 (Two) Times a Day With Meals. 11/17/21  Yes Liliane De La Rosa APRN   coenzyme Q10 100 MG capsule Take 200 mg by mouth Daily.   Yes Denita Padilla MD   hydroCHLOROthiazide (HYDRODIURIL) 12.5 MG tablet Take 12.5 mg by mouth Every Other Day. 4/4/21  Yes Denita Padilla MD   multivitamin with minerals tablet tablet Take 1 tablet by mouth Daily.   Yes Denita Padilla MD   tretinoin (RETIN-A) 0.025 % cream Retin-A 0.025 % topical cream apply to the affected area(s) by topical route once daily at bedtime   Active   Yes Denita Padilla MD        Review of Systems   Constitutional: Negative for fatigue.   Respiratory: Negative for cough and shortness of breath.    Cardiovascular: Negative for chest pain, palpitations and leg swelling.   Neurological: Negative for dizziness.        Objective     /60   Pulse 68   Ht 167.6 cm (66\")   Wt 73.5 kg (162 lb)   BMI 26.15 kg/m²       Physical Exam  Constitutional:       General: She is awake.      Appearance: Normal appearance.   Neck:      Thyroid: No thyromegaly.      Vascular: No carotid bruit or JVD.   Cardiovascular:      Rate and Rhythm: Normal rate and regular rhythm.      Chest Wall: PMI is not displaced.      Pulses: Normal pulses.      Heart sounds: Normal heart sounds, S1 normal and S2 normal. No murmur heard.    No friction rub. No gallop. No S3 or S4 sounds.   Pulmonary:      Effort: Pulmonary effort is normal.      Breath sounds: Normal breath " sounds and air entry. No wheezing, rhonchi or rales.   Abdominal:      General: Bowel sounds are normal.      Palpations: Abdomen is soft. There is no mass.      Tenderness: There is no abdominal tenderness.   Musculoskeletal:      Cervical back: Neck supple.      Right lower leg: No edema.      Left lower leg: No edema.   Neurological:      Mental Status: She is alert and oriented to person, place, and time.   Psychiatric:         Mood and Affect: Mood normal.         Behavior: Behavior is cooperative.       Lab Results   Component Value Date     05/25/2021    BNP 75 01/17/2019     CMP    CMP 12/3/21 1/5/22 6/2/22   Glucose 90 96 85   BUN 31 (A) 23 21   Creatinine 1.04 (A) 0.96 0.97   eGFR Non African Am 53 (A) 58 (A)    Sodium 133 (A) 140 139   Potassium 4.2 4.3 4.0   Chloride 98 103 101   Calcium 10.1 9.7 9.8   Albumin 4.50  4.20   Total Bilirubin 0.3  0.5   Alkaline Phosphatase 40  38 (A)   AST (SGOT) 26  27   ALT (SGPT) 23  20   (A) Abnormal value            CBC w/diff    CBC w/Diff 11/5/21 6/2/22   WBC 3.05 (A) 3.35 (A)   RBC 4.00 3.94   Hemoglobin 12.0 11.8 (A)   Hematocrit 36.0 36.0   MCV 90.0 91.4   MCH 30.0 29.9   MCHC 33.3 32.8   RDW 13.3 13.4   Platelets 166 197   Neutrophil Rel % 54.1    Immature Granulocyte Rel % 0.7 (A)    Lymphocyte Rel % 30.8    Monocyte Rel % 10.8    Eosinophil Rel % 3.3    Basophil Rel % 0.3    (A) Abnormal value             Lipid Panel    Lipid Panel 11/5/21 2/14/22 6/2/22   Total Cholesterol 229 (A) 156 175   Triglycerides 160 (A) 83 135   HDL Cholesterol 41 42 44   VLDL Cholesterol 29 16 24   LDL Cholesterol  159 (A) 98 107 (A)   LDL/HDL Ratio 3.80 2.32 2.36   (A) Abnormal value             Lab Results   Component Value Date    TSH 1.530 06/02/2022    TSH 1.690 11/05/2021    TSH 3.390 05/25/2021      Lab Results   Component Value Date    FREET4 1.1 05/25/2021    FREET4 1.2 10/22/2019      No results found for: DDIMERQUANT  No results found for: MG   No results found for:  DIGOXIN              Result Review :                    ECG 12 Lead    Date/Time: 6/27/2022 3:21 PM  Performed by: Jenny Valle MD  Authorized by: Jenny Valle MD   Comments: Sinus rhythm occasional .  No change compared to prior EKG                Assessment and Plan        Diagnoses and all orders for this visit:    1. 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  She has tried Zocor Lipitor and Crestor and has had severe reactions to them including muscle aches and a feeling of shortness of breath.  Since she stopped atorvastatin she feels great denies any muscle aches and denies shortness of breath on exertion.  She has been on nexlizet since December and has not had any trouble.    Orders:  -     carvedilol (COREG) 3.125 MG tablet; Take 1 tablet by mouth 2 (Two) Times a Day.  Dispense: 180 tablet; Refill: 3  -     Lipid Panel; Future  -     Comprehensive Metabolic Panel; Future  -     Magnesium; Future  -     Lipid Panel; Future  -     Comprehensive Metabolic Panel; Future  -     Magnesium; Future    2. Mitral valve disease  -     carvedilol (COREG) 3.125 MG tablet; Take 1 tablet by mouth 2 (Two) Times a Day.  Dispense: 180 tablet; Refill: 3  -     Lipid Panel; Future  -     Comprehensive Metabolic Panel; Future  -     Magnesium; Future  -     Lipid Panel; Future  -     Comprehensive Metabolic Panel; Future  -     Magnesium; Future    3. Hyperlipidemia, unspecified hyperlipidemia type  Assessment & Plan:  Her lipids are not at goal.  Her LDL is 109 on nexlizet.  I have suggested to her use of PCSK9 inhibitor.  She prefers Leqvio since it would require long-term use of only twice a year.  We will see if she can get precertified.    Orders:  -     Lipid Panel; Future  -     Comprehensive Metabolic  Panel; Future  -     Magnesium; Future  -     Lipid Panel; Future  -     Comprehensive Metabolic Panel; Future  -     Magnesium; Future    4. Primary hypertension  Assessment & Plan:  Her blood pressure is reasonably regular.  I would prefer to have her on an ACE inhibitor.  I am therefore going to stop hydrochlorothiazide and I will restart the lisinopril but only 5 mg a day.  She can continue carvedilol 3.125 mg twice daily.  1 week after the change she will do her 2 blood pressure log and bring it to us    Orders:  -     carvedilol (COREG) 3.125 MG tablet; Take 1 tablet by mouth 2 (Two) Times a Day.  Dispense: 180 tablet; Refill: 3  -     Lipid Panel; Future  -     Comprehensive Metabolic Panel; Future  -     Magnesium; Future  -     Lipid Panel; Future  -     Comprehensive Metabolic Panel; Future  -     Magnesium; Future    Other orders  -     Bempedoic Acid-Ezetimibe (Nexlizet) 180-10 MG tablet; Take 1 tablet by mouth Daily.  Dispense: 90 tablet; Refill: 3  -     lisinopril (PRINIVIL,ZESTRIL) 5 MG tablet; Take 1 tablet by mouth Daily.  Dispense: 90 tablet; Refill: 3  -     ECG 12 Lead          Follow Up     Return in about 9 months (around 3/27/2023) for with Dr Corona.    Patient was given instructions and counseling regarding her condition or for health maintenance advice. Please see specific information pulled into the AVS if appropriate.     Jenny Valle MD  06/27/22 14:22 EDT

## 2022-06-30 ENCOUNTER — OFFICE VISIT (OUTPATIENT)
Dept: FAMILY MEDICINE CLINIC | Age: 72
End: 2022-06-30

## 2022-06-30 VITALS
TEMPERATURE: 98.3 F | DIASTOLIC BLOOD PRESSURE: 80 MMHG | HEIGHT: 66 IN | BODY MASS INDEX: 25.94 KG/M2 | OXYGEN SATURATION: 98 % | HEART RATE: 65 BPM | SYSTOLIC BLOOD PRESSURE: 148 MMHG | WEIGHT: 161.4 LBS

## 2022-06-30 DIAGNOSIS — H02.89 IRRITATION OF EYELID: Primary | ICD-10-CM

## 2022-06-30 PROCEDURE — 99213 OFFICE O/P EST LOW 20 MIN: CPT | Performed by: PHYSICIAN ASSISTANT

## 2022-06-30 RX ORDER — ERYTHROMYCIN 5 MG/G
OINTMENT OPHTHALMIC
Qty: 3.5 G | Refills: 0 | Status: SHIPPED | OUTPATIENT
Start: 2022-06-30 | End: 2022-07-07

## 2022-06-30 RX ORDER — ERYTHROMYCIN 5 MG/G
OINTMENT OPHTHALMIC
Qty: 3.5 G | Refills: 0 | Status: SHIPPED | OUTPATIENT
Start: 2022-06-30 | End: 2022-06-30

## 2022-06-30 NOTE — PROGRESS NOTES
"Subjective     CHIEF COMPLAINT    Chief Complaint   Patient presents with   • Eye Pain     (R) pt states it happened last night when she thought she got something in it. Feels scratchy and cloudy.             This is a 72-year-old female presenting to the clinic complaining of right eye watering and \"aching\" since yesterday.  She states she felt like she had something in her eye and started rubbing it last night.  It then got very red and watery and felt achy.  Today it has improved quite a bit but she still notices some red to her inner lower eyelid.  No changes in her vision.  No nausea or vomiting and no headaches.  She has seen physicians at the [facility] [place] and has had cataract surgery about 5 years ago.            Review of Systems   Constitutional: Negative for chills and fever.   Eyes: Positive for pain (resolved), discharge (\"Watering\"), redness and itching. Negative for photophobia and visual disturbance.   Gastrointestinal: Negative for nausea and vomiting.   Neurological: Negative for headaches.            Past Medical History:   Diagnosis Date   • Allergies    • Coronary artery disease involving native heart without angina pectoris 5/25/2021    No significant occlusive coronary disease.  Mild atherosclerotic narrowing seen in multiple vessels in multiple locations on a cath May 25, 2021   • Hyperlipidemia    • Hypertension    • Mitral valve disease 11/10/2021     Mild mitral valve prolapse with mild mitral valve regurgitation.  On echo August 1, 2019            Past Surgical History:   Procedure Laterality Date   • ABDOMINAL SURGERY      URETHAL SLING            Family History   Problem Relation Age of Onset   • Stroke Other         NOT SPECIFIED   • Heart disease Other         NOT SPECIFIED   • Cancer Other         NOT SPECIFIED   • Cancer Other         NOT SPECIFIED            Social History     Socioeconomic History   • Marital status:    Tobacco Use   • Smoking status: Never Smoker " "  • Smokeless tobacco: Never Used   Vaping Use   • Vaping Use: Never used   Substance and Sexual Activity   • Alcohol use: Never   • Drug use: Never   • Sexual activity: Never            Allergies   Allergen Reactions   • Sulfamethoxazole-Trimethoprim Rash            Current Outpatient Medications on File Prior to Visit   Medication Sig Dispense Refill   • aspirin 81 MG EC tablet Take 81 mg by mouth Daily.     • Bempedoic Acid-Ezetimibe (Nexlizet) 180-10 MG tablet Take 1 tablet by mouth Daily. 90 tablet 3   • carvedilol (COREG) 3.125 MG tablet Take 1 tablet by mouth 2 (Two) Times a Day. 180 tablet 3   • coenzyme Q10 100 MG capsule Take 200 mg by mouth Daily.     • lisinopril (PRINIVIL,ZESTRIL) 5 MG tablet Take 1 tablet by mouth Daily. 90 tablet 3   • multivitamin with minerals tablet tablet Take 1 tablet by mouth Daily.     • tretinoin (RETIN-A) 0.025 % cream Retin-A 0.025 % topical cream apply to the affected area(s) by topical route once daily at bedtime   Active       No current facility-administered medications on file prior to visit.            /80 (BP Location: Right arm, Patient Position: Sitting, Cuff Size: Adult)   Pulse 65   Temp 98.3 °F (36.8 °C) (Oral)   Ht 167.6 cm (65.98\")   Wt 73.2 kg (161 lb 6.4 oz)   SpO2 98% Comment: room air  BMI 26.06 kg/m²          Objective     Physical Exam  Vitals and nursing note reviewed.   Constitutional:       General: She is not in acute distress.     Appearance: Normal appearance.   Eyes:      General:         Right eye: No discharge.         Left eye: No discharge.      Extraocular Movements: Extraocular movements intact.      Conjunctiva/sclera: Conjunctivae normal.      Pupils: Pupils are equal, round, and reactive to light.      Comments: Small area of erythmea/injection to right lower eyelid. No FB visualized.    Pulmonary:      Effort: Pulmonary effort is normal. No respiratory distress.   Skin:     General: Skin is warm and dry.   Neurological:      " Mental Status: She is alert and oriented to person, place, and time.   Psychiatric:         Mood and Affect: Mood normal.         Behavior: Behavior normal.              Procedures                    Lab Results (last 24 hours)     ** No results found for the last 24 hours. **                No Radiology Exams Resulted Within Past 24 Hours                     Diagnoses and all orders for this visit:    1. Irritation of eyelid (Primary)  Comments:  Follow-up with [facility] or other optometry office if no improvement in 1 to 2 days.  Orders:  -     erythromycin (ROMYCIN) 5 MG/GM ophthalmic ointment; Administer  to the right eye Every 4 (Four) Hours While Awake for 7 days.  Dispense: 3.5 g; Refill: 0    Other orders  -     Discontinue: erythromycin (ROMYCIN) 5 MG/GM ophthalmic ointment; Administer  to the right eye Every 4 (Four) Hours While Awake for 7 days.  Dispense: 3.5 g; Refill: 0                           FOR FULL DISCHARGE INSTRUCTIONS/COMMENTS/HANDOUTS please see the AVS

## 2022-07-06 ENCOUNTER — TRANSCRIBE ORDERS (OUTPATIENT)
Dept: ADMINISTRATIVE | Facility: HOSPITAL | Age: 72
End: 2022-07-06

## 2022-07-06 DIAGNOSIS — I25.10 DISEASE OF CARDIOVASCULAR SYSTEM: ICD-10-CM

## 2022-07-06 DIAGNOSIS — E78.5 HYPERLIPIDEMIA, UNSPECIFIED HYPERLIPIDEMIA TYPE: Primary | ICD-10-CM

## 2022-07-07 ENCOUNTER — APPOINTMENT (OUTPATIENT)
Dept: PREADMISSION TESTING | Facility: HOSPITAL | Age: 72
End: 2022-07-07

## 2022-07-08 ENCOUNTER — PRE-ADMISSION TESTING (OUTPATIENT)
Dept: PREADMISSION TESTING | Facility: HOSPITAL | Age: 72
End: 2022-07-08

## 2022-07-08 VITALS
DIASTOLIC BLOOD PRESSURE: 63 MMHG | BODY MASS INDEX: 26.2 KG/M2 | OXYGEN SATURATION: 99 % | HEIGHT: 66 IN | HEART RATE: 64 BPM | SYSTOLIC BLOOD PRESSURE: 145 MMHG | RESPIRATION RATE: 16 BRPM | TEMPERATURE: 97.9 F | WEIGHT: 163 LBS

## 2022-07-08 LAB
ANION GAP SERPL CALCULATED.3IONS-SCNC: 10.2 MMOL/L (ref 5–15)
BUN SERPL-MCNC: 18 MG/DL (ref 8–23)
BUN/CREAT SERPL: 22 (ref 7–25)
CALCIUM SPEC-SCNC: 9.2 MG/DL (ref 8.6–10.5)
CHLORIDE SERPL-SCNC: 103 MMOL/L (ref 98–107)
CO2 SERPL-SCNC: 26.8 MMOL/L (ref 22–29)
CREAT SERPL-MCNC: 0.82 MG/DL (ref 0.57–1)
DEPRECATED RDW RBC AUTO: 44 FL (ref 37–54)
EGFRCR SERPLBLD CKD-EPI 2021: 77.1 ML/MIN/1.73
ERYTHROCYTE [DISTWIDTH] IN BLOOD BY AUTOMATED COUNT: 13.1 % (ref 12.3–15.4)
GLUCOSE SERPL-MCNC: 100 MG/DL (ref 65–99)
HCT VFR BLD AUTO: 33.9 % (ref 34–46.6)
HGB BLD-MCNC: 11.3 G/DL (ref 12–15.9)
MCH RBC QN AUTO: 30.5 PG (ref 26.6–33)
MCHC RBC AUTO-ENTMCNC: 33.3 G/DL (ref 31.5–35.7)
MCV RBC AUTO: 91.6 FL (ref 79–97)
PLATELET # BLD AUTO: 193 10*3/MM3 (ref 140–450)
PMV BLD AUTO: 10.9 FL (ref 6–12)
POTASSIUM SERPL-SCNC: 3.7 MMOL/L (ref 3.5–5.2)
RBC # BLD AUTO: 3.7 10*6/MM3 (ref 3.77–5.28)
SODIUM SERPL-SCNC: 140 MMOL/L (ref 136–145)
WBC NRBC COR # BLD: 2.82 10*3/MM3 (ref 3.4–10.8)

## 2022-07-08 PROCEDURE — 36415 COLL VENOUS BLD VENIPUNCTURE: CPT

## 2022-07-08 PROCEDURE — 85027 COMPLETE CBC AUTOMATED: CPT

## 2022-07-08 PROCEDURE — 80048 BASIC METABOLIC PNL TOTAL CA: CPT

## 2022-07-08 RX ORDER — PREDNISOLONE ACETATE 10 MG/ML
SUSPENSION/ DROPS OPHTHALMIC
COMMUNITY
Start: 2022-07-06 | End: 2022-12-06

## 2022-07-08 NOTE — DISCHARGE INSTRUCTIONS
Take the following medications the morning of surgery: CARVEDILOL    ARRIVE AT 9:00 AM      If you are on prescription narcotic pain medication to control your pain you may also take that medication the morning of surgery.    General Instructions:  Do not eat solid food after midnight the night before surgery.  You may drink clear liquids day of surgery but must stop at least one hour before your hospital arrival time. CUTOFF TIME IS 8:00 AM  It is beneficial for you to have a clear drink that contains carbohydrates the day of surgery.  We suggest a 12 to 20 ounce bottle of Gatorade or Powerade for non-diabetic patients or a 12 to 20 ounce bottle of G2 or Powerade Zero for diabetic patients. (Pediatric patients, are not advised to drink a 12 to 20 ounce carbohydrate drink)    Clear liquids are liquids you can see through.  Nothing red in color.     Plain water                               Sports drinks  Sodas                                   Gelatin (Jell-O)  Fruit juices without pulp such as white grape juice and apple juice  Popsicles that contain no fruit or yogurt  Tea or coffee (no cream or milk added)  Gatorade / Powerade  G2 / Powerade Zero    Patients who avoid smoking, chewing tobacco and alcohol for 4 weeks prior to surgery have a reduced risk of post-operative complications.  Quit smoking as many days before surgery as you can.  Do not smoke, use chewing tobacco or drink alcohol the day of surgery.   If applicable bring your C-PAP/ BI-PAP machine.  Bring any papers given to you in the doctor’s office.  Wear clean comfortable clothes.  Do not wear contact lenses, false eyelashes or make-up.  Bring a case for your glasses.   Bring crutches or walker if applicable.  Remove all piercings.  Leave jewelry and any other valuables at home.  Hair extensions with metal clips must be removed prior to surgery.  The Pre-Admission Testing nurse will instruct you to bring medications if unable to obtain an accurate  list in Pre-Admission Testing.          Preventing a Surgical Site Infection:  For 2 to 3 days before surgery, avoid shaving with a razor because the razor can irritate skin and make it easier to develop an infection.    Any areas of open skin can increase the risk of a post-operative wound infection by allowing bacteria to enter and travel throughout the body.  Notify your surgeon if you have any skin wounds / rashes even if it is not near the expected surgical site.  The area will need assessed to determine if surgery should be delayed until it is healed.  The night prior to surgery shower using a fresh bar of anti-bacterial soap (such as Dial) and clean washcloth.  Sleep in a clean bed with clean clothing.  Do not allow pets to sleep with you.  Shower on the morning of surgery using a fresh bar of anti-bacterial soap (such as Dial) and clean washcloth.  Dry with a clean towel and dress in clean clothing.  Ask your surgeon if you will be receiving antibiotics prior to surgery.  Make sure you, your family, and all healthcare providers clean their hands with soap and water or an alcohol based hand  before caring for you or your wound.    Day of surgery:  Your arrival time is approximately two hours before your scheduled surgery time.  Upon arrival, a Pre-op nurse and Anesthesiologist will review your health history, obtain vital signs, and answer questions you may have.  The only belongings needed at this time will be a list of your home medications and if applicable your C-PAP/BI-PAP machine.  A Pre-op nurse will start an IV and you may receive medication in preparation for surgery, including something to help you relax.     Please be aware that surgery does come with discomfort.  We want to make every effort to control your discomfort so please discuss any uncontrolled symptoms with your nurse.   Your doctor will most likely have prescribed pain medications.      If you are going home after surgery you  will receive individualized written care instructions before being discharged.  A responsible adult must drive you to and from the hospital on the day of your surgery and stay with you for 24 hours.  Discharge prescriptions can be filled by the hospital pharmacy during regular pharmacy hours.  If you are having surgery late in the day/evening your prescription may be e-prescribed to your pharmacy.  Please verify your pharmacy hours or chose a 24 hour pharmacy to avoid not having access to your prescription because your pharmacy has closed for the day.    If you are staying overnight following surgery, you will be transported to your hospital room following the recovery period.  [facility] has all private rooms.    If you have any questions please call Pre-Admission Testing at [phone].  Deductibles and co-payments are collected on the day of service. Please be prepared to pay the required co-pay, deductible or deposit on the day of service as defined by your plan.    Patient Education for Self-Quarantine Process    Following your COVID testing, we strongly recommend that you wear a mask when you are with other people and practice social distancing.   Limit your activities to only required outings.  Wash your hands with soap and water frequently for at least 20 seconds.   Avoid touching your eyes, nose and mouth with unwashed hands.  Do not share anything - utensils, drinking glasses, food from the same bowl.   Sanitize household surfaces daily. Include all high touch areas (door handles, light switches, phones, countertops, etc.)    Call your surgeon immediately if you experience any of the following symptoms:  Sore Throat  Shortness of Breath or difficulty breathing  Cough  Chills  Body soreness or muscle pain  Headache  Fever  New loss of taste or smell  Do not arrive for your surgery ill.  Your procedure will need to be rescheduled to another time.  You will need to call your physician before  the day of surgery to avoid any unnecessary exposure to hospital staff as well as other patients.

## 2022-07-11 ENCOUNTER — TELEPHONE (OUTPATIENT)
Dept: CARDIOLOGY | Facility: CLINIC | Age: 72
End: 2022-07-11

## 2022-07-11 NOTE — TELEPHONE ENCOUNTER
Pt called and wanted to verify if she is supposed to be taking a statin along with her Lequvo injection. Pt stated she has read that your supposed to be on a statin with the injection, but says that she was told she wouldn't have to take any other medication with the injection.     Please advise.

## 2022-07-12 ENCOUNTER — LAB (OUTPATIENT)
Dept: LAB | Facility: HOSPITAL | Age: 72
End: 2022-07-12

## 2022-07-12 LAB — SARS-COV-2 ORF1AB RESP QL NAA+PROBE: NOT DETECTED

## 2022-07-12 PROCEDURE — U0004 COV-19 TEST NON-CDC HGH THRU: HCPCS

## 2022-07-12 PROCEDURE — U0005 INFEC AGEN DETEC AMPLI PROBE: HCPCS

## 2022-07-12 PROCEDURE — C9803 HOPD COVID-19 SPEC COLLECT: HCPCS

## 2022-07-13 ENCOUNTER — HOSPITAL ENCOUNTER (OUTPATIENT)
Dept: INFUSION THERAPY | Facility: HOSPITAL | Age: 72
End: 2022-07-13

## 2022-07-14 ENCOUNTER — HOSPITAL ENCOUNTER (OUTPATIENT)
Facility: HOSPITAL | Age: 72
Setting detail: HOSPITAL OUTPATIENT SURGERY
Discharge: HOME | End: 2022-07-14
Attending: OPHTHALMOLOGY | Admitting: OPHTHALMOLOGY

## 2022-07-14 ENCOUNTER — ANESTHESIA EVENT (OUTPATIENT)
Dept: PERIOP | Facility: HOSPITAL | Age: 72
End: 2022-07-14

## 2022-07-14 ENCOUNTER — ANESTHESIA (OUTPATIENT)
Dept: PERIOP | Facility: HOSPITAL | Age: 72
End: 2022-07-14

## 2022-07-14 VITALS
SYSTOLIC BLOOD PRESSURE: 168 MMHG | HEIGHT: 66 IN | WEIGHT: 160.5 LBS | HEART RATE: 60 BPM | BODY MASS INDEX: 25.79 KG/M2 | TEMPERATURE: 98.7 F | RESPIRATION RATE: 16 BRPM | DIASTOLIC BLOOD PRESSURE: 68 MMHG | OXYGEN SATURATION: 98 %

## 2022-07-14 PROCEDURE — 25010000002 FENTANYL CITRATE (PF) 50 MCG/ML SOLUTION: Performed by: ANESTHESIOLOGY

## 2022-07-14 PROCEDURE — 25010000002 PROPOFOL 10 MG/ML EMULSION: Performed by: NURSE ANESTHETIST, CERTIFIED REGISTERED

## 2022-07-14 PROCEDURE — 25010000002 DEXAMETHASONE PER 1 MG: Performed by: NURSE ANESTHETIST, CERTIFIED REGISTERED

## 2022-07-14 RX ORDER — HYDROCODONE BITARTRATE AND ACETAMINOPHEN 5; 325 MG/1; MG/1
1 TABLET ORAL EVERY 4 HOURS PRN
Qty: 10 TABLET | Refills: 0 | Status: SHIPPED | OUTPATIENT
Start: 2022-07-14 | End: 2022-12-06

## 2022-07-14 RX ORDER — DIPHENHYDRAMINE HCL 25 MG
25 CAPSULE ORAL
Status: DISCONTINUED | OUTPATIENT
Start: 2022-07-14 | End: 2022-07-14 | Stop reason: HOSPADM

## 2022-07-14 RX ORDER — LABETALOL HYDROCHLORIDE 5 MG/ML
5 INJECTION, SOLUTION INTRAVENOUS
Status: DISCONTINUED | OUTPATIENT
Start: 2022-07-14 | End: 2022-07-14 | Stop reason: HOSPADM

## 2022-07-14 RX ORDER — FAMOTIDINE 10 MG/ML
20 INJECTION, SOLUTION INTRAVENOUS ONCE
Status: COMPLETED | OUTPATIENT
Start: 2022-07-14 | End: 2022-07-14

## 2022-07-14 RX ORDER — DIPHENHYDRAMINE HYDROCHLORIDE 50 MG/ML
12.5 INJECTION INTRAMUSCULAR; INTRAVENOUS
Status: DISCONTINUED | OUTPATIENT
Start: 2022-07-14 | End: 2022-07-14 | Stop reason: HOSPADM

## 2022-07-14 RX ORDER — LIDOCAINE HYDROCHLORIDE 20 MG/ML
INJECTION, SOLUTION INFILTRATION; PERINEURAL AS NEEDED
Status: DISCONTINUED | OUTPATIENT
Start: 2022-07-14 | End: 2022-07-14 | Stop reason: SURG

## 2022-07-14 RX ORDER — ONDANSETRON 2 MG/ML
4 INJECTION INTRAMUSCULAR; INTRAVENOUS ONCE AS NEEDED
Status: DISCONTINUED | OUTPATIENT
Start: 2022-07-14 | End: 2022-07-14 | Stop reason: HOSPADM

## 2022-07-14 RX ORDER — FENTANYL CITRATE 50 UG/ML
50 INJECTION, SOLUTION INTRAMUSCULAR; INTRAVENOUS
Status: DISCONTINUED | OUTPATIENT
Start: 2022-07-14 | End: 2022-07-14 | Stop reason: HOSPADM

## 2022-07-14 RX ORDER — FLUMAZENIL 0.1 MG/ML
0.2 INJECTION INTRAVENOUS AS NEEDED
Status: DISCONTINUED | OUTPATIENT
Start: 2022-07-14 | End: 2022-07-14 | Stop reason: HOSPADM

## 2022-07-14 RX ORDER — SODIUM CHLORIDE 0.9 % (FLUSH) 0.9 %
3-10 SYRINGE (ML) INJECTION AS NEEDED
Status: DISCONTINUED | OUTPATIENT
Start: 2022-07-14 | End: 2022-07-14 | Stop reason: HOSPADM

## 2022-07-14 RX ORDER — ERYTHROMYCIN 5 MG/G
OINTMENT OPHTHALMIC AS NEEDED
Status: DISCONTINUED | OUTPATIENT
Start: 2022-07-14 | End: 2022-07-14 | Stop reason: HOSPADM

## 2022-07-14 RX ORDER — HYDROCODONE BITARTRATE AND ACETAMINOPHEN 5; 325 MG/1; MG/1
1 TABLET ORAL ONCE AS NEEDED
Status: COMPLETED | OUTPATIENT
Start: 2022-07-14 | End: 2022-07-14

## 2022-07-14 RX ORDER — DEXAMETHASONE SODIUM PHOSPHATE 10 MG/ML
INJECTION INTRAMUSCULAR; INTRAVENOUS AS NEEDED
Status: DISCONTINUED | OUTPATIENT
Start: 2022-07-14 | End: 2022-07-14 | Stop reason: SURG

## 2022-07-14 RX ORDER — SODIUM CHLORIDE 0.9 % (FLUSH) 0.9 %
3 SYRINGE (ML) INJECTION EVERY 12 HOURS SCHEDULED
Status: DISCONTINUED | OUTPATIENT
Start: 2022-07-14 | End: 2022-07-14 | Stop reason: HOSPADM

## 2022-07-14 RX ORDER — LIDOCAINE HYDROCHLORIDE 10 MG/ML
0.5 INJECTION, SOLUTION EPIDURAL; INFILTRATION; INTRACAUDAL; PERINEURAL ONCE AS NEEDED
Status: DISCONTINUED | OUTPATIENT
Start: 2022-07-14 | End: 2022-07-14 | Stop reason: HOSPADM

## 2022-07-14 RX ORDER — EPHEDRINE SULFATE 50 MG/ML
5 INJECTION, SOLUTION INTRAVENOUS ONCE AS NEEDED
Status: DISCONTINUED | OUTPATIENT
Start: 2022-07-14 | End: 2022-07-14 | Stop reason: HOSPADM

## 2022-07-14 RX ORDER — MAGNESIUM HYDROXIDE 1200 MG/15ML
LIQUID ORAL AS NEEDED
Status: DISCONTINUED | OUTPATIENT
Start: 2022-07-14 | End: 2022-07-14 | Stop reason: HOSPADM

## 2022-07-14 RX ORDER — NALOXONE HCL 0.4 MG/ML
0.2 VIAL (ML) INJECTION AS NEEDED
Status: DISCONTINUED | OUTPATIENT
Start: 2022-07-14 | End: 2022-07-14 | Stop reason: HOSPADM

## 2022-07-14 RX ORDER — ERYTHROMYCIN 5 MG/G
OINTMENT OPHTHALMIC 2 TIMES DAILY
Qty: 3.5 G | Refills: 0 | Status: SHIPPED | OUTPATIENT
Start: 2022-07-14 | End: 2022-12-06

## 2022-07-14 RX ORDER — SODIUM CHLORIDE, SODIUM LACTATE, POTASSIUM CHLORIDE, CALCIUM CHLORIDE 600; 310; 30; 20 MG/100ML; MG/100ML; MG/100ML; MG/100ML
9 INJECTION, SOLUTION INTRAVENOUS CONTINUOUS
Status: DISCONTINUED | OUTPATIENT
Start: 2022-07-14 | End: 2022-07-14 | Stop reason: HOSPADM

## 2022-07-14 RX ORDER — PROPOFOL 10 MG/ML
VIAL (ML) INTRAVENOUS AS NEEDED
Status: DISCONTINUED | OUTPATIENT
Start: 2022-07-14 | End: 2022-07-14 | Stop reason: SURG

## 2022-07-14 RX ORDER — HYDRALAZINE HYDROCHLORIDE 20 MG/ML
5 INJECTION INTRAMUSCULAR; INTRAVENOUS
Status: DISCONTINUED | OUTPATIENT
Start: 2022-07-14 | End: 2022-07-14 | Stop reason: HOSPADM

## 2022-07-14 RX ADMIN — FENTANYL CITRATE 25 MCG: 50 INJECTION INTRAMUSCULAR; INTRAVENOUS at 15:09

## 2022-07-14 RX ADMIN — FAMOTIDINE 20 MG: 10 INJECTION, SOLUTION INTRAVENOUS at 10:28

## 2022-07-14 RX ADMIN — HYDROCODONE BITARTRATE AND ACETAMINOPHEN 1 TABLET: 5; 325 TABLET ORAL at 16:07

## 2022-07-14 RX ADMIN — LIDOCAINE HYDROCHLORIDE 100 MG: 20 INJECTION, SOLUTION INFILTRATION; PERINEURAL at 13:34

## 2022-07-14 RX ADMIN — DEXAMETHASONE SODIUM PHOSPHATE 8 MG: 10 INJECTION INTRAMUSCULAR; INTRAVENOUS at 13:38

## 2022-07-14 RX ADMIN — SODIUM CHLORIDE, POTASSIUM CHLORIDE, SODIUM LACTATE AND CALCIUM CHLORIDE 9 ML/HR: 600; 310; 30; 20 INJECTION, SOLUTION INTRAVENOUS at 10:28

## 2022-07-14 RX ADMIN — PROPOFOL 200 MG: 10 INJECTION, EMULSION INTRAVENOUS at 13:34

## 2022-07-14 NOTE — ANESTHESIA PROCEDURE NOTES
Airway  Urgency: elective    Date/Time: 7/14/2022 1:36 PM  Airway not difficult    General Information and Staff    Patient location during procedure: OR  Anesthesiologist: Hemal Redman MD  CRNA/CAA: Jaime Miguel CRNA    Indications and Patient Condition  Indications for airway management: airway protection    Preoxygenated: yes  Mask difficulty assessment: 1 - vent by mask    Final Airway Details  Final airway type: supraglottic airway      Successful airway: unique  Size 3    Number of attempts at approach: 1  Assessment: lips, teeth, and gum same as pre-op and atraumatic intubation    Additional Comments  Pre 02 100%, SIVI, atraumatic intubation, BLBS, Positive ETC02.

## 2022-07-14 NOTE — H&P
History & Physical       Patient: Anabell Richards    Date of Admission: 7/14/2022  8:42 AM    YOB: 1950    Medical Record Number: [id number]      Chief Complaints: Bilateral upper eyelid dermatochalasis, bilateral lower lid dermatochalasis      History of Present Illness: 72 y.o. female presents with as above. No new meds/health problems since office visit      Allergies:   Allergies   Allergen Reactions   • Sulfamethoxazole-Trimethoprim Rash       10 point review of systems negative, except pertaining to the HPI    Medications:   Home Medications:  No current facility-administered medications on file prior to encounter.     Current Outpatient Medications on File Prior to Encounter   Medication Sig   • aspirin 81 MG EC tablet Take 81 mg by mouth Daily. HELD FOR SURGERY PER MD INSTRUCTIONS   • Bempedoic Acid-Ezetimibe (Nexlizet) 180-10 MG tablet Take 1 tablet by mouth Daily.   • carvedilol (COREG) 3.125 MG tablet Take 1 tablet by mouth 2 (Two) Times a Day.   • coenzyme Q10 100 MG capsule Take 200 mg by mouth Daily. HELD FOR SURGERY   • lisinopril (PRINIVIL,ZESTRIL) 5 MG tablet Take 1 tablet by mouth Daily.   • multivitamin with minerals tablet tablet Take 1 tablet by mouth Daily. HELD FOR SURGERY   • tretinoin (RETIN-A) 0.025 % cream Retin-A 0.025 % topical cream apply to the affected area(s) by topical route once daily at bedtime   Active     Current Medications:  Scheduled Meds:famotidine, 20 mg, Intravenous, Once  sodium chloride, 3 mL, Intravenous, Q12H      Continuous Infusions:lactated ringers, 9 mL/hr      PRN Meds:.fentanyl  •  lidocaine PF 1%  •  sodium chloride    Past Medical History:   Diagnosis Date   • Allergies    • Coronary artery disease involving native heart without angina pectoris 05/25/2021    No significant occlusive coronary disease.  Mild atherosclerotic narrowing seen in multiple vessels in multiple locations on a cath May 25, 2021   • Droopy eyelid, bilateral    •  Hyperlipidemia    • Hypertension    • Irritation of right eye 07/08/2022    INSTRUCTED TO NOTIFY SURGEON ASAP   • Mitral valve disease 11/10/2021     Mild mitral valve prolapse with mild mitral valve regurgitation.  On echo August 1, 2019   • Sjogren's syndrome (HCC)         Past Surgical History:   Procedure Laterality Date   • ABDOMINAL SURGERY      URETHAL SLING   • CATARACT EXTRACTION W/ INTRAOCULAR LENS  IMPLANT, BILATERAL Bilateral    • DILATATION AND CURETTAGE          Social History     Occupational History   • Not on file   Tobacco Use   • Smoking status: Never Smoker   • Smokeless tobacco: Never Used   Vaping Use   • Vaping Use: Never used   Substance and Sexual Activity   • Alcohol use: Never   • Drug use: Never   • Sexual activity: Never      Social History     Social History Narrative   • Not on file        Family History   Problem Relation Age of Onset   • Stroke Other         NOT SPECIFIED   • Heart disease Other         NOT SPECIFIED   • Cancer Other         NOT SPECIFIED   • Cancer Other         NOT SPECIFIED   • Malig Hyperthermia Neg Hx            Physical Exam   Constitutional: Alert, cooperative, in no acute distress    Head: Normocephalic.   Eyes:    Bilateral upper and lower lid dermatochalasis  Neck: Normal range of motion.   Cardiovascular: Normal rate.    Pulmonary/Chest: Effort normal.   Neurological: Alert.   Skin: Skin is warm.   Psychiatric: Normal mood and affect.       Assessment/Plan:  The patient voiced understanding of the risks, benefits, and alternative forms of treatment that were discussed and the patient consents to proceed with bilateral upper eyelid blepharoplasty, bilateral lower lid blepharoplasty.    Twan Todd MD

## 2022-07-14 NOTE — ANESTHESIA POSTPROCEDURE EVALUATION
Patient: Anabell Richards    Procedure Summary     Date: 07/14/22 Room / Location: [facility] OR  / [facility] MAIN OR    Anesthesia Start: 1328 Anesthesia Stop: 1546    Procedures:       BILATERAL LOWER LID BLEPHAROPLASTY (Bilateral Eye)      BILATERAL UPPER LID BLEPHAROPLASTY (Bilateral Eye) Diagnosis:     Surgeons: Twan Todd MD Provider: Hemal Redman MD    Anesthesia Type: MAC ASA Status: 3          Anesthesia Type: MAC    Vitals  Vitals Value Taken Time   /89 07/14/22 1611   Temp 37.1 °C (98.7 °F) 07/14/22 1610   Pulse 65 07/14/22 1617   Resp 16 07/14/22 1610   SpO2 96 % 07/14/22 1617   Vitals shown include unvalidated device data.        Post Anesthesia Care and Evaluation    Patient location during evaluation: PACU  Patient participation: complete - patient participated  Level of consciousness: awake and alert  Pain management: adequate    Airway patency: patent  Anesthetic complications: No anesthetic complications    Cardiovascular status: acceptable  Respiratory status: acceptable  Hydration status: acceptable    Comments: ---------------------------               07/14/22                      1610         ---------------------------   BP:          164/89         Pulse:         69           Resp:          16           Temp:   37.1 °C (98.7 °F)   SpO2:          97%         ---------------------------

## 2022-07-14 NOTE — OP NOTE
OPERATIVE NOTE    Patient Identification:  Name: Anabell Richards  Age: 72 y.o.  Sex: female  :  1950  MRN: [id number]                                               Preoperative diagnosis:   1. Bilateral upper eyelid dermatochalasis  2. Bilateral lower eyelid dermatochalasis  Postoperative diagnosis: same  Procedure:   1. Bilateral upper eyelid blepharoplasty  2. Bilateral lower eyelid blepharoplasty  Surgeon: Twan Todd MD who was present and scrubbed throughout all critical portions of the operation  Assistants: Ronald Parker MD  Anesthesia: General  EBL: less than 50cc  Specimens:  * No orders in the log *    Description of the procedure: The patient was taken to the operating room and placed on the table in the supine position, where anesthesia was induced. 2% lidocaine with epinephrine and 0.5% marcaine in a 1:1 fashion was injected over the surgical site, and the patient was prepped and draped in the usual manner for orbitofacial surgery.     Corneal protectors were placed in both eyes.    A 15 Bard-Domingo blade incision was made 8 mm from the eyelash margin, across the entire horizontal extent of the left upper eyelid. A second incision was made 10 mm inferior to the junction of the brow and eyelid, and a pinch test was used to ensure the amount of skin excision was appropriate. The intervening tissue was excised with a 15 Bard-Domingo blade and Tommy scissors. Excessive orbicularis tissue was removed. The orbital septum was opened horizontally, and excessive fatty tissue was also removed. Bleeding was controlled with electrocauterization. The skin was then closed with 5-0 fast absorbing suture in an interrupted and running fashion, with care to incorporate the prestarsal orbicularis over the pupil, nasal and temporal limbi respectively. The lid position and contour were examined and found to be satisfactory.     The exact same procedure was preformed over the contralateral upper lid.      Attention was then turned to the lower eyelids.  A 15 Bard-Domingo blade incision was made at the right lateral canthus. Sharp dissection was carried down to the lateral orbital rim periosteum. The inferior ramus of the lateral canthal tendon was identified and severed with sharp dissection. A dull tip Tommy scissors were used to incise the conjunctiva and lower lid retractors across the entire horizontal aspect of the lid.  A 5-0 Vicryl stay suture was placed in the posterior cut edge of conjunctiva. A sharp dissection plane was carried out below the orbicularis muscle layer until the entire lower lid was undermined. The orbital septum was opened horizontally, and herniated fatty tissue was removed. The remaining fatty tissue was recontoured to provide the optimal eyelid configuration. Bleeding was controlled with electrocauterization.  The conjunctiva was closed with interrupted 5-0 fast absorbing gut suture.  The lateral canthal tendon was re-suspended with 5-0 vicryl suture anchored to the lateral orbital rim periosteum. The skin was closed with 5-0 fast absorbing suture.      The exact same procedure was performed on the contralateral lower lid.     The corneal protectors were removed and antibiotic ophthalmic ointment was placed over the surgical site.     The patient was then awakened and taken from the operating room in good condition, having tolerated the procedure well. There were no complications, and the estimated blood loss was less than 50 cc.

## 2022-07-14 NOTE — DISCHARGE INSTRUCTIONS
POST OPERATIVE INSTRUCTIONS  EYELID SURGERY      Patient Name:   Date of Birth:    Most procedures are performed with local anesthesia with intravenous sedation. While post-operative nausea is rare with this type of anesthesia, it is wise to start your diet by drinking clear liquids after surgery (e.g., 7-Up, Gatorade, ginger ale, etc.).  If clear liquids are tolerated well without nausea or vomiting, you may advance towards a regular diet.  Avoid “fatty foods” (eg, milk, pizza, hamburgers, etc.) on the day of surgery or as long as nausea persists.    Many eyelid procedures only require Extra Strength Tylenol for postoperative pain control.  For those patients with more extensive eyelid reconstruction, a prescription for a pain reliever is often given.  Patients may choose to take the prescribed pain reliever OR Extra Strength Tylenol, BUT NOT both together.  Unless specifically instructed, aspirin products such as Yanna, Bufferin, Anacin, Excedrin, etc., should be avoided for at least one week after surgery.  This also includes Advil, Nuprin, Motrin and Ibuprofen.  Any other medications (except blood thinners), which you were taking prior to surgery, should be continued on their regular schedule.  Whenever lying down or sleeping, keep your head elevated on 2 or 3 pillows such that your head is always elevated above the level of your chest.    Apply cold compress to surgical site as much as possible for 2 to3 days following surgery.  A folded washcloth soaked in ice-cold water and wrung out works well for this.  A bag of frozen peas also works well as it is lightweight but molds to the eye.  Do not apply ice directly to the skin.  A small tube of eye ointment should be provided after surgery.  This is to be gently applied to the stitches twice daily.  If the eyes feel irritated, the ointment is safe to use in the eye for lubrication.  This will likely result in blurred vision but will go away once the ointment is  stopped.  EXCEPTION:  If a patch is taped over the eye, do NOT apply cold compresses or ointment.  Leave the patch undisturbed.  A physician will remove the patch at your first post-operative visit.  If your surgery was done on an outpatient basis, you should receive a phone call the day after surgery to check on your progress and to arrange for a post-operative clinic appointment.  The first post-operative visit will be one to two weeks after surgery to check the incisions and remove sutures if necessary.  A second post-operative visit six to eight weeks after surgery will assess the final surgical result.  The following problems should be reported to the office as soon as possible:  Continuous, brisk bleeding.  Please note that some oozing or drainage is common following a surgical procedure.  Do not try to clean dried blood from the surgical site.   This will likely only create more bleeding.  Temperature (fever) over 101?F.  Excessive pain at surgical site not relieved by the pain medication, especially if associated with protrusion of the eyeball.  Sudden loss of vision.  Please note that some blurriness is expected after surgery due to the ointment used around the eyes.  All patients should be able to check their vision even with eyelid swelling.  Double vision      If a problem should arise or you have a question, I can be reached at any time of the day or week by calling [phone].  I hope that your surgery experience with us is a positive one.  Please contact my office with any questions.  Sincerely,  Sean Lieberman MD, MD Renato Manuel MD

## 2022-07-14 NOTE — ANESTHESIA PREPROCEDURE EVALUATION
Anesthesia Evaluation     Patient summary reviewed and Nursing notes reviewed   no history of anesthetic complications:               Airway   Mallampati: II  TM distance: >3 FB  Neck ROM: full  no difficulty expected  Dental - normal exam     Pulmonary     breath sounds clear to auscultation  (+) shortness of breath,   (-) sleep apnea, decreased breath sounds, wheezes  Cardiovascular - normal exam  Exercise tolerance: good (4-7 METS)    Rhythm: regular  Rate: normal    (+) hypertension, valvular problems/murmurs MVP, CAD, [place], hyperlipidemia,   (-) past MI, angina, CHF, orthopnea, PND, PVD      Neuro/Psych- negative ROS  (-) seizures, neuromuscular disease, TIA, CVA, dizziness/light headedness, weakness, numbness  GI/Hepatic/Renal/Endo - negative ROS   (-) liver disease, diabetes    Musculoskeletal (-) negative ROS    Abdominal  - normal exam   Substance History - negative use  (-) alcohol use, drug use     OB/GYN negative ob/gyn ROS         Other - negative ROS                       Anesthesia Plan    ASA 3     MAC     (D/W pt. MAC and possible awareness intra op.  Pt understands MAC and GA are not the same and the possibility of GA being required for failed MAC)  intravenous induction     Anesthetic plan, risks, benefits, and alternatives have been provided, discussed and informed consent has been obtained with: patient.        CODE STATUS:

## 2022-08-05 ENCOUNTER — TELEPHONE (OUTPATIENT)
Dept: CARDIOLOGY | Facility: CLINIC | Age: 72
End: 2022-08-05

## 2022-08-12 ENCOUNTER — TELEPHONE (OUTPATIENT)
Dept: ONCOLOGY | Facility: OTHER | Age: 72
End: 2022-08-12

## 2022-08-17 ENCOUNTER — TELEPHONE (OUTPATIENT)
Dept: CARDIOLOGY | Facility: CLINIC | Age: 72
End: 2022-08-17

## 2022-08-17 NOTE — TELEPHONE ENCOUNTER
Please let her know that it works without having to have statins on board.  It works by itself does not need statin to be effective

## 2022-08-18 NOTE — TELEPHONE ENCOUNTER
Spoke with patient and informed her of Dr. Valle's instructions.  Patient understood. No further questions at this time.

## 2022-09-06 ENCOUNTER — HOSPITAL ENCOUNTER (OUTPATIENT)
Dept: INFUSION THERAPY | Facility: HOSPITAL | Age: 72
Discharge: HOME | End: 2022-09-06
Admitting: INTERNAL MEDICINE

## 2022-09-06 VITALS
SYSTOLIC BLOOD PRESSURE: 141 MMHG | OXYGEN SATURATION: 99 % | WEIGHT: 153.44 LBS | HEIGHT: 66 IN | BODY MASS INDEX: 24.66 KG/M2 | DIASTOLIC BLOOD PRESSURE: 60 MMHG | TEMPERATURE: 97.9 F | HEART RATE: 66 BPM | RESPIRATION RATE: 20 BRPM

## 2022-09-06 DIAGNOSIS — I25.10 CORONARY ARTERY DISEASE INVOLVING NATIVE HEART WITHOUT ANGINA PECTORIS, UNSPECIFIED VESSEL OR LESION TYPE: ICD-10-CM

## 2022-09-06 DIAGNOSIS — E78.00 PURE HYPERCHOLESTEROLEMIA: Primary | ICD-10-CM

## 2022-09-06 PROCEDURE — 96372 THER/PROPH/DIAG INJ SC/IM: CPT

## 2022-09-06 PROCEDURE — 25010000002 INCLISIRAN SODIUM 284 MG/1.5ML SOLUTION PREFILLED SYRINGE: Performed by: INTERNAL MEDICINE

## 2022-09-06 RX ADMIN — INCLISIRAN 284 MG: 284 INJECTION, SOLUTION SUBCUTANEOUS at 11:12

## 2022-11-04 ENCOUNTER — LAB (OUTPATIENT)
Dept: LAB | Facility: HOSPITAL | Age: 72
End: 2022-11-04

## 2022-11-04 DIAGNOSIS — I25.10 CORONARY ARTERY DISEASE INVOLVING NATIVE HEART WITHOUT ANGINA PECTORIS, UNSPECIFIED VESSEL OR LESION TYPE: Chronic | ICD-10-CM

## 2022-11-04 DIAGNOSIS — I05.9 MITRAL VALVE DISEASE: Chronic | ICD-10-CM

## 2022-11-04 DIAGNOSIS — E78.5 HYPERLIPIDEMIA, UNSPECIFIED HYPERLIPIDEMIA TYPE: ICD-10-CM

## 2022-11-04 DIAGNOSIS — I10 PRIMARY HYPERTENSION: ICD-10-CM

## 2022-11-04 LAB
ALBUMIN SERPL-MCNC: 4.3 G/DL (ref 3.5–5.2)
ALBUMIN/GLOB SERPL: 1.3 G/DL
ALP SERPL-CCNC: 44 U/L (ref 39–117)
ALT SERPL W P-5'-P-CCNC: 16 U/L (ref 1–33)
ANION GAP SERPL CALCULATED.3IONS-SCNC: 7.6 MMOL/L (ref 5–15)
AST SERPL-CCNC: 22 U/L (ref 1–32)
BILIRUB SERPL-MCNC: 0.3 MG/DL (ref 0–1.2)
BUN SERPL-MCNC: 23 MG/DL (ref 8–23)
BUN/CREAT SERPL: 29.9 (ref 7–25)
CALCIUM SPEC-SCNC: 9.6 MG/DL (ref 8.6–10.5)
CHLORIDE SERPL-SCNC: 105 MMOL/L (ref 98–107)
CHOLEST SERPL-MCNC: 114 MG/DL (ref 0–200)
CO2 SERPL-SCNC: 26.4 MMOL/L (ref 22–29)
CREAT SERPL-MCNC: 0.77 MG/DL (ref 0.57–1)
EGFRCR SERPLBLD CKD-EPI 2021: 83.1 ML/MIN/1.73
GLOBULIN UR ELPH-MCNC: 3.3 GM/DL
GLUCOSE SERPL-MCNC: 88 MG/DL (ref 65–99)
HDLC SERPL-MCNC: 57 MG/DL (ref 40–60)
LDLC SERPL CALC-MCNC: 46 MG/DL (ref 0–100)
LDLC/HDLC SERPL: 0.85 {RATIO}
MAGNESIUM SERPL-MCNC: 2 MG/DL (ref 1.6–2.4)
POTASSIUM SERPL-SCNC: 4.7 MMOL/L (ref 3.5–5.2)
PROT SERPL-MCNC: 7.6 G/DL (ref 6–8.5)
SODIUM SERPL-SCNC: 139 MMOL/L (ref 136–145)
TRIGL SERPL-MCNC: 43 MG/DL (ref 0–150)
VLDLC SERPL-MCNC: 11 MG/DL (ref 5–40)

## 2022-11-04 PROCEDURE — 80061 LIPID PANEL: CPT

## 2022-11-04 PROCEDURE — 80053 COMPREHEN METABOLIC PANEL: CPT

## 2022-11-04 PROCEDURE — 36415 COLL VENOUS BLD VENIPUNCTURE: CPT

## 2022-11-04 PROCEDURE — 83735 ASSAY OF MAGNESIUM: CPT

## 2022-11-08 ENCOUNTER — TELEPHONE (OUTPATIENT)
Dept: CARDIOLOGY | Facility: CLINIC | Age: 72
End: 2022-11-08

## 2022-11-08 DIAGNOSIS — E78.5 HYPERLIPIDEMIA, UNSPECIFIED HYPERLIPIDEMIA TYPE: Primary | ICD-10-CM

## 2022-11-08 NOTE — TELEPHONE ENCOUNTER
SW patient. Patient wanting to stop her Nexlizet and only take her leqvio injection.     Please advise

## 2022-12-06 ENCOUNTER — HOSPITAL ENCOUNTER (OUTPATIENT)
Dept: INFUSION THERAPY | Facility: HOSPITAL | Age: 72
Discharge: HOME | End: 2022-12-06
Admitting: INTERNAL MEDICINE

## 2022-12-06 VITALS
SYSTOLIC BLOOD PRESSURE: 142 MMHG | DIASTOLIC BLOOD PRESSURE: 60 MMHG | HEART RATE: 63 BPM | WEIGHT: 157.63 LBS | TEMPERATURE: 97.7 F | RESPIRATION RATE: 20 BRPM | HEIGHT: 66 IN | OXYGEN SATURATION: 100 % | BODY MASS INDEX: 25.33 KG/M2

## 2022-12-06 DIAGNOSIS — I25.10 CORONARY ARTERY DISEASE INVOLVING NATIVE HEART WITHOUT ANGINA PECTORIS, UNSPECIFIED VESSEL OR LESION TYPE: ICD-10-CM

## 2022-12-06 DIAGNOSIS — E78.00 PURE HYPERCHOLESTEROLEMIA: Primary | ICD-10-CM

## 2022-12-06 PROCEDURE — 96372 THER/PROPH/DIAG INJ SC/IM: CPT

## 2022-12-06 PROCEDURE — 25010000002 INCLISIRAN SODIUM 284 MG/1.5ML SOLUTION PREFILLED SYRINGE: Performed by: INTERNAL MEDICINE

## 2022-12-06 RX ORDER — ASPIRIN 81 MG/1
81 TABLET, CHEWABLE ORAL DAILY
COMMUNITY

## 2022-12-06 RX ORDER — MULTIPLE VITAMINS W/ MINERALS TAB 9MG-400MCG
1 TAB ORAL DAILY
COMMUNITY

## 2022-12-06 RX ADMIN — INCLISIRAN 284 MG: 284 INJECTION, SOLUTION SUBCUTANEOUS at 14:22

## 2023-01-13 ENCOUNTER — APPOINTMENT (OUTPATIENT)
Dept: MAMMOGRAPHY | Facility: HOSPITAL | Age: 73
End: 2023-01-13
Payer: MEDICARE

## 2023-02-28 ENCOUNTER — LAB (OUTPATIENT)
Dept: LAB | Facility: HOSPITAL | Age: 73
End: 2023-02-28
Payer: MEDICARE

## 2023-02-28 DIAGNOSIS — E78.5 HYPERLIPIDEMIA, UNSPECIFIED HYPERLIPIDEMIA TYPE: ICD-10-CM

## 2023-02-28 PROCEDURE — 80061 LIPID PANEL: CPT

## 2023-02-28 PROCEDURE — 80076 HEPATIC FUNCTION PANEL: CPT

## 2023-02-28 PROCEDURE — 36415 COLL VENOUS BLD VENIPUNCTURE: CPT

## 2023-03-01 ENCOUNTER — OFFICE VISIT (OUTPATIENT)
Dept: CARDIOLOGY | Facility: CLINIC | Age: 73
End: 2023-03-01
Payer: MEDICARE

## 2023-03-01 VITALS
BODY MASS INDEX: 25.26 KG/M2 | HEART RATE: 58 BPM | DIASTOLIC BLOOD PRESSURE: 66 MMHG | SYSTOLIC BLOOD PRESSURE: 144 MMHG | HEIGHT: 66 IN | WEIGHT: 157.2 LBS

## 2023-03-01 DIAGNOSIS — E78.2 HYPERLIPEMIA, MIXED: Primary | ICD-10-CM

## 2023-03-01 DIAGNOSIS — I25.10 CORONARY ARTERY DISEASE INVOLVING NATIVE CORONARY ARTERY OF NATIVE HEART WITHOUT ANGINA PECTORIS: ICD-10-CM

## 2023-03-01 DIAGNOSIS — I10 HYPERTENSION, ESSENTIAL: ICD-10-CM

## 2023-03-01 LAB
ALBUMIN SERPL-MCNC: 4.4 G/DL (ref 3.5–5.2)
ALP SERPL-CCNC: 55 U/L (ref 39–117)
ALT SERPL W P-5'-P-CCNC: 12 U/L (ref 1–33)
AST SERPL-CCNC: 17 U/L (ref 1–32)
BILIRUB CONJ SERPL-MCNC: <0.2 MG/DL (ref 0–0.3)
BILIRUB INDIRECT SERPL-MCNC: NORMAL MG/DL
BILIRUB SERPL-MCNC: 0.3 MG/DL (ref 0–1.2)
CHOLEST SERPL-MCNC: 226 MG/DL (ref 0–200)
HDLC SERPL-MCNC: 58 MG/DL (ref 40–60)
LDLC SERPL CALC-MCNC: 149 MG/DL (ref 0–100)
LDLC/HDLC SERPL: 2.52 {RATIO}
PROT SERPL-MCNC: 7.2 G/DL (ref 6–8.5)
TRIGL SERPL-MCNC: 109 MG/DL (ref 0–150)
VLDLC SERPL-MCNC: 19 MG/DL (ref 5–40)

## 2023-03-01 PROCEDURE — 99214 OFFICE O/P EST MOD 30 MIN: CPT | Performed by: SPECIALIST

## 2023-03-01 RX ORDER — EZETIMIBE 10 MG/1
10 TABLET ORAL DAILY
Qty: 90 TABLET | Refills: 3 | Status: SHIPPED | OUTPATIENT
Start: 2023-03-01

## 2023-03-01 NOTE — PROGRESS NOTES
[facility]  Cardiology progress Note    Patient Name: Anabell Richards  : 1950    CHIEF COMPLAINT  Hypertension        Subjective   Subjective     HISTORY OF PRESENT ILLNESS    Anabell Richards is a 72 y.o. female with history of hypertension hyperlipidemia.  No chest pain or shortness of breath    REVIEW OF SYSTEMS    Constitutional:    No fever, no weight loss  Skin:     No rash  Otolaryngeal:    No difficulty swallowing  Cardiovascular: See HPI.  Pulmonary:    No cough, no sputum production    Personal History     Social History:    reports that she has never smoked. She has never used smokeless tobacco. She reports that she does not drink alcohol and does not use drugs.    Home Medications:  Current Outpatient Medications on File Prior to Visit   Medication Sig   • aspirin 81 MG chewable tablet Chew 1 tablet Daily.   • carvedilol (COREG) 3.125 MG tablet Take 1 tablet by mouth 2 (Two) Times a Day.   • INCLISIRAN SODIUM SC Inject  under the skin into the appropriate area as directed.   • lisinopril (PRINIVIL,ZESTRIL) 5 MG tablet Take 1 tablet by mouth Daily.   • multivitamin with minerals tablet tablet Take 1 tablet by mouth Daily.     No current facility-administered medications on file prior to visit.       Past Medical History:   Diagnosis Date   • Allergies    • Coronary artery disease involving native heart without angina pectoris 2021    No significant occlusive coronary disease.  Mild atherosclerotic narrowing seen in multiple vessels in multiple locations on a cath May 25, 2021   • Droopy eyelid, bilateral    • Hyperlipidemia    • Hypertension    • Irritation of right eye 2022    INSTRUCTED TO NOTIFY SURGEON ASAP   • Mitral valve disease 11/10/2021     Mild mitral valve prolapse with mild mitral valve regurgitation.  On echo 2019   • Sjogren's syndrome (HCC)        Allergies:  Allergies   Allergen Reactions   • Sulfamethoxazole-Trimethoprim Rash       Objective     Objective       Vitals:   Heart Rate:  [58] 58  BP: (144)/(66) 144/66  Body mass index is 25.37 kg/m².     PHYSICAL EXAM:    General Appearance:   · well developed  · well nourished  HENT:   · oropharynx moist  · lips not cyanotic  Neck:  · thyroid not enlarged  · supple  Respiratory:  · no respiratory distress  · normal breath sounds  · no rales  Cardiovascular:  · no jugular venous distention  · regular rhythm  · apical impulse normal  · S1 normal, S2 normal  · no S3, no S4   · no murmur  · no rub, no thrill  · carotid pulses normal; no bruit  · pedal pulses normal  · lower extremity edema: none    Skin:   · warm, dry  Psychiatric:  · judgement and insight appropriate  · normal mood and affect        Result Review:  I have personally reviewed the available results from  [x]  Laboratory  [x]  EKG  [x]  Cardiology  [x]  Medications  [x]  Old records  []  Other:     Procedures  Lab Results   Component Value Date    CHOL 226 (H) 02/28/2023    CHOL 114 11/04/2022    CHOL 175 06/02/2022     Lab Results   Component Value Date    TRIG 109 02/28/2023    TRIG 43 11/04/2022    TRIG 135 06/02/2022     Lab Results   Component Value Date    HDL 58 02/28/2023    HDL 57 11/04/2022    HDL 44 06/02/2022     Lab Results   Component Value Date     (H) 02/28/2023    LDL 46 11/04/2022     (H) 06/02/2022     Lab Results   Component Value Date    VLDL 19 02/28/2023    VLDL 11 11/04/2022    VLDL 24 06/02/2022        Impression/Plan:  1.  Hyperlipidemia: Cannot tolerate statins.  Continue inclisiran once every 6 months.  Add Zetia 10 mg once a day.  Monitor lipid and hepatic profile.  2.  Stable coronary disease: Continue aspirin 81 mg once a day.  No chest pain.  3.  Essential hypertension controlled: Continue Zestril 5 mg once a day.  Continue carvedilol 3.125 mg twice a day           Rashi Corona MD   03/01/23   12:16 EST

## 2023-03-27 ENCOUNTER — HOSPITAL ENCOUNTER (OUTPATIENT)
Dept: MAMMOGRAPHY | Facility: HOSPITAL | Age: 73
Discharge: HOME | End: 2023-03-27
Admitting: NURSE PRACTITIONER
Payer: MEDICARE

## 2023-03-27 DIAGNOSIS — Z12.31 ENCOUNTER FOR SCREENING MAMMOGRAM FOR MALIGNANT NEOPLASM OF BREAST: ICD-10-CM

## 2023-03-27 PROCEDURE — 77067 SCR MAMMO BI INCL CAD: CPT

## 2023-03-27 PROCEDURE — 77063 BREAST TOMOSYNTHESIS BI: CPT

## 2023-04-03 ENCOUNTER — HOSPITAL ENCOUNTER (OUTPATIENT)
Dept: CT IMAGING | Facility: HOSPITAL | Age: 73
Discharge: HOME | End: 2023-04-03
Admitting: NURSE PRACTITIONER
Payer: MEDICARE

## 2023-04-03 DIAGNOSIS — M35.00 SJOGREN'S SYNDROME, WITH UNSPECIFIED ORGAN INVOLVEMENT: ICD-10-CM

## 2023-04-03 DIAGNOSIS — R91.1 PULMONARY NODULE: ICD-10-CM

## 2023-04-03 PROCEDURE — 71250 CT THORAX DX C-: CPT

## 2023-04-16 NOTE — PROGRESS NOTES
Primary Care Provider  Sally Sandhu APRN   Referring Provider  No ref. provider found      Patient Complaint  Sjogren's syndrome (HCC), Follow-up (1 Year ), and Results (Chest CT 4/3/23)      Subjective          Anabell Richards presents to [facility] PULMONARY & CRITICAL CARE MEDICINE      History of Presenting Illness  Anabell Richards is a 73 y.o. female patient of Dr. Christiansen with history of pulmonary nodule, Sjogren's syndrome, dyspnea on exertion, here for 1 year follow-up.    Patient states she is doing well since her last visit.  Patient denies using any antibiotics or steroids for her lungs, denies any fevers or chills.  From a breathing standpoint, she has no complaints.  She has not experienced shortness of breath since stopping her statin medication, she really appreciated Dr. Christiansen's suggestion to discontinue statin as it has resulted in complete resolution of her breathing issues.  Patient has a lung nodule that we are monitoring annually.  She does not use any inhalers or other respiratory medications.  She is a never smoker.  Patient denies any productive cough, hemoptysis, swollen lymph nodes, weight loss, or night sweats.  She has had a sleep study in the past which showed ROBERT, wore CPAP for a couple of years but never felt like her sleep was good quality when wearing it.  She has since stopped wearing her CPAP and is sleeping much better, is interested in custom dental device for ROBERT.  She has also lost 20 lbs since her last visit and not sure if she still needs CPAP.  Patient sees Dr. Valle's office for management of mitral valve disease, CAD, and hypertension.  Overall, patient is doing well and has no additional concerns at this time.  Patient is able to perform ADLs without difficulty.  I have personally reviewed the review of systems, past family, social, medical and surgical histories; and agree with their findings.      Review of Systems    Review of Systems    Constitutional: Negative for activity change, chills, fatigue, fever, unexpected weight gain and unexpected weight loss.   HENT: Negative for congestion, ear discharge, ear pain, mouth sores, postnasal drip, rhinorrhea, sinus pressure, sore throat, swollen glands and trouble swallowing.    Eyes: Negative for blurred vision, pain, discharge, itching and visual disturbance.   Respiratory: Negative for apnea, cough, chest tightness, shortness of breath, wheezing and stridor.    Cardiovascular: Negative for chest pain, palpitations and leg swelling.   Gastrointestinal: Negative for abdominal distention, abdominal pain, constipation, diarrhea, nausea, vomiting, GERD and indigestion.   Musculoskeletal: Negative for arthralgias, joint swelling and myalgias.   Skin: Negative for color change.   Neurological: Negative for dizziness, weakness, light-headedness and headache.      Sleep: Negative for Excessive daytime sleepiness  Negative for morning headaches  Negative for Snoring      Family History   Problem Relation Age of Onset   • Stroke Other         NOT SPECIFIED   • Heart disease Other         NOT SPECIFIED   • Cancer Other         NOT SPECIFIED   • Cancer Other         NOT SPECIFIED   • Malig Hyperthermia Neg Hx         Social History     Socioeconomic History   • Marital status:    Tobacco Use   • Smoking status: Never     Passive exposure: Past   • Smokeless tobacco: Never   Vaping Use   • Vaping Use: Never used   Substance and Sexual Activity   • Alcohol use: Never   • Drug use: Never   • Sexual activity: Never        Past Medical History:   Diagnosis Date   • Allergies    • Coronary artery disease involving native heart without angina pectoris 05/25/2021    No significant occlusive coronary disease.  Mild atherosclerotic narrowing seen in multiple vessels in multiple locations on a cath May 25, 2021   • Droopy eyelid, bilateral    • Hyperlipidemia    • Hypertension    • Irritation of right eye 07/08/2022  "   INSTRUCTED TO NOTIFY SURGEON ASAP   • Mitral valve disease 11/10/2021     Mild mitral valve prolapse with mild mitral valve regurgitation.  On echo August 1, 2019   • Sjogren's syndrome         Immunization History   Administered Date(s) Administered   • COVID-19 (MODERNA) 1st, 2nd, 3rd Dose Only 02/23/2021, 03/23/2021, 10/27/2021, 04/04/2022   • COVID-19 (MODERNA) BIVALENT BOOSTER 12+YRS 09/12/2022   • Fluad Quad 65+ 09/12/2022   • Fluzone High Dose =>65 Years ([employer] ONLY) 09/22/2020   • Fluzone High-Dose 65+yrs 10/06/2021   • Pneumococcal Conjugate 20-Valent (PCV20) 06/02/2022   • Pneumococcal Polysaccharide (PPSV23) 10/07/2020   • Shingrix 10/07/2020, 01/22/2022, 03/22/2022       Allergies   Allergen Reactions   • Sulfamethoxazole-Trimethoprim Rash          Current Outpatient Medications:   •  aspirin 81 MG chewable tablet, Chew 1 tablet Daily., Disp: , Rfl:   •  carvedilol (COREG) 3.125 MG tablet, Take 1 tablet by mouth 2 (Two) Times a Day., Disp: 180 tablet, Rfl: 3  •  coenzyme Q10 100 MG capsule, Take 1 capsule by mouth Daily., Disp: , Rfl:   •  Collagen-Vitamin C 1000-10 MG tablet, Take  by mouth., Disp: , Rfl:   •  ezetimibe (ZETIA) 10 MG tablet, Take 1 tablet by mouth Daily., Disp: 90 tablet, Rfl: 3  •  INCLISIRAN SODIUM SC, Inject  under the skin into the appropriate area as directed., Disp: , Rfl:   •  lisinopril (PRINIVIL,ZESTRIL) 5 MG tablet, Take 1 tablet by mouth Daily., Disp: 90 tablet, Rfl: 3  •  multivitamin with minerals tablet tablet, Take 1 tablet by mouth Daily., Disp: , Rfl:   •  vitamin B-12 (CYANOCOBALAMIN) 1000 MCG tablet, Take 1 tablet by mouth Daily., Disp: , Rfl:      Objective     Vital Signs:   /64 (BP Location: Left arm, Patient Position: Sitting, Cuff Size: Adult)   Pulse 58   Resp 16   Ht 167.6 cm (66\")   Wt 70.3 kg (155 lb)   SpO2 99% Comment: Room air. Has CPAP @ night  BMI 25.02 kg/m²     Objective   Physical Exam  Constitutional:       General: She is not in " acute distress.     Appearance: Normal appearance. She is normal weight. She is not ill-appearing.   HENT:      Right Ear: Tympanic membrane and ear canal normal.      Left Ear: Tympanic membrane and ear canal normal.      Nose: Nose normal.      Mouth/Throat:      Mouth: Mucous membranes are moist.      Pharynx: Oropharynx is clear.   Eyes:      Extraocular Movements: Extraocular movements intact.      Conjunctiva/sclera: Conjunctivae normal.      Pupils: Pupils are equal, round, and reactive to light.   Cardiovascular:      Rate and Rhythm: Normal rate and regular rhythm.      Pulses: Normal pulses.      Heart sounds: Normal heart sounds.   Pulmonary:      Effort: Pulmonary effort is normal. No respiratory distress.      Breath sounds: Normal breath sounds. No stridor. No wheezing, rhonchi or rales.   Abdominal:      General: Bowel sounds are normal.      Palpations: Abdomen is soft.   Musculoskeletal:         General: No swelling. Normal range of motion.      Cervical back: Normal range of motion and neck supple.      Right lower leg: No edema.      Left lower leg: No edema.   Skin:     General: Skin is warm and dry.   Neurological:      General: No focal deficit present.      Mental Status: She is alert and oriented to person, place, and time.      Motor: No weakness.   Psychiatric:         Mood and Affect: Mood normal.         Behavior: Behavior normal.       Result Review :   I have personally reviewed patient's labs and images.  I also reviewed Ramya's last office note 4/27/2022.            Diagnoses and all orders for this visit:    1. Pulmonary nodule (Primary)    2. Dyspnea on exertion    3. ROBERT (obstructive sleep apnea)    4. Sjogren's syndrome, with unspecified organ involvement    5. Anemia, chronic disease       Impression and Plan    -CT chest without contrast 4/3/2023 showed no new or suspicious nodules.  Spoke with Dr. Puente with radiology who confirmed that the 5 mm RLL nodule is still present  but very faint.  These results were discussed with patient at today's visit.  We will continue with annual chest CTs, next due April 2024, will order today.  -No need for maintenance inhalers at this time  -Recommend that patient wear CPAP nightly if possible, she is going to look into dental appliance to help with ROBERT as well  -Continuing following up with Dr. Valle with cardiology for management of mitral valve disease, coronary artery disease, and hypertension  -Follow up with Dr. Christiansen in 1 year after repeat CT, may return sooner if needed    Smoking status: Reviewed  Vaccination status: Patient reports she is up-to-date with her flu, pneumonia, and Covid vaccines.  Patient is advised to continue to follow CDC recommendations such as social distancing wearing a mask and washing hands for at least 20 seconds.  Medications personally reviewed    Follow Up   Return in about 1 year (around 4/17/2024).  Patient was given instructions and counseling regarding her condition or for health maintenance advice. Please see specific information pulled into the AVS if appropriate.

## 2023-04-17 ENCOUNTER — OFFICE VISIT (OUTPATIENT)
Dept: PULMONOLOGY | Facility: CLINIC | Age: 73
End: 2023-04-17
Payer: MEDICARE

## 2023-04-17 VITALS
HEART RATE: 58 BPM | DIASTOLIC BLOOD PRESSURE: 64 MMHG | HEIGHT: 66 IN | RESPIRATION RATE: 16 BRPM | WEIGHT: 155 LBS | SYSTOLIC BLOOD PRESSURE: 143 MMHG | BODY MASS INDEX: 24.91 KG/M2 | OXYGEN SATURATION: 99 %

## 2023-04-17 DIAGNOSIS — R06.09 DYSPNEA ON EXERTION: ICD-10-CM

## 2023-04-17 DIAGNOSIS — M35.00 SJOGREN'S SYNDROME, WITH UNSPECIFIED ORGAN INVOLVEMENT: ICD-10-CM

## 2023-04-17 DIAGNOSIS — G47.33 OSA (OBSTRUCTIVE SLEEP APNEA): ICD-10-CM

## 2023-04-17 DIAGNOSIS — D63.8 ANEMIA, CHRONIC DISEASE: ICD-10-CM

## 2023-04-17 DIAGNOSIS — R91.1 PULMONARY NODULE: Primary | ICD-10-CM

## 2023-04-17 PROCEDURE — 3077F SYST BP >= 140 MM HG: CPT

## 2023-04-17 PROCEDURE — 3078F DIAST BP <80 MM HG: CPT

## 2023-04-17 PROCEDURE — 1159F MED LIST DOCD IN RCRD: CPT

## 2023-04-17 PROCEDURE — 99214 OFFICE O/P EST MOD 30 MIN: CPT

## 2023-04-17 PROCEDURE — 1160F RVW MEDS BY RX/DR IN RCRD: CPT

## 2023-04-17 RX ORDER — UBIDECARENONE 100 MG
100 CAPSULE ORAL DAILY
COMMUNITY

## 2023-04-17 RX ORDER — LANOLIN ALCOHOL/MO/W.PET/CERES
1000 CREAM (GRAM) TOPICAL DAILY
COMMUNITY

## 2023-04-27 ENCOUNTER — APPOINTMENT (OUTPATIENT)
Dept: CT IMAGING | Facility: HOSPITAL | Age: 73
End: 2023-04-27

## 2023-05-22 ENCOUNTER — LAB (OUTPATIENT)
Dept: LAB | Facility: HOSPITAL | Age: 73
End: 2023-05-22
Payer: MEDICARE

## 2023-05-22 DIAGNOSIS — E78.2 HYPERLIPEMIA, MIXED: ICD-10-CM

## 2023-05-22 LAB
CHOLEST SERPL-MCNC: 191 MG/DL (ref 0–200)
HDLC SERPL-MCNC: 56 MG/DL (ref 40–60)
LDLC SERPL CALC-MCNC: 115 MG/DL (ref 0–100)
LDLC/HDLC SERPL: 2.02 {RATIO}
TRIGL SERPL-MCNC: 110 MG/DL (ref 0–150)
VLDLC SERPL-MCNC: 20 MG/DL (ref 5–40)

## 2023-05-22 PROCEDURE — 36415 COLL VENOUS BLD VENIPUNCTURE: CPT

## 2023-05-22 PROCEDURE — 80061 LIPID PANEL: CPT

## 2023-05-23 ENCOUNTER — TELEPHONE (OUTPATIENT)
Dept: CARDIOLOGY | Facility: CLINIC | Age: 73
End: 2023-05-23
Payer: MEDICARE

## 2023-05-23 DIAGNOSIS — E78.2 HYPERLIPEMIA, MIXED: Primary | ICD-10-CM

## 2023-05-23 NOTE — TELEPHONE ENCOUNTER
----- Message from RAND Leiva sent at 5/23/2023  8:16 AM EDT -----  Notify pt lipid panel is improved but LDL is still elevated, find out if she has continued to get her leqvio injections and taking zetia

## 2023-05-23 NOTE — TELEPHONE ENCOUNTER
Lets get in touch with infusion center to see if she can get scheduled then repeat fasting lipid panel in 1 month after her injection

## 2023-05-23 NOTE — TELEPHONE ENCOUNTER
JASON patient regarding results and recommendations. Voiced understanding.   Patient states she is compliant with zetia. Patient states she should be having her 6 month injection in June but has not been scheduled.

## 2023-06-05 ENCOUNTER — OFFICE VISIT (OUTPATIENT)
Dept: FAMILY MEDICINE CLINIC | Age: 73
End: 2023-06-05
Payer: MEDICARE

## 2023-06-05 VITALS
BODY MASS INDEX: 24.98 KG/M2 | SYSTOLIC BLOOD PRESSURE: 150 MMHG | TEMPERATURE: 97.5 F | HEIGHT: 66 IN | HEART RATE: 56 BPM | DIASTOLIC BLOOD PRESSURE: 73 MMHG | WEIGHT: 155.4 LBS

## 2023-06-05 DIAGNOSIS — M35.00 SJOGREN'S SYNDROME, WITH UNSPECIFIED ORGAN INVOLVEMENT: ICD-10-CM

## 2023-06-05 DIAGNOSIS — I10 HYPERTENSION, UNSPECIFIED TYPE: ICD-10-CM

## 2023-06-05 DIAGNOSIS — Z12.31 ENCOUNTER FOR SCREENING MAMMOGRAM FOR MALIGNANT NEOPLASM OF BREAST: ICD-10-CM

## 2023-06-05 DIAGNOSIS — Z86.39 HISTORY OF HYPERTHYROIDISM: ICD-10-CM

## 2023-06-05 DIAGNOSIS — Z00.00 MEDICARE ANNUAL WELLNESS VISIT, SUBSEQUENT: Primary | ICD-10-CM

## 2023-06-05 NOTE — PROGRESS NOTES
The ABCs of the Annual Wellness Visit  Subsequent Medicare Wellness Visit    Subjective    Anabell Richards is a 73 y.o. female who presents for a Subsequent Medicare Wellness Visit.    The following portions of the patient's history were reviewed and   updated as appropriate: allergies, current medications, past family history, past medical history, past social history, past surgical history, and problem list.    Compared to one year ago, the patient feels her physical   health is better.    Compared to one year ago, the patient feels her mental   health is the same.    Recent Hospitalizations:  She was not admitted to the hospital during the last year.       Current Medical Providers:  Patient Care Team:  Sally Sandhu APRN as PCP - General (Nurse Practitioner)  Jenny Valle MD as Consulting Physician (Cardiology)  Sean Christiansen DO as Consulting Physician (Pulmonary Disease)    Outpatient Medications Prior to Visit   Medication Sig Dispense Refill    aspirin 81 MG chewable tablet Chew 1 tablet Daily.      carvedilol (COREG) 3.125 MG tablet Take 1 tablet by mouth 2 (Two) Times a Day. 180 tablet 3    coenzyme Q10 100 MG capsule Take 1 capsule by mouth Daily.      Collagen-Vitamin C 1000-10 MG tablet Take  by mouth Daily.      ezetimibe (ZETIA) 10 MG tablet Take 1 tablet by mouth Daily. 90 tablet 3    INCLISIRAN SODIUM SC Inject  under the skin into the appropriate area as directed Every 6 (Six) Months.      lisinopril (PRINIVIL,ZESTRIL) 5 MG tablet Take 1 tablet by mouth Daily. 90 tablet 3    multivitamin with minerals tablet tablet Take 1 tablet by mouth Daily.      vitamin B-12 (CYANOCOBALAMIN) 1000 MCG tablet Take 1 tablet by mouth Daily.       No facility-administered medications prior to visit.       No opioid medication identified on active medication list. I have reviewed chart for other potential  high risk medication/s and harmful drug interactions in the elderly.        Aspirin is on  "active medication list. Aspirin use is indicated based on review of current medical condition/s. Pros and cons of this therapy have been discussed today. Benefits of this medication outweigh potential harm.  Patient has been encouraged to continue taking this medication.  .      Patient Active Problem List   Diagnosis    Sjogren's syndrome (HCC)    Anemia, chronic disease    Pulmonary nodule    Mitral valve disease    Recurrent UTI    Hyperlipidemia    Hypertension    Allergies    Coronary artery disease involving native heart without angina pectoris    Dyspnea on exertion    History of hyperthyroidism     Advance Care Planning   Advance Care Planning     Advance Directive is on file.  ACP discussion was held with the patient during this visit. Patient has an advance directive in EMR which is still valid.      Objective    Vitals:    06/05/23 1424 06/05/23 1433 06/05/23 1520   BP: 163/66 166/70 150/73   BP Location: Left arm Right arm Left arm   Patient Position: Sitting Sitting Sitting   Cuff Size: Large Adult Adult Adult   Pulse: 56 56 56   Temp: 97.5 °F (36.4 °C)     TempSrc: Oral     Weight: 70.5 kg (155 lb 6.4 oz)     Height: 167.6 cm (66\")       Estimated body mass index is 25.08 kg/m² as calculated from the following:    Height as of this encounter: 167.6 cm (66\").    Weight as of this encounter: 70.5 kg (155 lb 6.4 oz).    BMI is >= 25 and <30. (Overweight) The following options were offered after discussion;: weight loss educational material (shared in after visit summary)      Does the patient have evidence of cognitive impairment? No    Lab Results   Component Value Date    TRIG 110 05/22/2023    HDL 56 05/22/2023     (H) 05/22/2023    VLDL 20 05/22/2023        HEALTH RISK ASSESSMENT    Smoking Status:  Social History     Tobacco Use   Smoking Status Never    Passive exposure: Past   Smokeless Tobacco Never     Alcohol Consumption:  Social History     Substance and Sexual Activity   Alcohol Use " Never     Fall Risk Screen:    TIFFANYADI Fall Risk Assessment was completed, and patient is at LOW risk for falls.Assessment completed on:2023    Depression Screenin/5/2023     2:25 PM   PHQ-2/PHQ-9 Depression Screening   Little Interest or Pleasure in Doing Things 0-->not at all   Feeling Down, Depressed or Hopeless 0-->not at all   PHQ-9: Brief Depression Severity Measure Score 0       Health Habits and Functional and Cognitive Screenin/5/2023     2:25 PM   Functional & Cognitive Status   Do you have difficulty preparing food and eating? No   Do you have difficulty bathing yourself, getting dressed or grooming yourself? No   Do you have difficulty using the toilet? No   Do you have difficulty moving around from place to place? No   Do you have trouble with steps or getting out of a bed or a chair? No   Current Diet Well Balanced Diet   Dental Exam Up to date   Eye Exam Not up to date   Exercise (times per week) 6 times per week   Current Exercises Include Walking;Other;Yard Work;Gardening   Do you need help using the phone?  No   Are you deaf or do you have serious difficulty hearing?  Yes   Do you need help with transportation? No   Do you need help shopping? No   Do you need help preparing meals?  No   Do you need help with housework?  No   Do you need help with laundry? No   Do you need help taking your medications? No   Do you need help managing money? No   Do you ever drive or ride in a car without wearing a seat belt? No   Have you felt unusual stress, anger or loneliness in the last month? Yes   Who do you live with? Spouse   If you need help, do you have trouble finding someone available to you? No   Have you been bothered in the last four weeks by sexual problems? No   Do you have difficulty concentrating, remembering or making decisions? No       Age-appropriate Screening Schedule:  Refer to the list below for future screening recommendations based on patient's age, sex and/or medical  conditions. Orders for these recommended tests are listed in the plan section. The patient has been provided with a written plan.    Health Maintenance   Topic Date Due    COVID-19 Vaccine (6 - Moderna series) 06/07/2023 (Originally 1/12/2023)    TDAP/TD VACCINES (1 - Tdap) 06/05/2024 (Originally 3/3/1969)    INFLUENZA VACCINE  08/01/2023    DXA SCAN  01/11/2024    MAMMOGRAM  03/27/2024    LIPID PANEL  05/22/2024    ANNUAL WELLNESS VISIT  06/05/2024    COLORECTAL CANCER SCREENING  08/06/2025    HEPATITIS C SCREENING  Completed    Pneumococcal Vaccine 65+  Completed    ZOSTER VACCINE  Completed                  CMS Preventative Services Quick Reference  Risk Factors Identified During Encounter  Immunizations Discussed/Encouraged: Tdap and COVID19  Inactivity/Sedentary: Patient was advised to exercise at least 150 minutes a week per CDC recommendations.  The above risks/problems have been discussed with the patient.  Pertinent information has been shared with the patient in the After Visit Summary.  An After Visit Summary and PPPS were made available to the patient.    Follow Up:   Next Medicare Wellness visit to be scheduled in 1 year.       Additional E&M Note during same encounter follows:  Patient has multiple medical problems which are significant and separately identifiable that require additional work above and beyond the Medicare Wellness Visit.      Chief Complaint  Medicare Wellness-subsequent    Subjective        HPI  Anabell Richards was previously diagnosed with Sjogren's syndrome. Additionally, she has hypertension. Current medication includes carvedilol and lisinopril. She reports that she has been checking at home and BP has been running 130s-140s/80s typically.  She also has hyperlipidemia. No longer taking statin as she had leg cramps and LKAE. Those symptoms have improved. She is seeing cardiology and is on inclisiran. Has lab orders from cardiology to repeat in one month.   She has sleep apnea.  "Compliant with CPAP.   She has history of pulmonary nodule. Was stable on last CT. Followed by pulmonology.     Review of Systems   Constitutional:  Negative for chills and fever.   HENT:  Negative for ear pain and sore throat.    Eyes:  Negative for photophobia and visual disturbance.   Respiratory:  Negative for cough and shortness of breath.    Cardiovascular:  Negative for chest pain and palpitations.   Gastrointestinal:  Negative for abdominal pain and rectal pain.   Skin:  Negative for rash.   Neurological:  Negative for seizures and syncope.   Psychiatric/Behavioral:  Negative for hallucinations and suicidal ideas.      Objective   Vital Signs:  /73 (BP Location: Left arm, Patient Position: Sitting, Cuff Size: Adult)   Pulse 56   Temp 97.5 °F (36.4 °C) (Oral)   Ht 167.6 cm (66\")   Wt 70.5 kg (155 lb 6.4 oz)   BMI 25.08 kg/m²     Physical Exam                    Assessment and Plan   Diagnoses and all orders for this visit:    1. Medicare annual wellness visit, subsequent (Primary)  Comments:  Appropriate screenings and vaccinations were reviewed with the pt and offered as indicated.  Pt counseled on healthy lifestyle including healthy diet, exercise.    2. Encounter for screening mammogram for malignant neoplasm of breast  -     Mammo Screening Digital Tomosynthesis Bilateral With CAD; Future    3. History of hyperthyroidism  Comments:  Repeating thyroid lab when repeat labs  Orders:  -     TSH; Future    4. Hypertension, unspecified type  Comments:  BP elevated today. She will continue to monitor at home and follow up sooner if elevates.  Orders:  -     Comprehensive metabolic panel; Future    5. Sjogren's syndrome, with unspecified organ involvement  Comments:  Rechecking blood count to ensure stability.  Orders:  -     CBC No Differential; Future               Follow Up   Return in about 1 year (around 6/5/2024) for Medicare Wellness.  Patient was given instructions and counseling regarding her " condition or for health maintenance advice. Please see specific information pulled into the AVS if appropriate.

## 2023-06-06 ENCOUNTER — HOSPITAL ENCOUNTER (OUTPATIENT)
Dept: INFUSION THERAPY | Facility: HOSPITAL | Age: 73
Discharge: HOME | End: 2023-06-06
Admitting: INTERNAL MEDICINE
Payer: MEDICARE

## 2023-06-06 VITALS
WEIGHT: 155.42 LBS | BODY MASS INDEX: 24.98 KG/M2 | DIASTOLIC BLOOD PRESSURE: 50 MMHG | SYSTOLIC BLOOD PRESSURE: 143 MMHG | TEMPERATURE: 97.9 F | RESPIRATION RATE: 20 BRPM | OXYGEN SATURATION: 98 % | HEART RATE: 57 BPM | HEIGHT: 66 IN

## 2023-06-06 DIAGNOSIS — I25.10 CORONARY ARTERY DISEASE INVOLVING NATIVE HEART WITHOUT ANGINA PECTORIS, UNSPECIFIED VESSEL OR LESION TYPE: ICD-10-CM

## 2023-06-06 DIAGNOSIS — E78.00 PURE HYPERCHOLESTEROLEMIA: Primary | ICD-10-CM

## 2023-06-06 PROCEDURE — 96372 THER/PROPH/DIAG INJ SC/IM: CPT

## 2023-06-06 PROCEDURE — 25010000002 INCLISIRAN SODIUM 284 MG/1.5ML SOLUTION PREFILLED SYRINGE: Performed by: INTERNAL MEDICINE

## 2023-06-06 RX ADMIN — INCLISIRAN 284 MG: 284 INJECTION, SOLUTION SUBCUTANEOUS at 14:22

## 2023-08-14 ENCOUNTER — TELEPHONE (OUTPATIENT)
Dept: CARDIOLOGY | Facility: CLINIC | Age: 73
End: 2023-08-14
Payer: MEDICARE

## 2023-08-14 DIAGNOSIS — I10 PRIMARY HYPERTENSION: ICD-10-CM

## 2023-08-14 DIAGNOSIS — I05.9 MITRAL VALVE DISEASE: Chronic | ICD-10-CM

## 2023-08-14 DIAGNOSIS — I25.10 CORONARY ARTERY DISEASE INVOLVING NATIVE HEART WITHOUT ANGINA PECTORIS, UNSPECIFIED VESSEL OR LESION TYPE: Chronic | ICD-10-CM

## 2023-08-14 NOTE — TELEPHONE ENCOUNTER
No longer Dr. Valle patient  Ivermectin Counseling:  Patient instructed to take medication on an empty stomach with a full glass of water.  Patient informed of potential adverse effects including but not limited to nausea, diarrhea, dizziness, itching, and swelling of the extremities or lymph nodes.  The patient verbalized understanding of the proper use and possible adverse effects of ivermectin.  All of the patient's questions and concerns were addressed.

## 2023-08-14 NOTE — TELEPHONE ENCOUNTER
The [facility] received a fax that requires your attention. The document has been indexed to the patient's chart for your review.      Reason for sending: EXTERNAL MEDICAL RECORD NOTIFICATION     Documents Description: CARVEDILOL & LISINOPRIL REFILL REQUEST     Name of Sender: PIYUSH     Date Indexed: 8.14.23

## 2023-08-15 RX ORDER — CARVEDILOL 3.12 MG/1
3.12 TABLET ORAL 2 TIMES DAILY
Qty: 180 TABLET | Refills: 3 | Status: SHIPPED | OUTPATIENT
Start: 2023-08-15

## 2023-08-15 RX ORDER — LISINOPRIL 5 MG/1
5 TABLET ORAL DAILY
Qty: 90 TABLET | Refills: 3 | Status: SHIPPED | OUTPATIENT
Start: 2023-08-15

## 2023-11-17 ENCOUNTER — TELEPHONE (OUTPATIENT)
Dept: CARDIOLOGY | Facility: CLINIC | Age: 73
End: 2023-11-17
Payer: MEDICARE

## 2023-11-17 DIAGNOSIS — E78.2 HYPERLIPEMIA, MIXED: Primary | ICD-10-CM

## 2023-11-17 NOTE — TELEPHONE ENCOUNTER
Caller: HARRY ( SCHEDULING)    Relationship: Provider    Best call back number:[phone] , EXT 3 - ASK FOR ANY ONS     What is the best time to reach you: ANYTIME    Who are you requesting to speak with (clinical staff, provider,  specific staff member): CLINICAL    Do you know the name of the person who called: HARRY    What was the call regarding: THEY ARE NEEDING AN AMBULATORY REFERRAL TO ONS FOR PATIENT'S LEQUVO INJECTIONS AND THERAPY PLAN FILLED OUT    Is it okay if the provider responds through MyChart: NO

## 2023-12-06 ENCOUNTER — HOSPITAL ENCOUNTER (OUTPATIENT)
Dept: INFUSION THERAPY | Facility: HOSPITAL | Age: 73
Discharge: HOME | End: 2023-12-06
Admitting: NURSE PRACTITIONER
Payer: MEDICARE

## 2023-12-06 VITALS
WEIGHT: 159.17 LBS | BODY MASS INDEX: 25.69 KG/M2 | DIASTOLIC BLOOD PRESSURE: 64 MMHG | SYSTOLIC BLOOD PRESSURE: 150 MMHG | HEART RATE: 74 BPM | RESPIRATION RATE: 18 BRPM | OXYGEN SATURATION: 97 %

## 2023-12-06 DIAGNOSIS — I25.10 CORONARY ARTERY DISEASE INVOLVING NATIVE HEART WITHOUT ANGINA PECTORIS, UNSPECIFIED VESSEL OR LESION TYPE: ICD-10-CM

## 2023-12-06 DIAGNOSIS — E78.00 PURE HYPERCHOLESTEROLEMIA: Primary | ICD-10-CM

## 2023-12-06 PROCEDURE — 25010000002 INCLISIRAN SODIUM 284 MG/1.5ML SOLUTION PREFILLED SYRINGE: Performed by: NURSE PRACTITIONER

## 2023-12-06 PROCEDURE — 96372 THER/PROPH/DIAG INJ SC/IM: CPT

## 2023-12-06 RX ADMIN — INCLISIRAN 284 MG: 284 INJECTION, SOLUTION SUBCUTANEOUS at 15:53

## 2023-12-18 NOTE — PROGRESS NOTES
[facility]  Cardiology progress Note    Patient Name: Anabell Richards  : 1950    CHIEF COMPLAINT  Hypertension        Subjective   Subjective     HISTORY OF PRESENT ILLNESS    Anabell Richards is a 73 y.o. female history of hypertension coronary disease.  No chest pain or shortness of breath.    REVIEW OF SYSTEMS    Constitutional:    No fever, no weight loss  Skin:     No rash  Otolaryngeal:    No difficulty swallowing  Cardiovascular: See HPI.  Pulmonary:    No cough, no sputum production    Personal History     Social History:    reports that she has never smoked. She has been exposed to tobacco smoke. She has never used smokeless tobacco. She reports that she does not drink alcohol and does not use drugs.    Home Medications:  Current Outpatient Medications on File Prior to Visit   Medication Sig    aspirin 81 MG chewable tablet Chew 1 tablet Daily.    carvedilol (COREG) 3.125 MG tablet Take 1 tablet by mouth 2 (Two) Times a Day.    coenzyme Q10 100 MG capsule Take 1 capsule by mouth Daily.    Collagen-Vitamin C 1000-10 MG tablet Take  by mouth Daily.    INCLISIRAN SODIUM SC Inject  under the skin into the appropriate area as directed Every 6 (Six) Months.    multivitamin with minerals tablet tablet Take 1 tablet by mouth Daily.    vitamin B-12 (CYANOCOBALAMIN) 1000 MCG tablet Take 1 tablet by mouth Daily.    [DISCONTINUED] ezetimibe (ZETIA) 10 MG tablet Take 1 tablet by mouth Daily.    [DISCONTINUED] lisinopril (PRINIVIL,ZESTRIL) 5 MG tablet Take 1 tablet by mouth Daily.     No current facility-administered medications on file prior to visit.       Past Medical History:   Diagnosis Date    Allergies     Coronary artery disease involving native heart without angina pectoris 2021    No significant occlusive coronary disease.  Mild atherosclerotic narrowing seen in multiple vessels in multiple locations on a cath May 25, 2021    Droopy eyelid, bilateral     Heart murmur      Hyperlipidemia     Hypertension     Irritation of right eye 07/08/2022    INSTRUCTED TO NOTIFY SURGEON ASAP    Mitral valve disease 11/10/2021     Mild mitral valve prolapse with mild mitral valve regurgitation.  On echo August 1, 2019    Mitral valve prolapse     Sjogren's syndrome     Sleep apnea        Allergies:  Allergies   Allergen Reactions    Sulfamethoxazole-Trimethoprim Rash       Objective    Objective       Vitals:   Heart Rate:  [58] 58  BP: (176)/(66) 176/66  Body mass index is 25.34 kg/m².     PHYSICAL EXAM:    General Appearance:   well developed  well nourished  HENT:   oropharynx moist  lips not cyanotic  Neck:  thyroid not enlarged  supple  Respiratory:  no respiratory distress  normal breath sounds  no rales  Cardiovascular:  no jugular venous distention  regular rhythm  apical impulse normal  S1 normal, S2 normal  no S3, no S4   no murmur  no rub, no thrill  carotid pulses normal; no bruit  pedal pulses normal  lower extremity edema: none    Skin:   warm, dry  Psychiatric:  judgement and insight appropriate  normal mood and affect        Result Review:  I have personally reviewed the available results from  [x]  Laboratory  [x]  EKG  [x]  Cardiology  [x]  Medications  [x]  Old records  []  Other:     Procedures  Lab Results   Component Value Date    CHOL 159 07/10/2023    CHOL 191 05/22/2023    CHOL 226 (H) 02/28/2023     Lab Results   Component Value Date    TRIG 93 07/10/2023    TRIG 110 05/22/2023    TRIG 109 02/28/2023     Lab Results   Component Value Date    HDL 56 07/10/2023    HDL 56 05/22/2023    HDL 58 02/28/2023     Lab Results   Component Value Date    LDL 86 07/10/2023     (H) 05/22/2023     (H) 02/28/2023     Lab Results   Component Value Date    VLDL 17 07/10/2023    VLDL 20 05/22/2023    VLDL 19 02/28/2023        Impression/Plan:  1.  Essential hypertension controlled: Increase lisinopril to 10 mg once a day.  Continue carvedilol 3.25 mg twice a day.  Monitor blood  pressure regularly.  2.  Stable CAD: Continue aspirin 81 mg once a day.  No chest pain.  3.  Hyperlipidemia: Continue Zetia 10 mg once a day.  Lipid profile shows a LDL of 86.  Monitor lipid and hepatic profile.           Rashi Corona MD   12/21/23   13:08 EST

## 2023-12-21 ENCOUNTER — OFFICE VISIT (OUTPATIENT)
Dept: CARDIOLOGY | Facility: CLINIC | Age: 73
End: 2023-12-21
Payer: MEDICARE

## 2023-12-21 VITALS
BODY MASS INDEX: 25.23 KG/M2 | HEIGHT: 66 IN | HEART RATE: 58 BPM | SYSTOLIC BLOOD PRESSURE: 176 MMHG | DIASTOLIC BLOOD PRESSURE: 66 MMHG | WEIGHT: 157 LBS

## 2023-12-21 DIAGNOSIS — I25.10 CORONARY ARTERY DISEASE INVOLVING NATIVE CORONARY ARTERY OF NATIVE HEART WITHOUT ANGINA PECTORIS: ICD-10-CM

## 2023-12-21 DIAGNOSIS — E78.2 HYPERLIPEMIA, MIXED: ICD-10-CM

## 2023-12-21 DIAGNOSIS — I10 HYPERTENSION, ESSENTIAL: Primary | ICD-10-CM

## 2023-12-21 PROCEDURE — 3077F SYST BP >= 140 MM HG: CPT | Performed by: SPECIALIST

## 2023-12-21 PROCEDURE — 1159F MED LIST DOCD IN RCRD: CPT | Performed by: SPECIALIST

## 2023-12-21 PROCEDURE — 99214 OFFICE O/P EST MOD 30 MIN: CPT | Performed by: SPECIALIST

## 2023-12-21 PROCEDURE — 1160F RVW MEDS BY RX/DR IN RCRD: CPT | Performed by: SPECIALIST

## 2023-12-21 PROCEDURE — 3078F DIAST BP <80 MM HG: CPT | Performed by: SPECIALIST

## 2023-12-21 RX ORDER — EZETIMIBE 10 MG/1
10 TABLET ORAL DAILY
Qty: 90 TABLET | Refills: 3 | Status: SHIPPED | OUTPATIENT
Start: 2023-12-21

## 2023-12-21 RX ORDER — LISINOPRIL 10 MG/1
10 TABLET ORAL DAILY
Qty: 90 TABLET | Refills: 4 | Status: SHIPPED | OUTPATIENT
Start: 2023-12-21

## 2024-03-28 ENCOUNTER — HOSPITAL ENCOUNTER (OUTPATIENT)
Dept: MAMMOGRAPHY | Facility: HOSPITAL | Age: 74
Discharge: HOME | End: 2024-03-28
Admitting: NURSE PRACTITIONER
Payer: MEDICARE

## 2024-03-28 DIAGNOSIS — Z12.31 ENCOUNTER FOR SCREENING MAMMOGRAM FOR MALIGNANT NEOPLASM OF BREAST: ICD-10-CM

## 2024-03-28 PROCEDURE — 77067 SCR MAMMO BI INCL CAD: CPT

## 2024-03-28 PROCEDURE — 77063 BREAST TOMOSYNTHESIS BI: CPT

## 2024-04-16 ENCOUNTER — HOSPITAL ENCOUNTER (OUTPATIENT)
Dept: CT IMAGING | Facility: HOSPITAL | Age: 74
Discharge: HOME | End: 2024-04-16
Payer: MEDICARE

## 2024-04-16 DIAGNOSIS — R91.1 PULMONARY NODULE: ICD-10-CM

## 2024-04-16 PROCEDURE — 71250 CT THORAX DX C-: CPT

## 2024-04-18 DIAGNOSIS — R91.1 PULMONARY NODULE: Primary | ICD-10-CM

## 2024-04-24 ENCOUNTER — OFFICE VISIT (OUTPATIENT)
Dept: PULMONOLOGY | Facility: CLINIC | Age: 74
End: 2024-04-24
Payer: MEDICARE

## 2024-04-24 VITALS
HEIGHT: 65 IN | DIASTOLIC BLOOD PRESSURE: 61 MMHG | HEART RATE: 59 BPM | OXYGEN SATURATION: 99 % | SYSTOLIC BLOOD PRESSURE: 156 MMHG | WEIGHT: 156.6 LBS | RESPIRATION RATE: 16 BRPM | BODY MASS INDEX: 26.09 KG/M2 | TEMPERATURE: 97.8 F

## 2024-04-24 DIAGNOSIS — G47.33 OSA (OBSTRUCTIVE SLEEP APNEA): ICD-10-CM

## 2024-04-24 DIAGNOSIS — R91.8 LUNG NODULES: Primary | ICD-10-CM

## 2024-04-24 PROCEDURE — 3078F DIAST BP <80 MM HG: CPT | Performed by: NURSE PRACTITIONER

## 2024-04-24 PROCEDURE — 1160F RVW MEDS BY RX/DR IN RCRD: CPT | Performed by: NURSE PRACTITIONER

## 2024-04-24 PROCEDURE — 1159F MED LIST DOCD IN RCRD: CPT | Performed by: NURSE PRACTITIONER

## 2024-04-24 PROCEDURE — 99213 OFFICE O/P EST LOW 20 MIN: CPT | Performed by: NURSE PRACTITIONER

## 2024-04-24 PROCEDURE — 3077F SYST BP >= 140 MM HG: CPT | Performed by: NURSE PRACTITIONER

## 2024-04-24 NOTE — PROGRESS NOTES
Primary Care Provider  Sally Sandhu APRN     Referring Provider  No ref. provider found     Chief Complaint  Follow-up (1 year f/up ) and lung nodules    Subjective          Anabell Richards presents to [facility] PULMONARY & CRITICAL CARE MEDICINE  History of Present Illness  Anabell Richards is a 74 y.o. female patient of Dr. Christiansen here for management of pulmonary nodules.    Patient is she is doing very well since her last office visit.  She denies using any antibiotics or steroids for her lungs.  She denies any current fevers or chills.  She denies any shortness of breath.  Patient recently had a chest CT on 4/17/2024 which showed 2 stable pulmonary nodules.  These have been stable since 2021.  At this time, I do not recommend patient having a repeat scan given her lack of symptoms and she is a non-smoker.  If she does develop new or worsening symptoms, she will contact our office and we can order a CT scan at that time.  She no longer wears a CPAP but does have an oral device from a dentist.  Patient states that she believes she is well rested and does not have any complaints of excessive daytime sleepiness or morning headaches.  Overall, she is doing very well and has no additional concerns this time.  She is up-to-date with her respiratory vaccines.     Her history of smoking is   Tobacco Use: Low Risk  (4/24/2024)    Patient History     Smoking Tobacco Use: Never     Smokeless Tobacco Use: Never     Passive Exposure: Past   .    Review of Systems   Constitutional:  Negative for chills, fatigue, fever, unexpected weight gain and unexpected weight loss.   HENT:  Congestion: Nasal.    Respiratory:  Negative for apnea, cough, shortness of breath and wheezing.         Negative for Hemoptysis     Cardiovascular:  Negative for chest pain, palpitations and leg swelling.   Skin:         Negative for cyanosis      Sleep: Negative for Excessive daytime sleepiness  Negative for morning  headaches  Negative for Snoring    Family History   Problem Relation Age of Onset    Heart disease Mother     Hyperlipidemia Mother     Hypertension Mother     Heart disease Father     Hyperlipidemia Father     Hypertension Father     Stroke Other         NOT SPECIFIED    Heart disease Other         NOT SPECIFIED    Cancer Other         NOT SPECIFIED    Cancer Other         NOT SPECIFIED    Malig Hyperthermia Neg Hx         Social History     Socioeconomic History    Marital status:    Tobacco Use    Smoking status: Never     Passive exposure: Past    Smokeless tobacco: Never   Vaping Use    Vaping status: Never Used   Substance and Sexual Activity    Alcohol use: Never    Drug use: Never    Sexual activity: Not Currently        Past Medical History:   Diagnosis Date    Allergies     Coronary artery disease involving native heart without angina pectoris 05/25/2021    No significant occlusive coronary disease.  Mild atherosclerotic narrowing seen in multiple vessels in multiple locations on a cath May 25, 2021    Droopy eyelid, bilateral     Heart murmur     Hyperlipidemia     Hypertension     Irritation of right eye 07/08/2022    INSTRUCTED TO NOTIFY SURGEON ASAP    Mitral valve disease 11/10/2021     Mild mitral valve prolapse with mild mitral valve regurgitation.  On echo August 1, 2019    Mitral valve prolapse     Sjogren's syndrome     Sleep apnea         Immunization History   Administered Date(s) Administered    Arexvy (RSV, Adults 60+ yrs) 01/04/2024    COVID-19 (MODERNA) 1st,2nd,3rd Dose Monovalent 02/23/2021, 03/23/2021, 10/27/2021, 04/04/2022    COVID-19 (MODERNA) BIVALENT 12+YRS 09/12/2022    COVID-19 F23 (MODERNA) 12YRS+ (SPIKEVAX) 10/16/2023    Fluad Quad 65+ 09/12/2022    Fluzone High Dose =>65 Years (Vaxcare ONLY) 09/22/2020    Fluzone High-Dose 65+yrs 10/06/2021, 10/16/2023    Pneumococcal Conjugate 20-Valent (PCV20) 06/02/2022    Pneumococcal Polysaccharide (PPSV23) 10/07/2020    Shingrix  10/07/2020, 01/22/2022, 03/22/2022         Allergies   Allergen Reactions    Sulfamethoxazole-Trimethoprim Rash          Current Outpatient Medications:     aspirin 81 MG chewable tablet, Chew 1 tablet Daily., Disp: , Rfl:     carvedilol (COREG) 3.125 MG tablet, Take 1 tablet by mouth 2 (Two) Times a Day., Disp: 180 tablet, Rfl: 3    coenzyme Q10 100 MG capsule, Take 1 capsule by mouth Daily., Disp: , Rfl:     Collagen-Vitamin C 1000-10 MG tablet, Take  by mouth Daily., Disp: , Rfl:     ezetimibe (ZETIA) 10 MG tablet, Take 1 tablet by mouth Daily., Disp: 90 tablet, Rfl: 3    INCLISIRAN SODIUM SC, Inject  under the skin into the appropriate area as directed Every 6 (Six) Months., Disp: , Rfl:     lisinopril (PRINIVIL,ZESTRIL) 10 MG tablet, Take 1 tablet by mouth Daily., Disp: 90 tablet, Rfl: 4    multivitamin with minerals tablet tablet, Take 1 tablet by mouth Daily., Disp: , Rfl:     vitamin B-12 (CYANOCOBALAMIN) 1000 MCG tablet, Take 1 tablet by mouth Daily., Disp: , Rfl:      Objective   Physical Exam  Constitutional:       General: She is not in acute distress.     Appearance: Normal appearance. She is normal weight.   HENT:      Right Ear: Hearing normal.      Left Ear: Hearing normal.      Nose: No nasal tenderness or congestion.      Mouth/Throat:      Mouth: Mucous membranes are moist. No oral lesions.   Eyes:      Extraocular Movements: Extraocular movements intact.      Pupils: Pupils are equal, round, and reactive to light.   Cardiovascular:      Rate and Rhythm: Normal rate and regular rhythm.      Pulses: Normal pulses.      Heart sounds: Normal heart sounds. No murmur heard.  Pulmonary:      Effort: Pulmonary effort is normal.      Breath sounds: Normal breath sounds. No wheezing, rhonchi or rales.   Musculoskeletal:      Right lower leg: No edema.      Left lower leg: No edema.   Skin:     General: Skin is warm and dry.      Findings: No lesion or rash.   Neurological:      General: No focal deficit  "present.      Mental Status: She is alert and oriented to person, place, and time.   Psychiatric:         Mood and Affect: Affect normal. Mood is not anxious or depressed.         Vital Signs:   /61 (BP Location: Left arm, Patient Position: Sitting, Cuff Size: Adult)   Pulse 59   Temp 97.8 °F (36.6 °C) (Oral)   Resp 16   Ht 165.1 cm (65\")   Wt 71 kg (156 lb 9.6 oz)   SpO2 99% Comment: RA  BMI 26.06 kg/m²        Result Review :   The following data was reviewed by: RAND Cummins on 04/24/2024:  CMP          7/10/2023    11:51   CMP   Glucose 91    BUN 17    Creatinine 0.72    EGFR 89.5    Sodium 139    Potassium 4.6    Chloride 104    Calcium 9.7    Total Protein 7.5    Albumin 4.4    Globulin 3.1    Total Bilirubin 0.5    Alkaline Phosphatase 55    AST (SGOT) 23    ALT (SGPT) 17    Albumin/Globulin Ratio 1.4    BUN/Creatinine Ratio 23.6    Anion Gap 11.8      CBC w/diff          7/10/2023    11:51   CBC w/Diff   WBC 3.30    RBC 4.03    Hemoglobin 12.1    Hematocrit 36.3    MCV 90.1    MCH 30.0    MCHC 33.3    RDW 13.1    Platelets 134      Data reviewed : Radiologic studies chest CT 4/16/2024, chest CT 4/3/2023, chest CT 4/22/2022, chest CT 4/15/2021 and Kae GORDILLO last office note    Procedures        Assessment and Plan    Diagnoses and all orders for this visit:    1. Lung nodules (Primary)  Comments:  Stable.  No further workup needed at this time    2. ROBERT (obstructive sleep apnea)  Comments:  Continue oral device          Follow Up   Return if symptoms worsen or fail to improve.  Patient was given instructions and counseling regarding her condition or for health maintenance advice. Please see specific information pulled into the AVS if appropriate.       "

## 2024-05-16 DIAGNOSIS — I10 PRIMARY HYPERTENSION: ICD-10-CM

## 2024-05-16 DIAGNOSIS — I05.9 MITRAL VALVE DISEASE: Chronic | ICD-10-CM

## 2024-05-16 DIAGNOSIS — I25.10 CORONARY ARTERY DISEASE INVOLVING NATIVE HEART WITHOUT ANGINA PECTORIS, UNSPECIFIED VESSEL OR LESION TYPE: Chronic | ICD-10-CM

## 2024-05-16 RX ORDER — CARVEDILOL 3.12 MG/1
3.12 TABLET ORAL 2 TIMES DAILY
Qty: 180 TABLET | Refills: 3 | Status: SHIPPED | OUTPATIENT
Start: 2024-05-16

## 2024-06-11 ENCOUNTER — HOSPITAL ENCOUNTER (OUTPATIENT)
Dept: INFUSION THERAPY | Facility: HOSPITAL | Age: 74
Discharge: HOME | End: 2024-06-11
Admitting: NURSE PRACTITIONER
Payer: MEDICARE

## 2024-06-11 VITALS
DIASTOLIC BLOOD PRESSURE: 58 MMHG | TEMPERATURE: 97.9 F | RESPIRATION RATE: 18 BRPM | HEART RATE: 56 BPM | BODY MASS INDEX: 25.33 KG/M2 | OXYGEN SATURATION: 99 % | WEIGHT: 157.63 LBS | HEIGHT: 66 IN | SYSTOLIC BLOOD PRESSURE: 148 MMHG

## 2024-06-11 DIAGNOSIS — E78.00 PURE HYPERCHOLESTEROLEMIA: Primary | ICD-10-CM

## 2024-06-11 DIAGNOSIS — I25.10 CORONARY ARTERY DISEASE INVOLVING NATIVE HEART WITHOUT ANGINA PECTORIS, UNSPECIFIED VESSEL OR LESION TYPE: ICD-10-CM

## 2024-06-11 PROCEDURE — 25010000002 INCLISIRAN SODIUM 284 MG/1.5ML SOLUTION PREFILLED SYRINGE: Performed by: NURSE PRACTITIONER

## 2024-06-11 PROCEDURE — 96372 THER/PROPH/DIAG INJ SC/IM: CPT

## 2024-06-11 RX ADMIN — INCLISIRAN 284 MG: 284 INJECTION, SOLUTION SUBCUTANEOUS at 15:33

## 2024-06-13 ENCOUNTER — OFFICE VISIT (OUTPATIENT)
Dept: FAMILY MEDICINE CLINIC | Age: 74
End: 2024-06-13
Payer: MEDICARE

## 2024-06-13 VITALS
BODY MASS INDEX: 25.55 KG/M2 | OXYGEN SATURATION: 94 % | HEIGHT: 66 IN | TEMPERATURE: 98.4 F | SYSTOLIC BLOOD PRESSURE: 140 MMHG | DIASTOLIC BLOOD PRESSURE: 61 MMHG | HEART RATE: 61 BPM | WEIGHT: 159 LBS

## 2024-06-13 DIAGNOSIS — R91.1 PULMONARY NODULE: ICD-10-CM

## 2024-06-13 DIAGNOSIS — I10 PRIMARY HYPERTENSION: ICD-10-CM

## 2024-06-13 DIAGNOSIS — Z78.0 MENOPAUSE: ICD-10-CM

## 2024-06-13 DIAGNOSIS — Z86.39 HISTORY OF HYPERTHYROIDISM: ICD-10-CM

## 2024-06-13 DIAGNOSIS — Z00.00 MEDICARE ANNUAL WELLNESS VISIT, SUBSEQUENT: Primary | ICD-10-CM

## 2024-06-13 DIAGNOSIS — E78.00 PURE HYPERCHOLESTEROLEMIA: ICD-10-CM

## 2024-06-13 PROCEDURE — 1159F MED LIST DOCD IN RCRD: CPT | Performed by: NURSE PRACTITIONER

## 2024-06-13 PROCEDURE — 3077F SYST BP >= 140 MM HG: CPT | Performed by: NURSE PRACTITIONER

## 2024-06-13 PROCEDURE — 1170F FXNL STATUS ASSESSED: CPT | Performed by: NURSE PRACTITIONER

## 2024-06-13 PROCEDURE — 3078F DIAST BP <80 MM HG: CPT | Performed by: NURSE PRACTITIONER

## 2024-06-13 PROCEDURE — G0439 PPPS, SUBSEQ VISIT: HCPCS | Performed by: NURSE PRACTITIONER

## 2024-06-13 PROCEDURE — 1126F AMNT PAIN NOTED NONE PRSNT: CPT | Performed by: NURSE PRACTITIONER

## 2024-06-13 PROCEDURE — 1160F RVW MEDS BY RX/DR IN RCRD: CPT | Performed by: NURSE PRACTITIONER

## 2024-06-13 NOTE — PROGRESS NOTES
The ABCs of the Annual Wellness Visit  Subsequent Medicare Wellness Visit    Subjective    Anabell Richards is a 74 y.o. female who presents for a Subsequent Medicare Wellness Visit.    The following portions of the patient's history were reviewed and   updated as appropriate: allergies, current medications, past family history, past medical history, past social history, past surgical history, and problem list.    Compared to one year ago, the patient feels her physical   health is the same.    Compared to one year ago, the patient feels her mental   health is the same.    Recent Hospitalizations:  She was not admitted to the hospital during the last year.       Current Medical Providers:  Patient Care Team:  Sally Sandhu APRN as PCP - General (Nurse Practitioner)  Sean Christiansen DO as Consulting Physician (Pulmonary Disease)  Rashi Corona MD as Consulting Physician (Cardiology)    Outpatient Medications Prior to Visit   Medication Sig Dispense Refill    aspirin 81 MG chewable tablet Chew 1 tablet Daily.      carvedilol (COREG) 3.125 MG tablet TAKE 1 TABLET BY MOUTH TWICE DAILY 180 tablet 3    coenzyme Q10 100 MG capsule Take 1 capsule by mouth Daily.      Collagen-Vitamin C 1000-10 MG tablet Take  by mouth Daily.      ezetimibe (ZETIA) 10 MG tablet Take 1 tablet by mouth Daily. 90 tablet 3    INCLISIRAN SODIUM SC Inject  under the skin into the appropriate area as directed Every 6 (Six) Months.      lisinopril (PRINIVIL,ZESTRIL) 10 MG tablet Take 1 tablet by mouth Daily. 90 tablet 4    multivitamin with minerals tablet tablet Take 1 tablet by mouth Daily.      vitamin B-12 (CYANOCOBALAMIN) 1000 MCG tablet Take 1 tablet by mouth Daily.       No facility-administered medications prior to visit.       No opioid medication identified on active medication list. I have reviewed chart for other potential  high risk medication/s and harmful drug interactions in the elderly.        Aspirin is on  "active medication list. Aspirin use is indicated based on review of current medical condition/s. Pros and cons of this therapy have been discussed today. Benefits of this medication outweigh potential harm.  Patient has been encouraged to continue taking this medication.  .      Patient Active Problem List   Diagnosis    Sjogren's syndrome    Anemia, chronic disease    Pulmonary nodule    Mitral valve disease    Recurrent UTI    Hyperlipidemia    Hypertension    Allergies    Coronary artery disease involving native heart without angina pectoris    Dyspnea on exertion    History of hyperthyroidism     Advance Care Planning   Advance Care Planning     Advance Directive is on file.  ACP discussion was held with the patient during this visit. Patient has an advance directive in EMR which is still valid.      Objective    Vitals:    24 1419 24 1426   BP: 154/78 140/61   BP Location: Right arm Left arm   Patient Position: Sitting Sitting   Cuff Size: Large Adult Large Adult   Pulse: 70 61   Temp: 98.4 °F (36.9 °C)    TempSrc: Oral    SpO2: 94%    Weight: 72.1 kg (159 lb)    Height: 167.6 cm (66\")      Estimated body mass index is 25.66 kg/m² as calculated from the following:    Height as of this encounter: 167.6 cm (66\").    Weight as of this encounter: 72.1 kg (159 lb).           Does the patient have evidence of cognitive impairment? No          HEALTH RISK ASSESSMENT    Smoking Status:  Social History     Tobacco Use   Smoking Status Never    Passive exposure: Past   Smokeless Tobacco Never     Alcohol Consumption:  Social History     Substance and Sexual Activity   Alcohol Use Never     Fall Risk Screen:    TIFFANYADI Fall Risk Assessment was completed, and patient is at LOW risk for falls.Assessment completed on:2024    Depression Screenin/13/2024     2:20 PM   PHQ-2/PHQ-9 Depression Screening   Little Interest or Pleasure in Doing Things 0-->not at all   Feeling Down, Depressed or Hopeless " 0-->not at all   PHQ-9: Brief Depression Severity Measure Score 0       Health Habits and Functional and Cognitive Screenin/13/2024     2:20 PM   Functional & Cognitive Status   Do you have difficulty preparing food and eating? No   Do you have difficulty bathing yourself, getting dressed or grooming yourself? No   Do you have difficulty using the toilet? No   Do you have difficulty moving around from place to place? No   Do you have trouble with steps or getting out of a bed or a chair? No   Current Diet Well Balanced Diet   Dental Exam Up to date   Eye Exam Up to date   Exercise (times per week) 5 times per week   Current Exercises Include Yard Work;Walking;Weightlifting   Do you need help using the phone?  No   Are you deaf or do you have serious difficulty hearing?  No   Do you need help to go to places out of walking distance? No   Do you need help shopping? No   Do you need help preparing meals?  No   Do you need help with housework?  No   Do you need help with laundry? No   Do you need help taking your medications? No   Do you need help managing money? No   Do you ever drive or ride in a car without wearing a seat belt? No   Have you felt unusual stress, anger or loneliness in the last month? No   Who do you live with? Spouse   If you need help, do you have trouble finding someone available to you? No   Have you been bothered in the last four weeks by sexual problems? No   Do you have difficulty concentrating, remembering or making decisions? No       Age-appropriate Screening Schedule:  Refer to the list below for future screening recommendations based on patient's age, sex and/or medical conditions. Orders for these recommended tests are listed in the plan section. The patient has been provided with a written plan.    Health Maintenance   Topic Date Due    DXA SCAN  2024    LIPID PANEL  07/10/2024    COVID-19 Vaccine ( season) 06/15/2024 (Originally 2024)    TDAP/TD VACCINES  "(1 - Tdap) 06/13/2025 (Originally 3/3/1969)    INFLUENZA VACCINE  08/01/2024    MAMMOGRAM  03/28/2025    ANNUAL WELLNESS VISIT  06/13/2025    BMI FOLLOWUP  06/13/2025    COLORECTAL CANCER SCREENING  08/06/2025    HEPATITIS C SCREENING  Completed    RSV Vaccine - Adults  Completed    Pneumococcal Vaccine 65+  Completed    ZOSTER VACCINE  Completed                  CMS Preventative Services Quick Reference  Risk Factors Identified During Encounter  Immunizations Discussed/Encouraged: Tdap  The above risks/problems have been discussed with the patient.  Pertinent information has been shared with the patient in the After Visit Summary.  An After Visit Summary and PPPS were made available to the patient.    Follow Up:   Next Medicare Wellness visit to be scheduled in 1 year.           Subjective        HPI  Anabell Richards has hypertension. Current medication includes carvedilol and lisinopril.  Dose was increased at last visit with cardiology.   She also has hyperlipidemia. Statins caused leg cramps. . She is seeing cardiology and is on inclisiran.   She has sleep apnea. No longer wearing CPAP.   She has history of pulmonary nodule. Was stable on last CT. Followed by pulmonology.          Objective   Vital Signs:  /61 (BP Location: Left arm, Patient Position: Sitting, Cuff Size: Large Adult)   Pulse 61   Temp 98.4 °F (36.9 °C) (Oral)   Ht 167.6 cm (66\")   Wt 72.1 kg (159 lb)   SpO2 94%   BMI 25.66 kg/m²     Physical Exam  Vitals reviewed.   Constitutional:       General: She is not in acute distress.     Appearance: Normal appearance. She is well-developed.   HENT:      Head: Normocephalic and atraumatic.   Cardiovascular:      Rate and Rhythm: Normal rate and regular rhythm.   Pulmonary:      Effort: Pulmonary effort is normal.      Breath sounds: Normal breath sounds.   Neurological:      Mental Status: She is alert and oriented to person, place, and time.   Psychiatric:         Mood and Affect: Mood " and affect normal.                      Assessment & Plan  Medicare annual wellness visit, subsequent  Appropriate screenings and vaccinations were reviewed with the pt and offered as indicated.  Pt counseled on healthy lifestyle including healthy diet, exercise.    Menopause  Ordering bone density scan.  Pure hypercholesterolemia     Following with cardiology  History of hyperthyroidism  Will return for labs  Primary hypertension    BP improved from last check. Continue current medication.   Pulmonary nodule  Continue follow-up with pulmonology    Orders Placed This Encounter   Procedures    DEXA Bone Density Axial    Comprehensive metabolic panel    TSH               Follow Up   Return in about 1 year (around 6/13/2025) for Medicare Wellness.  Patient was given instructions and counseling regarding her condition or for health maintenance advice. Please see specific information pulled into the AVS if appropriate.

## 2024-06-18 ENCOUNTER — LAB (OUTPATIENT)
Dept: LAB | Facility: HOSPITAL | Age: 74
End: 2024-06-18
Payer: MEDICARE

## 2024-06-18 DIAGNOSIS — Z86.39 HISTORY OF HYPERTHYROIDISM: ICD-10-CM

## 2024-06-18 DIAGNOSIS — E78.00 PURE HYPERCHOLESTEROLEMIA: ICD-10-CM

## 2024-06-18 DIAGNOSIS — E78.2 HYPERLIPEMIA, MIXED: ICD-10-CM

## 2024-06-18 LAB
ALBUMIN SERPL-MCNC: 4.4 G/DL (ref 3.5–5.2)
ALBUMIN/GLOB SERPL: 1.5 G/DL
ALP SERPL-CCNC: 50 U/L (ref 39–117)
ALT SERPL W P-5'-P-CCNC: 14 U/L (ref 1–33)
ANION GAP SERPL CALCULATED.3IONS-SCNC: 8 MMOL/L (ref 5–15)
AST SERPL-CCNC: 17 U/L (ref 1–32)
BILIRUB CONJ SERPL-MCNC: <0.2 MG/DL (ref 0–0.3)
BILIRUB SERPL-MCNC: 0.3 MG/DL (ref 0–1.2)
BUN SERPL-MCNC: 14 MG/DL (ref 8–23)
BUN/CREAT SERPL: 19.2 (ref 7–25)
CALCIUM SPEC-SCNC: 9.1 MG/DL (ref 8.6–10.5)
CHLORIDE SERPL-SCNC: 106 MMOL/L (ref 98–107)
CHOLEST SERPL-MCNC: 164 MG/DL (ref 0–200)
CO2 SERPL-SCNC: 27 MMOL/L (ref 22–29)
CREAT SERPL-MCNC: 0.73 MG/DL (ref 0.57–1)
EGFRCR SERPLBLD CKD-EPI 2021: 87.5 ML/MIN/1.73
GLOBULIN UR ELPH-MCNC: 3 GM/DL
GLUCOSE SERPL-MCNC: 87 MG/DL (ref 65–99)
HDLC SERPL-MCNC: 62 MG/DL (ref 40–60)
LDLC SERPL CALC-MCNC: 82 MG/DL (ref 0–100)
LDLC/HDLC SERPL: 1.28 {RATIO}
POTASSIUM SERPL-SCNC: 4.8 MMOL/L (ref 3.5–5.2)
PROT SERPL-MCNC: 7.4 G/DL (ref 6–8.5)
SODIUM SERPL-SCNC: 141 MMOL/L (ref 136–145)
TRIGL SERPL-MCNC: 114 MG/DL (ref 0–150)
TSH SERPL DL<=0.05 MIU/L-ACNC: 1.82 UIU/ML (ref 0.27–4.2)
VLDLC SERPL-MCNC: 20 MG/DL (ref 5–40)

## 2024-06-18 PROCEDURE — 84443 ASSAY THYROID STIM HORMONE: CPT

## 2024-06-18 PROCEDURE — 80061 LIPID PANEL: CPT

## 2024-06-18 PROCEDURE — 82248 BILIRUBIN DIRECT: CPT

## 2024-06-18 PROCEDURE — 80053 COMPREHEN METABOLIC PANEL: CPT

## 2024-06-18 PROCEDURE — 36415 COLL VENOUS BLD VENIPUNCTURE: CPT

## 2024-06-19 ENCOUNTER — TELEPHONE (OUTPATIENT)
Dept: CARDIOLOGY | Facility: CLINIC | Age: 74
End: 2024-06-19
Payer: MEDICARE

## 2024-06-19 NOTE — TELEPHONE ENCOUNTER
----- Message from Kristyn Whitehead sent at 6/19/2024  3:55 PM EDT -----  Lipid panel is stable and liver enzymes are in normal range.  Continue current medication

## 2024-06-27 ENCOUNTER — HOSPITAL ENCOUNTER (OUTPATIENT)
Dept: BONE DENSITY | Facility: HOSPITAL | Age: 74
Discharge: HOME | End: 2024-06-27
Admitting: NURSE PRACTITIONER
Payer: MEDICARE

## 2024-06-27 DIAGNOSIS — Z78.0 MENOPAUSE: ICD-10-CM

## 2024-06-27 PROCEDURE — 77080 DXA BONE DENSITY AXIAL: CPT

## 2024-10-16 ENCOUNTER — OFFICE VISIT (OUTPATIENT)
Dept: CARDIOLOGY | Facility: CLINIC | Age: 74
End: 2024-10-16
Payer: MEDICARE

## 2024-10-16 VITALS
SYSTOLIC BLOOD PRESSURE: 166 MMHG | DIASTOLIC BLOOD PRESSURE: 65 MMHG | BODY MASS INDEX: 25.65 KG/M2 | WEIGHT: 159.6 LBS | HEART RATE: 56 BPM | HEIGHT: 66 IN

## 2024-10-16 DIAGNOSIS — E78.00 PURE HYPERCHOLESTEROLEMIA: Primary | ICD-10-CM

## 2024-10-16 DIAGNOSIS — I25.10 CORONARY ARTERY DISEASE INVOLVING NATIVE CORONARY ARTERY OF NATIVE HEART WITHOUT ANGINA PECTORIS: Primary | Chronic | ICD-10-CM

## 2024-10-16 DIAGNOSIS — E78.2 MIXED HYPERLIPIDEMIA: ICD-10-CM

## 2024-10-16 DIAGNOSIS — I25.10 CORONARY ARTERY DISEASE INVOLVING NATIVE HEART WITHOUT ANGINA PECTORIS, UNSPECIFIED VESSEL OR LESION TYPE: ICD-10-CM

## 2024-10-16 DIAGNOSIS — I34.1 MILD MITRAL VALVE PROLAPSE: ICD-10-CM

## 2024-10-16 DIAGNOSIS — I10 PRIMARY HYPERTENSION: ICD-10-CM

## 2024-10-16 PROBLEM — R06.09 DYSPNEA ON EXERTION: Status: RESOLVED | Noted: 2022-04-27 | Resolved: 2024-10-16

## 2024-10-16 PROCEDURE — 3078F DIAST BP <80 MM HG: CPT | Performed by: NURSE PRACTITIONER

## 2024-10-16 PROCEDURE — 1160F RVW MEDS BY RX/DR IN RCRD: CPT | Performed by: NURSE PRACTITIONER

## 2024-10-16 PROCEDURE — 3077F SYST BP >= 140 MM HG: CPT | Performed by: NURSE PRACTITIONER

## 2024-10-16 PROCEDURE — 1159F MED LIST DOCD IN RCRD: CPT | Performed by: NURSE PRACTITIONER

## 2024-10-16 PROCEDURE — 99214 OFFICE O/P EST MOD 30 MIN: CPT | Performed by: NURSE PRACTITIONER

## 2024-10-16 RX ORDER — LISINOPRIL 20 MG/1
20 TABLET ORAL DAILY
Qty: 90 TABLET | Refills: 1 | Status: SHIPPED | OUTPATIENT
Start: 2024-10-16

## 2024-10-16 RX ORDER — TRETINOIN 0.25 MG/G
1 CREAM TOPICAL NIGHTLY
COMMUNITY

## 2024-10-16 NOTE — PROGRESS NOTES
Chief Complaint  Follow-up, Hypertension, Coronary Artery Disease, and Hyperlipidemia    Subjective            History of Present Illness  Anabell Richards is a 74-year-old female patient who presents to the office today for follow up. She has CAD, hypertension, hyperlipidemia, and mld mitral valve prolapse. She is compliant with medications. She denies any new or worsening cardiac symptoms today.    PMH  Past Medical History:   Diagnosis Date    Allergies     Coronary artery disease involving native heart without angina pectoris 05/25/2021    No significant occlusive coronary disease.  Mild atherosclerotic narrowing seen in multiple vessels in multiple locations on a cath May 25, 2021    Droopy eyelid, bilateral     Heart murmur     Hyperlipidemia     Hypertension     Irritation of right eye 07/08/2022    INSTRUCTED TO NOTIFY SURGEON ASAP    Mitral valve disease 11/10/2021     Mild mitral valve prolapse with mild mitral valve regurgitation.  On echo August 1, 2019    Mitral valve prolapse     Sjogren's syndrome     Sleep apnea          ALLERGY  Allergies   Allergen Reactions    Sulfamethoxazole-Trimethoprim Rash          SURGICALHX  Past Surgical History:   Procedure Laterality Date    ABDOMINAL SURGERY      URETHAL SLING    BLEPHAROPLASTY Bilateral 07/14/2022    Procedure: BILATERAL LOWER LID BLEPHAROPLASTY;  Surgeon: Twan Todd MD;  Location: [facility];  Service: New Image;  Laterality: Bilateral;    BLEPHAROPLASTY Bilateral 07/14/2022    Procedure: BILATERAL UPPER LID BLEPHAROPLASTY;  Surgeon: Twan Todd MD;  Location: [facility];  Service: Ophthalmology;  Laterality: Bilateral;    CARDIAC CATHETERIZATION      CATARACT EXTRACTION W/ INTRAOCULAR LENS  IMPLANT, BILATERAL Bilateral     DILATATION AND CURETTAGE            SOC  Social History     Socioeconomic History    Marital status:    Tobacco Use    Smoking status: Never     Passive exposure: Past    Smokeless tobacco:  "Never   Vaping Use    Vaping status: Never Used   Substance and Sexual Activity    Alcohol use: Never    Drug use: Never    Sexual activity: Not Currently         FAMHX  Family History   Problem Relation Age of Onset    Heart disease Mother     Hyperlipidemia Mother     Hypertension Mother     Heart disease Father     Hyperlipidemia Father     Hypertension Father     Stroke Other         NOT SPECIFIED    Heart disease Other         NOT SPECIFIED    Cancer Other         NOT SPECIFIED    Cancer Other         NOT SPECIFIED    Malig Hyperthermia Neg Hx           MEDSIGONLY  Current Outpatient Medications on File Prior to Visit   Medication Sig    aspirin 81 MG chewable tablet Chew 1 tablet Daily.    carvedilol (COREG) 3.125 MG tablet TAKE 1 TABLET BY MOUTH TWICE DAILY    coenzyme Q10 100 MG capsule Take 1 capsule by mouth Daily.    ezetimibe (ZETIA) 10 MG tablet Take 1 tablet by mouth Daily.    INCLISIRAN SODIUM SC Inject  under the skin into the appropriate area as directed Every 6 (Six) Months.    lisinopril (PRINIVIL,ZESTRIL) 10 MG tablet Take 1 tablet by mouth Daily.    multivitamin with minerals tablet tablet Take 1 tablet by mouth Daily.    tretinoin (RETIN-A) 0.025 % cream Apply 1 Application topically to the appropriate area as directed Every Night.    vitamin B-12 (CYANOCOBALAMIN) 1000 MCG tablet Take 1 tablet by mouth Daily.    Collagen-Vitamin C 1000-10 MG tablet Take  by mouth Daily. (Patient not taking: Reported on 10/16/2024)     No current facility-administered medications on file prior to visit.         Objective   /65   Pulse 56   Ht 167.6 cm (65.98\")   Wt 72.4 kg (159 lb 9.6 oz)   BMI 25.77 kg/m²       Physical Exam  HENT:      Head: Normocephalic.   Neck:      Vascular: No carotid bruit.   Cardiovascular:      Rate and Rhythm: Regular rhythm. Bradycardia present.      Pulses: Normal pulses.      Heart sounds: Murmur heard.   Pulmonary:      Effort: Pulmonary effort is normal.      Breath " "sounds: Normal breath sounds.   Musculoskeletal:      Cervical back: Neck supple.      Right lower leg: No edema.      Left lower leg: No edema.   Skin:     General: Skin is dry.   Neurological:      Mental Status: She is alert and oriented to person, place, and time.   Psychiatric:         Behavior: Behavior normal.       Result Review :   The following data was reviewed by: RAND Epstein on 10/16/2024:  No results found for: \"PROBNP\"  CMP          6/18/2024    11:42   CMP   Glucose 87    BUN 14    Creatinine 0.73    EGFR 87.5    Sodium 141    Potassium 4.8    Chloride 106    Calcium 9.1    Total Protein 7.4    Albumin 4.4    Globulin 3.0    Total Bilirubin 0.3    Alkaline Phosphatase 50    AST (SGOT) 17    ALT (SGPT) 14    Albumin/Globulin Ratio 1.5    BUN/Creatinine Ratio 19.2    Anion Gap 8.0         Lab Results   Component Value Date    TSH 1.124 08/23/2024      Lab Results   Component Value Date    FREET4 1.1 05/25/2021      No results found for: \"DDIMERQUANT\"  Magnesium   Date Value Ref Range Status   08/23/2024 1.9 1.8 - 2.4 mg/dL Final      No results found for: \"DIGOXIN\"   No results found for: \"TROPONINT\"        Lipid Panel          6/18/2024    11:42   Lipid Panel   Total Cholesterol 164    Triglycerides 114    HDL Cholesterol 62    VLDL Cholesterol 20    LDL Cholesterol  82    LDL/HDL Ratio 1.28             Assessment and Plan    Diagnoses and all orders for this visit:    1. Coronary artery disease involving native coronary artery of native heart without angina pectoris (Primary)  She denies angina, continue aspirin 81 mg daily.     2. Primary hypertension  Blood pressure is elevated in office today, recommend increasing lisinopril dose to 20 mg daily. Check blood pressure twice a day for the next two weeks, blood pressure log provided for patient. Low sodium diet advised.    3. Mixed hyperlipidemia  Last lipid panel was 6/18/24 with LDL 82 which is slightly above goal range. She has been " getting Leqvio injections and next one is due in December. Repeat fasting lipid and hepatic function panel in 3 months. If LDL remains above 70 then would recommend adding statin therapy.  -     Basic Metabolic Panel; Future  -     Lipid Panel; Future  -     Hepatic Function Panel; Future    4. Mild mitral valve prolapse  Asymptomatic today, continue to monitor with repeat echocardiogram.  -     Adult Transthoracic Echo Complete W/ Cont if Necessary Per Protocol; Future    Other orders  -     lisinopril (PRINIVIL,ZESTRIL) 20 MG tablet; Take 1 tablet by mouth Daily.  Dispense: 90 tablet; Refill: 1            Follow Up   Return in about 6 months (around 4/16/2025) for Follow up with Dr Corona.    Patient was given instructions and counseling regarding her condition or for health maintenance advice. Please see specific information pulled into the AVS if appropriate.     Anabell Richards  reports that she has never smoked. She has been exposed to tobacco smoke. She has never used smokeless tobacco.          RAND Epstein  10/16/24  11:43 EDT    Dictated Utilizing Dragon Dictation

## 2024-11-07 ENCOUNTER — OFFICE VISIT (OUTPATIENT)
Dept: FAMILY MEDICINE CLINIC | Age: 74
End: 2024-11-07
Payer: MEDICARE

## 2024-11-07 VITALS
SYSTOLIC BLOOD PRESSURE: 151 MMHG | HEART RATE: 57 BPM | OXYGEN SATURATION: 98 % | TEMPERATURE: 98 F | WEIGHT: 163 LBS | DIASTOLIC BLOOD PRESSURE: 58 MMHG | BODY MASS INDEX: 26.2 KG/M2 | HEIGHT: 66 IN

## 2024-11-07 DIAGNOSIS — L03.116 CELLULITIS OF LEFT LOWER LEG: Primary | ICD-10-CM

## 2024-11-07 DIAGNOSIS — I10 PRIMARY HYPERTENSION: ICD-10-CM

## 2024-11-07 PROCEDURE — 99214 OFFICE O/P EST MOD 30 MIN: CPT | Performed by: NURSE PRACTITIONER

## 2024-11-07 PROCEDURE — 3077F SYST BP >= 140 MM HG: CPT | Performed by: NURSE PRACTITIONER

## 2024-11-07 PROCEDURE — 3078F DIAST BP <80 MM HG: CPT | Performed by: NURSE PRACTITIONER

## 2024-11-07 PROCEDURE — 1126F AMNT PAIN NOTED NONE PRSNT: CPT | Performed by: NURSE PRACTITIONER

## 2024-11-07 PROCEDURE — 1159F MED LIST DOCD IN RCRD: CPT | Performed by: NURSE PRACTITIONER

## 2024-11-07 PROCEDURE — 1160F RVW MEDS BY RX/DR IN RCRD: CPT | Performed by: NURSE PRACTITIONER

## 2024-11-07 NOTE — PROGRESS NOTES
Chief Complaint  Anabell Richards presents to [facility] for Cellulitis (On left lower leg, symptoms have improved )    Subjective          History of Present Illness    Kathryn is following up after ER visit at [facility] on 10/28/24. Venous doppler left leg without evidence of deep venous thrombosis. Diagnosed with cellulitis of left leg. Treated with cephalexin for 7 days. Finished antibiotics 4 days ago. Notes improvement in symptoms. No longer with erythema. No leg pain. Denies s/s venous insufficiency.   Zestril dose increased approximately 3 weeks ago by cardiology for HTN. She is monitoring at home.     Review of Systems      Allergies   Allergen Reactions    Sulfamethoxazole-Trimethoprim Rash      Past Medical History:   Diagnosis Date    Allergies     Coronary artery disease involving native heart without angina pectoris 05/25/2021    No significant occlusive coronary disease.  Mild atherosclerotic narrowing seen in multiple vessels in multiple locations on a cath May 25, 2021    Droopy eyelid, bilateral     Heart murmur     Hyperlipidemia     Hypertension     Irritation of right eye 07/08/2022    INSTRUCTED TO NOTIFY SURGEON ASAP    Mitral valve disease 11/10/2021     Mild mitral valve prolapse with mild mitral valve regurgitation.  On echo August 1, 2019    Mitral valve prolapse     Sjogren's syndrome     Sleep apnea      Current Outpatient Medications   Medication Sig Dispense Refill    aspirin 81 MG chewable tablet Chew 1 tablet Daily.      Calcium Carb-Cholecalciferol (CALCIUM + VITAMIN D3 PO) Take  by mouth Daily.      carvedilol (COREG) 3.125 MG tablet TAKE 1 TABLET BY MOUTH TWICE DAILY 180 tablet 3    coenzyme Q10 100 MG capsule Take 1 capsule by mouth Daily.      Collagen-Vitamin C 1000-10 MG tablet Take  by mouth Daily.      ezetimibe (ZETIA) 10 MG tablet Take 1 tablet by mouth Daily. 90 tablet 3    INCLISIRAN SODIUM SC Inject  under the skin into the appropriate  area as directed Every 6 (Six) Months.      lisinopril (PRINIVIL,ZESTRIL) 20 MG tablet Take 1 tablet by mouth Daily. 90 tablet 1    multivitamin with minerals tablet tablet Take 1 tablet by mouth Daily.      tretinoin (RETIN-A) 0.025 % cream Apply 1 Application topically to the appropriate area as directed Every Night.      vitamin B-12 (CYANOCOBALAMIN) 1000 MCG tablet Take 1 tablet by mouth Daily.       No current facility-administered medications for this visit.     Past Surgical History:   Procedure Laterality Date    ABDOMINAL SURGERY      URETHAL SLING    BLEPHAROPLASTY Bilateral 07/14/2022    Procedure: BILATERAL LOWER LID BLEPHAROPLASTY;  Surgeon: Twan Todd MD;  Location: [facility];  Service: New Image;  Laterality: Bilateral;    BLEPHAROPLASTY Bilateral 07/14/2022    Procedure: BILATERAL UPPER LID BLEPHAROPLASTY;  Surgeon: Twan Todd MD;  Location: [facility];  Service: Ophthalmology;  Laterality: Bilateral;    CARDIAC CATHETERIZATION      CATARACT EXTRACTION W/ INTRAOCULAR LENS  IMPLANT, BILATERAL Bilateral     DILATATION AND CURETTAGE        Social History     Tobacco Use    Smoking status: Never     Passive exposure: Past    Smokeless tobacco: Never   Vaping Use    Vaping status: Never Used   Substance Use Topics    Alcohol use: Never    Drug use: Never     Family History   Problem Relation Age of Onset    Heart disease Mother     Hyperlipidemia Mother     Hypertension Mother     Heart disease Father     Hyperlipidemia Father     Hypertension Father     Stroke Other         NOT SPECIFIED    Heart disease Other         NOT SPECIFIED    Cancer Other         NOT SPECIFIED    Cancer Other         NOT SPECIFIED    Malig Hyperthermia Neg Hx      There are no preventive care reminders to display for this patient.   Immunization History   Administered Date(s) Administered    Arexvy (RSV, Adults 60+ yrs) 01/04/2024    COVID-19 (MODERNA) 12YRS+ (SPIKEVAX) 10/16/2023, 09/30/2024  "   COVID-19 (MODERNA) 1st,2nd,3rd Dose Monovalent 02/23/2021, 03/23/2021, 10/27/2021, 04/04/2022    COVID-19 (MODERNA) BIVALENT 12+YRS 09/12/2022    Fluad Quad 65+ 09/12/2022    Fluzone High-Dose 65+YRS 09/22/2020, 09/30/2024    Fluzone High-Dose 65+yrs 10/06/2021, 10/16/2023    Pneumococcal Conjugate 20-Valent (PCV20) 06/02/2022    Pneumococcal Polysaccharide (PPSV23) 10/07/2020    Shingrix 10/07/2020, 01/22/2022, 03/22/2022        Objective     Vitals:    11/07/24 1418   BP: 151/58   Pulse: 57   Temp: 98 °F (36.7 °C)   TempSrc: Oral   SpO2: 98%   Weight: 73.9 kg (163 lb)   Height: 167.6 cm (65.98\")     Body mass index is 26.32 kg/m².                No results found.    Physical Exam  Vitals reviewed.   Constitutional:       General: She is not in acute distress.     Appearance: Normal appearance. She is well-developed.   HENT:      Head: Normocephalic and atraumatic.   Cardiovascular:      Rate and Rhythm: Normal rate and regular rhythm.   Pulmonary:      Effort: Pulmonary effort is normal.      Breath sounds: Normal breath sounds.   Musculoskeletal:      Comments: LLE without erythema, edema.    Neurological:      Mental Status: She is alert and oriented to person, place, and time.   Psychiatric:         Mood and Affect: Mood and affect normal.           Result Review :     The following data was reviewed by: RAND Whitten on 11/07/2024:          US doppler venous leg left (10/28/2024 17:07)                 Assessment and Plan      Assessment & Plan  Cellulitis of left lower leg  Symptoms resolved. Follow up for recurrent or worsening symptoms.        Primary hypertension    BP is elevated today but improved from BP at cardiology appt last month. Her medication dose was increased at that time. She is monitoring at home and has follow up scheduled with cardiology.                    Follow Up     No follow-ups on file.             "

## 2024-11-07 NOTE — ASSESSMENT & PLAN NOTE
BP is elevated today but improved from BP at cardiology appt last month. Her medication dose was increased at that time. She is monitoring at home and has follow up scheduled with cardiology.

## 2024-11-11 ENCOUNTER — TELEPHONE (OUTPATIENT)
Dept: CARDIOLOGY | Facility: CLINIC | Age: 74
End: 2024-11-11
Payer: MEDICARE

## 2024-11-11 ENCOUNTER — HOSPITAL ENCOUNTER (OUTPATIENT)
Dept: CARDIOLOGY | Facility: HOSPITAL | Age: 74
Discharge: HOME | End: 2024-11-11
Admitting: NURSE PRACTITIONER
Payer: MEDICARE

## 2024-11-11 DIAGNOSIS — I05.9 MITRAL VALVE DISEASE: ICD-10-CM

## 2024-11-11 DIAGNOSIS — R63.5 WEIGHT GAIN: ICD-10-CM

## 2024-11-11 DIAGNOSIS — R06.02 SHORTNESS OF BREATH: ICD-10-CM

## 2024-11-11 DIAGNOSIS — I10 HYPERTENSION, ESSENTIAL: Primary | ICD-10-CM

## 2024-11-11 DIAGNOSIS — I34.1 MILD MITRAL VALVE PROLAPSE: ICD-10-CM

## 2024-11-11 DIAGNOSIS — E78.2 MIXED HYPERLIPIDEMIA: ICD-10-CM

## 2024-11-11 DIAGNOSIS — R60.9 SWELLING: ICD-10-CM

## 2024-11-11 DIAGNOSIS — I25.10 CORONARY ARTERY DISEASE INVOLVING NATIVE CORONARY ARTERY OF NATIVE HEART WITHOUT ANGINA PECTORIS: ICD-10-CM

## 2024-11-11 PROCEDURE — 93306 TTE W/DOPPLER COMPLETE: CPT

## 2024-11-11 PROCEDURE — 93306 TTE W/DOPPLER COMPLETE: CPT | Performed by: SPECIALIST

## 2024-11-11 RX ORDER — POTASSIUM CHLORIDE 750 MG/1
10 TABLET, EXTENDED RELEASE ORAL DAILY
Qty: 7 TABLET | Refills: 0 | Status: SHIPPED | OUTPATIENT
Start: 2024-11-11

## 2024-11-11 RX ORDER — FUROSEMIDE 20 MG/1
20 TABLET ORAL DAILY
Qty: 7 TABLET | Refills: 0 | Status: SHIPPED | OUTPATIENT
Start: 2024-11-11

## 2024-11-12 ENCOUNTER — TELEPHONE (OUTPATIENT)
Dept: CARDIOLOGY | Facility: CLINIC | Age: 74
End: 2024-11-12
Payer: MEDICARE

## 2024-11-12 LAB
AORTIC DIMENSIONLESS INDEX: 0.7 (DI)
BH CV ECHO MEAS - ACS: 1.7 CM
BH CV ECHO MEAS - AO MAX PG: 7.5 MMHG
BH CV ECHO MEAS - AO MEAN PG: 4 MMHG
BH CV ECHO MEAS - AO ROOT DIAM: 3 CM
BH CV ECHO MEAS - AO V2 MAX: 137 CM/SEC
BH CV ECHO MEAS - AO V2 VTI: 35.2 CM
BH CV ECHO MEAS - AVA(I,D): 2.19 CM2
BH CV ECHO MEAS - EDV(CUBED): 59.3 ML
BH CV ECHO MEAS - EDV(MOD-SP2): 68.8 ML
BH CV ECHO MEAS - EDV(MOD-SP4): 66.6 ML
BH CV ECHO MEAS - EF(MOD-BP): 67.9 %
BH CV ECHO MEAS - EF(MOD-SP2): 64 %
BH CV ECHO MEAS - EF(MOD-SP4): 71.6 %
BH CV ECHO MEAS - ESV(CUBED): 10.6 ML
BH CV ECHO MEAS - ESV(MOD-SP2): 24.8 ML
BH CV ECHO MEAS - ESV(MOD-SP4): 18.9 ML
BH CV ECHO MEAS - FS: 43.6 %
BH CV ECHO MEAS - IVS/LVPW: 1.09 CM
BH CV ECHO MEAS - IVSD: 1.2 CM
BH CV ECHO MEAS - LA DIMENSION: 4.2 CM
BH CV ECHO MEAS - LAT PEAK E' VEL: 10.3 CM/SEC
BH CV ECHO MEAS - LV MASS(C)D: 149.5 GRAMS
BH CV ECHO MEAS - LV MAX PG: 3.8 MMHG
BH CV ECHO MEAS - LV MEAN PG: 2 MMHG
BH CV ECHO MEAS - LV V1 MAX: 97.8 CM/SEC
BH CV ECHO MEAS - LV V1 VTI: 24.5 CM
BH CV ECHO MEAS - LVIDD: 3.9 CM
BH CV ECHO MEAS - LVIDS: 2.2 CM
BH CV ECHO MEAS - LVOT AREA: 3.1 CM2
BH CV ECHO MEAS - LVOT DIAM: 2 CM
BH CV ECHO MEAS - LVPWD: 1.1 CM
BH CV ECHO MEAS - MED PEAK E' VEL: 7.9 CM/SEC
BH CV ECHO MEAS - MR MAX PG: 108 MMHG
BH CV ECHO MEAS - MR MAX VEL: 519.5 CM/SEC
BH CV ECHO MEAS - MV A MAX VEL: 102 CM/SEC
BH CV ECHO MEAS - MV DEC SLOPE: 557 CM/SEC2
BH CV ECHO MEAS - MV DEC TIME: 0.21 SEC
BH CV ECHO MEAS - MV E MAX VEL: 108 CM/SEC
BH CV ECHO MEAS - MV E/A: 1.06
BH CV ECHO MEAS - MV MEAN PG: 2 MMHG
BH CV ECHO MEAS - MV P1/2T: 71 MSEC
BH CV ECHO MEAS - MV V2 VTI: 45.6 CM
BH CV ECHO MEAS - MVA(P1/2T): 3.1 CM2
BH CV ECHO MEAS - MVA(VTI): 1.69 CM2
BH CV ECHO MEAS - PA V2 MAX: 92.7 CM/SEC
BH CV ECHO MEAS - PULM A REVS DUR: 0.14 SEC
BH CV ECHO MEAS - PULM A REVS VEL: 32.7 CM/SEC
BH CV ECHO MEAS - PULM DIAS VEL: 34.1 CM/SEC
BH CV ECHO MEAS - PULM S/D: 1.67
BH CV ECHO MEAS - PULM SYS VEL: 56.8 CM/SEC
BH CV ECHO MEAS - QP/QS: 0.69
BH CV ECHO MEAS - RV MAX PG: 2.03 MMHG
BH CV ECHO MEAS - RV V1 MAX: 71.3 CM/SEC
BH CV ECHO MEAS - RV V1 VTI: 21 CM
BH CV ECHO MEAS - RVDD: 3.5 CM
BH CV ECHO MEAS - RVOT DIAM: 1.8 CM
BH CV ECHO MEAS - SV(LVOT): 77 ML
BH CV ECHO MEAS - SV(MOD-SP2): 44 ML
BH CV ECHO MEAS - SV(MOD-SP4): 47.7 ML
BH CV ECHO MEAS - SV(RVOT): 53.4 ML
BH CV ECHO MEAS - TAPSE (>1.6): 1.75 CM
BH CV ECHO MEASUREMENTS AVERAGE E/E' RATIO: 11.87
BH CV XLRA - TDI S': 14.5 CM/SEC
IVRT: 66 MS
LEFT ATRIUM VOLUME INDEX: 36.4 ML/M2

## 2024-11-12 NOTE — TELEPHONE ENCOUNTER
----- Message from Kristyn Whitehead sent at 11/12/2024 11:24 AM EST -----  Echocardiogram shows normal heart muscle function.  There is mild mitral valve prolapse with mild regurgitation noted which is unchanged from previous imaging.  Follow-up as scheduled

## 2024-11-15 RX ORDER — EZETIMIBE 10 MG/1
10 TABLET ORAL DAILY
Qty: 90 TABLET | Refills: 1 | Status: SHIPPED | OUTPATIENT
Start: 2024-11-15

## 2024-12-05 ENCOUNTER — LAB (OUTPATIENT)
Dept: LAB | Facility: HOSPITAL | Age: 74
End: 2024-12-05
Payer: MEDICARE

## 2024-12-05 DIAGNOSIS — E78.2 MIXED HYPERLIPIDEMIA: ICD-10-CM

## 2024-12-05 DIAGNOSIS — R06.02 SHORTNESS OF BREATH: ICD-10-CM

## 2024-12-05 PROCEDURE — 36415 COLL VENOUS BLD VENIPUNCTURE: CPT

## 2024-12-05 PROCEDURE — 83880 ASSAY OF NATRIURETIC PEPTIDE: CPT

## 2024-12-05 PROCEDURE — 80048 BASIC METABOLIC PNL TOTAL CA: CPT

## 2024-12-06 LAB
ANION GAP SERPL CALCULATED.3IONS-SCNC: 11 MMOL/L (ref 5–15)
BUN SERPL-MCNC: 20 MG/DL (ref 8–23)
BUN/CREAT SERPL: 20.6 (ref 7–25)
CALCIUM SPEC-SCNC: 9.2 MG/DL (ref 8.6–10.5)
CHLORIDE SERPL-SCNC: 105 MMOL/L (ref 98–107)
CO2 SERPL-SCNC: 24 MMOL/L (ref 22–29)
CREAT SERPL-MCNC: 0.97 MG/DL (ref 0.57–1)
EGFRCR SERPLBLD CKD-EPI 2021: 62.2 ML/MIN/1.73
GLUCOSE SERPL-MCNC: 66 MG/DL (ref 65–99)
NT-PROBNP SERPL-MCNC: 74.9 PG/ML (ref 0–900)
POTASSIUM SERPL-SCNC: 3.9 MMOL/L (ref 3.5–5.2)
SODIUM SERPL-SCNC: 140 MMOL/L (ref 136–145)

## 2024-12-13 ENCOUNTER — HOSPITAL ENCOUNTER (OUTPATIENT)
Dept: INFUSION THERAPY | Facility: HOSPITAL | Age: 74
Discharge: HOME | End: 2024-12-13
Payer: MEDICARE

## 2024-12-13 VITALS
DIASTOLIC BLOOD PRESSURE: 64 MMHG | HEIGHT: 66 IN | TEMPERATURE: 97.7 F | RESPIRATION RATE: 16 BRPM | WEIGHT: 164.24 LBS | OXYGEN SATURATION: 100 % | SYSTOLIC BLOOD PRESSURE: 170 MMHG | BODY MASS INDEX: 26.4 KG/M2 | HEART RATE: 57 BPM

## 2024-12-13 DIAGNOSIS — E78.2 MIXED HYPERLIPIDEMIA: Primary | ICD-10-CM

## 2024-12-13 PROCEDURE — 25010000002 INCLISIRAN SODIUM 284 MG/1.5ML SOLUTION PREFILLED SYRINGE: Performed by: NURSE PRACTITIONER

## 2024-12-13 PROCEDURE — 96372 THER/PROPH/DIAG INJ SC/IM: CPT

## 2024-12-13 RX ORDER — FAMOTIDINE 10 MG/ML
20 INJECTION, SOLUTION INTRAVENOUS AS NEEDED
OUTPATIENT
Start: 2025-06-11

## 2024-12-13 RX ORDER — DIPHENHYDRAMINE HYDROCHLORIDE 50 MG/ML
50 INJECTION INTRAMUSCULAR; INTRAVENOUS AS NEEDED
OUTPATIENT
Start: 2025-06-11

## 2024-12-13 RX ORDER — SODIUM CHLORIDE 9 MG/ML
20 INJECTION, SOLUTION INTRAVENOUS ONCE
OUTPATIENT
Start: 2025-06-11

## 2024-12-13 RX ORDER — HYDROCORTISONE SODIUM SUCCINATE 100 MG/2ML
100 INJECTION INTRAMUSCULAR; INTRAVENOUS AS NEEDED
OUTPATIENT
Start: 2025-06-11

## 2024-12-13 RX ADMIN — INCLISIRAN 284 MG: 284 INJECTION, SOLUTION SUBCUTANEOUS at 14:27

## 2025-03-04 DIAGNOSIS — Z12.31 SCREENING MAMMOGRAM FOR BREAST CANCER: Primary | ICD-10-CM

## 2025-03-20 RX ORDER — LISINOPRIL 10 MG/1
10 TABLET ORAL DAILY
Qty: 90 TABLET | Refills: 4 | OUTPATIENT
Start: 2025-03-20

## 2025-04-02 ENCOUNTER — LAB (OUTPATIENT)
Dept: LAB | Facility: HOSPITAL | Age: 75
End: 2025-04-02
Payer: MEDICARE

## 2025-04-02 ENCOUNTER — HOSPITAL ENCOUNTER (OUTPATIENT)
Dept: MAMMOGRAPHY | Facility: HOSPITAL | Age: 75
Discharge: HOME | End: 2025-04-02
Payer: MEDICARE

## 2025-04-02 DIAGNOSIS — Z12.31 SCREENING MAMMOGRAM FOR BREAST CANCER: ICD-10-CM

## 2025-04-02 DIAGNOSIS — E78.2 MIXED HYPERLIPIDEMIA: ICD-10-CM

## 2025-04-02 LAB
ALBUMIN SERPL-MCNC: 4.2 G/DL (ref 3.5–5.2)
ALP SERPL-CCNC: 53 U/L (ref 39–117)
ALT SERPL W P-5'-P-CCNC: 14 U/L (ref 1–33)
ANION GAP SERPL CALCULATED.3IONS-SCNC: 7.8 MMOL/L (ref 5–15)
AST SERPL-CCNC: 23 U/L (ref 1–32)
BILIRUB CONJ SERPL-MCNC: 0.1 MG/DL (ref 0–0.3)
BILIRUB INDIRECT SERPL-MCNC: 0.3 MG/DL
BILIRUB SERPL-MCNC: 0.4 MG/DL (ref 0–1.2)
BUN SERPL-MCNC: 21 MG/DL (ref 8–23)
BUN/CREAT SERPL: 21.9 (ref 7–25)
CALCIUM SPEC-SCNC: 9.4 MG/DL (ref 8.6–10.5)
CHLORIDE SERPL-SCNC: 106 MMOL/L (ref 98–107)
CHOLEST SERPL-MCNC: 182 MG/DL (ref 0–200)
CO2 SERPL-SCNC: 26.2 MMOL/L (ref 22–29)
CREAT SERPL-MCNC: 0.96 MG/DL (ref 0.57–1)
EGFRCR SERPLBLD CKD-EPI 2021: 62.6 ML/MIN/1.73
GLUCOSE SERPL-MCNC: 100 MG/DL (ref 65–99)
HDLC SERPL-MCNC: 59 MG/DL (ref 40–60)
LDLC SERPL CALC-MCNC: 104 MG/DL (ref 0–100)
LDLC/HDLC SERPL: 1.72 {RATIO}
POTASSIUM SERPL-SCNC: 4.1 MMOL/L (ref 3.5–5.2)
PROT SERPL-MCNC: 7.6 G/DL (ref 6–8.5)
SODIUM SERPL-SCNC: 140 MMOL/L (ref 136–145)
TRIGL SERPL-MCNC: 109 MG/DL (ref 0–150)
VLDLC SERPL-MCNC: 19 MG/DL (ref 5–40)

## 2025-04-02 PROCEDURE — 36415 COLL VENOUS BLD VENIPUNCTURE: CPT

## 2025-04-02 PROCEDURE — 77067 SCR MAMMO BI INCL CAD: CPT

## 2025-04-02 PROCEDURE — 80048 BASIC METABOLIC PNL TOTAL CA: CPT

## 2025-04-02 PROCEDURE — 80061 LIPID PANEL: CPT

## 2025-04-02 PROCEDURE — 77063 BREAST TOMOSYNTHESIS BI: CPT

## 2025-04-02 PROCEDURE — 80076 HEPATIC FUNCTION PANEL: CPT

## 2025-04-14 NOTE — PROGRESS NOTES
[facility]  Cardiology progress Note    Patient Name: Anabell Richards  : 1950    CHIEF COMPLAINT  Hypertension        Subjective   Subjective     HISTORY OF PRESENT ILLNESS    Anabell Richards is a 75 y.o. female with history of hypertension.  No chest pain.    REVIEW OF SYSTEMS    Constitutional:    No fever, no weight loss  Skin:     No rash  Otolaryngeal:    No difficulty swallowing  Cardiovascular: See HPI.  Pulmonary:    No cough, no sputum production    Personal History     Social History:    reports that she has never smoked. She has been exposed to tobacco smoke. She has never used smokeless tobacco. She reports that she does not drink alcohol and does not use drugs.    Home Medications:  Current Outpatient Medications on File Prior to Visit   Medication Sig    aspirin 81 MG chewable tablet Chew 1 tablet Daily.    Calcium Carb-Cholecalciferol (CALCIUM + VITAMIN D3 PO) Take  by mouth Daily.    carvedilol (COREG) 3.125 MG tablet TAKE 1 TABLET BY MOUTH TWICE DAILY    coenzyme Q10 100 MG capsule Take 1 capsule by mouth Daily.    Collagen-Vitamin C 1000-10 MG tablet Take  by mouth Daily.    INCLISIRAN SODIUM SC Inject  under the skin into the appropriate area as directed Every 6 (Six) Months.    multivitamin with minerals tablet tablet Take 1 tablet by mouth Daily.    tretinoin (RETIN-A) 0.025 % cream Apply 1 Application topically to the appropriate area as directed Every Night.    vitamin B-12 (CYANOCOBALAMIN) 1000 MCG tablet Take 1 tablet by mouth Daily.    [DISCONTINUED] ezetimibe (ZETIA) 10 MG tablet TAKE 1 TABLET BY MOUTH DAILY    [DISCONTINUED] lisinopril (PRINIVIL,ZESTRIL) 20 MG tablet Take 1 tablet by mouth Daily.    [DISCONTINUED] furosemide (LASIX) 20 MG tablet Take 1 tablet by mouth Daily. (Patient not taking: Reported on 2025)    [DISCONTINUED] potassium chloride 10 MEQ CR tablet Take 1 tablet by mouth Daily. (Patient not taking: Reported on 2025)     No current  facility-administered medications on file prior to visit.       Past Medical History:   Diagnosis Date    Allergies     Coronary artery disease involving native heart without angina pectoris 05/25/2021    No significant occlusive coronary disease.  Mild atherosclerotic narrowing seen in multiple vessels in multiple locations on a cath May 25, 2021    Droopy eyelid, bilateral     Heart murmur     Hyperlipidemia     Hypertension     Irritation of right eye 07/08/2022    INSTRUCTED TO NOTIFY SURGEON ASAP    Mitral valve disease 11/10/2021     Mild mitral valve prolapse with mild mitral valve regurgitation.  On echo August 1, 2019    Mitral valve prolapse     Sjogren's syndrome     Sleep apnea        Allergies:  Allergies   Allergen Reactions    Sulfamethoxazole-Trimethoprim Rash       Objective    Objective       Vitals:   Heart Rate:  [55] 55  BP: (156)/(74) 156/74  Body mass index is 26.02 kg/m².     PHYSICAL EXAM:    General Appearance:   well developed  well nourished  HENT:   oropharynx moist  lips not cyanotic  Neck:  thyroid not enlarged  supple  Respiratory:  no respiratory distress  normal breath sounds  no rales  Cardiovascular:  no jugular venous distention  regular rhythm  apical impulse normal  S1 normal, S2 normal  no S3, no S4   no murmur  no rub, no thrill  carotid pulses normal; no bruit  pedal pulses normal  lower extremity edema: none    Skin:   warm, dry  Psychiatric:  judgement and insight appropriate  normal mood and affect        Result Review:  I have personally reviewed the available results from  [x]  Laboratory  [x]  EKG  [x]  Cardiology  [x]  Medications  [x]  Old records  []  Other:     Procedures  Lab Results   Component Value Date    CHOL 182 04/02/2025    CHOL 164 06/18/2024    CHOL 159 07/10/2023     Lab Results   Component Value Date    TRIG 109 04/02/2025    TRIG 114 06/18/2024    TRIG 93 07/10/2023     Lab Results   Component Value Date    HDL 59 04/02/2025    HDL 62 (H) 06/18/2024     HDL 56 07/10/2023     Lab Results   Component Value Date     (H) 04/02/2025    LDL 82 06/18/2024    LDL 86 07/10/2023     Lab Results   Component Value Date    VLDL 19 04/02/2025    VLDL 20 06/18/2024    VLDL 17 07/10/2023     Results for orders placed during the hospital encounter of 11/11/24    Adult Transthoracic Echo Complete W/ Cont if Necessary Per Protocol    Interpretation Summary  Normal left ventricular systolic function with mild left ventricular hypertrophy.  Mild mitral prolapse.  Mild mitral regurgitation.  Trace TR.     Impression/Plan:  1.  Essential hypertension Uncontrolled: Increase lisinopril to 20 mg twice a day.  Continue carvedilol 3.25 mg twice a day.  Monitor blood pressure regularly.  2.  Stable CAD: Continue aspirin 81 mg once a day.  No chest pain.  3.  Mixed hyperlipidemia: Continue Zetia 10 mg once a day.  Lipid profile shows a LDL of 86.  Monitor lipid and hepatic profile.                 Rashi Corona MD   04/17/25   11:50 EDT

## 2025-04-17 ENCOUNTER — OFFICE VISIT (OUTPATIENT)
Dept: CARDIOLOGY | Facility: CLINIC | Age: 75
End: 2025-04-17
Payer: MEDICARE

## 2025-04-17 VITALS
HEIGHT: 66 IN | DIASTOLIC BLOOD PRESSURE: 74 MMHG | WEIGHT: 161.2 LBS | BODY MASS INDEX: 25.91 KG/M2 | SYSTOLIC BLOOD PRESSURE: 156 MMHG | HEART RATE: 55 BPM

## 2025-04-17 DIAGNOSIS — I25.10 CORONARY ARTERY DISEASE INVOLVING NATIVE CORONARY ARTERY OF NATIVE HEART WITHOUT ANGINA PECTORIS: Primary | ICD-10-CM

## 2025-04-17 DIAGNOSIS — I10 HYPERTENSION, ESSENTIAL: ICD-10-CM

## 2025-04-17 DIAGNOSIS — E78.2 HYPERLIPEMIA, MIXED: ICD-10-CM

## 2025-04-17 PROCEDURE — 3078F DIAST BP <80 MM HG: CPT | Performed by: SPECIALIST

## 2025-04-17 PROCEDURE — 3077F SYST BP >= 140 MM HG: CPT | Performed by: SPECIALIST

## 2025-04-17 PROCEDURE — 99214 OFFICE O/P EST MOD 30 MIN: CPT | Performed by: SPECIALIST

## 2025-04-17 PROCEDURE — 1160F RVW MEDS BY RX/DR IN RCRD: CPT | Performed by: SPECIALIST

## 2025-04-17 PROCEDURE — 1159F MED LIST DOCD IN RCRD: CPT | Performed by: SPECIALIST

## 2025-04-17 RX ORDER — EZETIMIBE 10 MG/1
10 TABLET ORAL DAILY
Qty: 90 TABLET | Refills: 1 | Status: SHIPPED | OUTPATIENT
Start: 2025-04-17

## 2025-04-17 RX ORDER — LISINOPRIL 20 MG/1
20 TABLET ORAL 2 TIMES DAILY
Qty: 180 TABLET | Refills: 3 | Status: SHIPPED | OUTPATIENT
Start: 2025-04-17

## 2025-04-23 ENCOUNTER — TELEPHONE (OUTPATIENT)
Dept: CARDIOLOGY | Facility: CLINIC | Age: 75
End: 2025-04-23
Payer: MEDICARE

## 2025-04-23 RX ORDER — LISINOPRIL 20 MG/1
20 TABLET ORAL 2 TIMES DAILY
Qty: 180 TABLET | Refills: 3 | OUTPATIENT
Start: 2025-04-23

## 2025-06-11 ENCOUNTER — HOSPITAL ENCOUNTER (OUTPATIENT)
Dept: INFUSION THERAPY | Facility: HOSPITAL | Age: 75
Discharge: HOME | End: 2025-06-11
Admitting: NURSE PRACTITIONER
Payer: MEDICARE

## 2025-06-11 VITALS
DIASTOLIC BLOOD PRESSURE: 63 MMHG | SYSTOLIC BLOOD PRESSURE: 170 MMHG | RESPIRATION RATE: 20 BRPM | HEART RATE: 59 BPM | OXYGEN SATURATION: 97 % | BODY MASS INDEX: 26.11 KG/M2 | TEMPERATURE: 97.7 F | WEIGHT: 162.48 LBS | HEIGHT: 66 IN

## 2025-06-11 DIAGNOSIS — E78.2 MIXED HYPERLIPIDEMIA: Primary | ICD-10-CM

## 2025-06-11 PROCEDURE — 25010000002 INCLISIRAN SODIUM 284 MG/1.5ML SOLUTION PREFILLED SYRINGE: Performed by: NURSE PRACTITIONER

## 2025-06-11 PROCEDURE — 96372 THER/PROPH/DIAG INJ SC/IM: CPT

## 2025-06-11 RX ORDER — FAMOTIDINE 10 MG/ML
20 INJECTION, SOLUTION INTRAVENOUS AS NEEDED
OUTPATIENT
Start: 2025-12-08

## 2025-06-11 RX ORDER — HYDROCORTISONE SODIUM SUCCINATE 100 MG/2ML
100 INJECTION INTRAMUSCULAR; INTRAVENOUS AS NEEDED
OUTPATIENT
Start: 2025-12-08

## 2025-06-11 RX ORDER — DIPHENHYDRAMINE HYDROCHLORIDE 50 MG/ML
50 INJECTION, SOLUTION INTRAMUSCULAR; INTRAVENOUS AS NEEDED
OUTPATIENT
Start: 2025-12-08

## 2025-06-11 RX ORDER — SODIUM CHLORIDE 9 MG/ML
20 INJECTION, SOLUTION INTRAVENOUS ONCE
OUTPATIENT
Start: 2025-12-08

## 2025-06-11 RX ADMIN — INCLISIRAN 284 MG: 284 INJECTION, SOLUTION SUBCUTANEOUS at 16:18

## 2025-06-19 ENCOUNTER — OFFICE VISIT (OUTPATIENT)
Dept: FAMILY MEDICINE CLINIC | Age: 75
End: 2025-06-19
Payer: MEDICARE

## 2025-06-19 ENCOUNTER — TELEPHONE (OUTPATIENT)
Dept: FAMILY MEDICINE CLINIC | Age: 75
End: 2025-06-19

## 2025-06-19 ENCOUNTER — LAB (OUTPATIENT)
Dept: LAB | Facility: HOSPITAL | Age: 75
End: 2025-06-19
Payer: MEDICARE

## 2025-06-19 VITALS
BODY MASS INDEX: 26.52 KG/M2 | TEMPERATURE: 97.8 F | HEART RATE: 65 BPM | DIASTOLIC BLOOD PRESSURE: 49 MMHG | HEIGHT: 66 IN | SYSTOLIC BLOOD PRESSURE: 148 MMHG | OXYGEN SATURATION: 99 % | WEIGHT: 165 LBS

## 2025-06-19 DIAGNOSIS — I10 PRIMARY HYPERTENSION: ICD-10-CM

## 2025-06-19 DIAGNOSIS — D63.8 ANEMIA, CHRONIC DISEASE: ICD-10-CM

## 2025-06-19 DIAGNOSIS — M85.89 OSTEOPENIA OF MULTIPLE SITES: ICD-10-CM

## 2025-06-19 DIAGNOSIS — Z00.00 MEDICARE ANNUAL WELLNESS VISIT, SUBSEQUENT: Primary | ICD-10-CM

## 2025-06-19 DIAGNOSIS — Z86.2 HISTORY OF ANEMIA DUE TO VITAMIN B12 DEFICIENCY: ICD-10-CM

## 2025-06-19 DIAGNOSIS — Z86.39 HISTORY OF HYPERTHYROIDISM: ICD-10-CM

## 2025-06-19 LAB
25(OH)D3 SERPL-MCNC: 38.3 NG/ML (ref 30–100)
DEPRECATED RDW RBC AUTO: 44.8 FL (ref 37–54)
ERYTHROCYTE [DISTWIDTH] IN BLOOD BY AUTOMATED COUNT: 13 % (ref 12.3–15.4)
HCT VFR BLD AUTO: 38.1 % (ref 34–46.6)
HGB BLD-MCNC: 12.5 G/DL (ref 12–15.9)
MCH RBC QN AUTO: 30.6 PG (ref 26.6–33)
MCHC RBC AUTO-ENTMCNC: 32.8 G/DL (ref 31.5–35.7)
MCV RBC AUTO: 93.2 FL (ref 79–97)
PLATELET # BLD AUTO: 174 10*3/MM3 (ref 140–450)
PMV BLD AUTO: 9.7 FL (ref 6–12)
RBC # BLD AUTO: 4.09 10*6/MM3 (ref 3.77–5.28)
TSH SERPL DL<=0.05 MIU/L-ACNC: 1.29 UIU/ML (ref 0.27–4.2)
VIT B12 BLD-MCNC: 1123 PG/ML (ref 211–946)
WBC NRBC COR # BLD AUTO: 3.43 10*3/MM3 (ref 3.4–10.8)

## 2025-06-19 PROCEDURE — 85027 COMPLETE CBC AUTOMATED: CPT

## 2025-06-19 PROCEDURE — 36415 COLL VENOUS BLD VENIPUNCTURE: CPT

## 2025-06-19 PROCEDURE — 84443 ASSAY THYROID STIM HORMONE: CPT

## 2025-06-19 PROCEDURE — 82607 VITAMIN B-12: CPT

## 2025-06-19 PROCEDURE — 82306 VITAMIN D 25 HYDROXY: CPT

## 2025-06-19 NOTE — ASSESSMENT & PLAN NOTE
BP elevated some today but improved from last visit with cardiology where medication was changed. Continue BP medication.

## 2025-06-19 NOTE — PROGRESS NOTES
Subjective   The ABCs of the Annual Wellness Visit  Medicare Wellness Visit      Anabell Richards is a 75 y.o. patient who presents for a Medicare Wellness Visit.    The following portions of the patient's history were reviewed and   updated as appropriate: allergies, current medications, past family history, past medical history, past social history, past surgical history, and problem list.    Compared to one year ago, the patient's physical   health is better.  Compared to one year ago, the patient's mental   health is the same.    Recent Hospitalizations:  She was not admitted to the hospital during the last year.     Current Medical Providers:  Patient Care Team:  Sally Sandhu APRN as PCP - General (Nurse Practitioner)  Rashi Corona MD as Consulting Physician (Cardiology)    Outpatient Medications Prior to Visit   Medication Sig Dispense Refill    aspirin 81 MG chewable tablet Chew 1 tablet Daily.      Calcium Carb-Cholecalciferol (CALCIUM + VITAMIN D3 PO) Take  by mouth Daily.      carvedilol (COREG) 3.125 MG tablet TAKE 1 TABLET BY MOUTH TWICE DAILY 180 tablet 3    coenzyme Q10 100 MG capsule Take 1 capsule by mouth Daily.      Collagen-Vitamin C 1000-10 MG tablet Take  by mouth Daily.      ezetimibe (ZETIA) 10 MG tablet Take 1 tablet by mouth Daily. 90 tablet 1    INCLISIRAN SODIUM SC Inject  under the skin into the appropriate area as directed Every 6 (Six) Months.      lisinopril (PRINIVIL,ZESTRIL) 20 MG tablet Take 1 tablet by mouth 2 (Two) Times a Day. 180 tablet 3    multivitamin with minerals tablet tablet Take 1 tablet by mouth Daily.      tretinoin (RETIN-A) 0.025 % cream Apply 1 Application topically to the appropriate area as directed Every Night.      vitamin B-12 (CYANOCOBALAMIN) 1000 MCG tablet Take 1 tablet by mouth Daily.       No facility-administered medications prior to visit.     No opioid medication identified on active medication list. I have reviewed chart for other  "potential  high risk medication/s and harmful drug interactions in the elderly.      Aspirin is on active medication list. Aspirin use is indicated based on review of current medical condition/s. Pros and cons of this therapy have been discussed today. Benefits of this medication outweigh potential harm.  Patient has been encouraged to continue taking this medication.  .      Patient Active Problem List   Diagnosis    Sjogren's syndrome    Anemia, chronic disease    Pulmonary nodule    Mild mitral valve prolapse    Recurrent UTI    Hyperlipidemia    Hypertension    Allergies    Coronary artery disease involving native heart without angina pectoris    History of hyperthyroidism    History of anemia due to vitamin B12 deficiency    Osteopenia of multiple sites     Advance Care Planning Advance Directive is on file.  ACP discussion was held with the patient during this visit. Patient has an advance directive in EMR which is still valid.             Objective   Vitals:    06/19/25 1430   BP: 148/49   Pulse: 65   Temp: 97.8 °F (36.6 °C)   TempSrc: Oral   SpO2: 99%   Weight: 74.8 kg (165 lb)   Height: 167.6 cm (66\")   PainSc: 0-No pain       Estimated body mass index is 26.63 kg/m² as calculated from the following:    Height as of this encounter: 167.6 cm (66\").    Weight as of this encounter: 74.8 kg (165 lb).    BMI is >= 25 and <30. (Overweight) The following options were offered after discussion;: information on healthy weight added to patient's after visit summary            Does the patient have evidence of cognitive impairment? No  Lab Results   Component Value Date    TRIG 109 04/02/2025    HDL 59 04/02/2025     (H) 04/02/2025    VLDL 19 04/02/2025                                                                                                Health  Risk Assessment    Smoking Status:  Social History     Tobacco Use   Smoking Status Never    Passive exposure: Past   Smokeless Tobacco Never     Alcohol " Consumption:  Social History     Substance and Sexual Activity   Alcohol Use Never       Fall Risk Screen  STEADI Fall Risk Assessment was completed, and patient is at LOW risk for falls.Assessment completed on:2025    Depression Screening   Little interest or pleasure in doing things? Not at all   Feeling down, depressed, or hopeless? Not at all   PHQ-2 Total Score 0      Health Habits and Functional and Cognitive Screenin/19/2025     2:31 PM   Functional & Cognitive Status   Do you have difficulty preparing food and eating? No   Do you have difficulty bathing yourself, getting dressed or grooming yourself? No   Do you have difficulty using the toilet? No   Do you have difficulty moving around from place to place? No   Do you have trouble with steps or getting out of a bed or a chair? No   Current Diet Well Balanced Diet   Dental Exam Up to date   Eye Exam Up to date   Exercise (times per week) 3 times per week   Current Exercises Include Yard Work;Walking   Do you need help using the phone?  No   Are you deaf or do you have serious difficulty hearing?  No   Do you need help to go to places out of walking distance? No   Do you need help shopping? No   Do you need help preparing meals?  No   Do you need help with housework?  No   Do you need help with laundry? No   Do you need help taking your medications? No   Do you need help managing money? No   Do you ever drive or ride in a car without wearing a seat belt? No   Have you felt unusual stress, anger or loneliness in the last month? Yes   Who do you live with? Spouse   If you need help, do you have trouble finding someone available to you? No   Have you been bothered in the last four weeks by sexual problems? No   Do you have difficulty concentrating, remembering or making decisions? Yes           Age-appropriate Screening Schedule:  Refer to the list below for future screening recommendations based on patient's age, sex and/or medical conditions.  Orders for these recommended tests are listed in the plan section. The patient has been provided with a written plan.    Health Maintenance List  Health Maintenance   Topic Date Due    ANNUAL WELLNESS VISIT  06/13/2025    COLORECTAL CANCER SCREENING  08/06/2025    TDAP/TD VACCINES (1 - Tdap) 12/16/2025 (Originally 3/3/1969)    COVID-19 Vaccine (8 - 2024-25 season) 12/19/2025 (Originally 3/30/2025)    INFLUENZA VACCINE  07/01/2025    MAMMOGRAM  04/02/2026    LIPID PANEL  04/02/2026    DXA SCAN  06/27/2026    HEPATITIS C SCREENING  Completed    Pneumococcal Vaccine 50+  Completed    ZOSTER VACCINE  Completed                                                                                                                                                CMS Preventative Services Quick Reference  Risk Factors Identified During Encounter  Immunizations Discussed/Encouraged: Discussed Tdap. She thinks she may have had at [facility]    The above risks/problems have been discussed with the patient.  Pertinent information has been shared with the patient in the After Visit Summary.  An After Visit Summary and PPPS were made available to the patient.    Follow Up:   Next Medicare Wellness visit to be scheduled in 1 year.         Additional E&M Note during same encounter follows:  Patient has additional, significant, and separately identifiable condition(s)/problem(s) that require work above and beyond the Medicare Wellness Visit     Chief Complaint  Medicare Wellness-subsequent    Subjective   HPI  Kathryn is also being seen today for additional medical problem/s.  She is on carvedilol and lisinopril for HTN.  Dose of lisinopril was increased at last visit with cardiology.   She also has hyperlipidemia. Statins previously caused leg cramps. She is seeing cardiology and is on inclisiran and zetia.    She has sleep apnea. No longer wearing CPAP but does have oral device from dentist.   She has history of pulmonary nodule.  "Was stable on last CT. Was advised that last visit with pulmonology that no further workup was needed at this time and to follow up as needed.   History of Vitamin B 12 deficiency.  BDS with osteopenia. Taking calcium and Vitamin D.           Objective   Vital Signs:  /49   Pulse 65   Temp 97.8 °F (36.6 °C) (Oral)   Ht 167.6 cm (66\")   Wt 74.8 kg (165 lb)   SpO2 99%   BMI 26.63 kg/m²     Physical Exam  Vitals reviewed.   Constitutional:       General: She is not in acute distress.     Appearance: Normal appearance. She is well-developed.   HENT:      Head: Normocephalic and atraumatic.   Cardiovascular:      Rate and Rhythm: Normal rate and regular rhythm.   Pulmonary:      Effort: Pulmonary effort is normal.      Breath sounds: Normal breath sounds.   Neurological:      Mental Status: She is alert and oriented to person, place, and time.   Psychiatric:         Mood and Affect: Mood and affect normal.                    Assessment and Plan      Medicare annual wellness visit, subsequent  Appropriate screenings and vaccinations were reviewed with the pt and offered as indicated.  Pt counseled on healthy lifestyle including healthy diet, exercise.    Will let me know if she wishes to proceed with Cologuard or colonoscopy.        Anemia, chronic disease  Rechecking counts  Orders:    CBC No Differential; Future    History of hyperthyroidism  Rechecking thyroid labs  Orders:    TSH; Future    History of anemia due to vitamin B12 deficiency  Rechecking B12  Orders:    Vitamin B12; Future    Osteopenia of multiple sites  Continue calcium and Vitamin D. Checking Vitamin D labs  Orders:    Vitamin D 25 hydroxy; Future    Primary hypertension    BP elevated some today but improved from last visit with cardiology where medication was changed. Continue BP medication.                Follow Up   Return in about 1 year (around 6/19/2026) for Medicare Wellness.  Patient was given instructions and counseling regarding " her condition or for health maintenance advice. Please see specific information pulled into the AVS if appropriate.

## 2025-06-19 NOTE — ASSESSMENT & PLAN NOTE
Continue calcium and Vitamin D. Checking Vitamin D labs  Orders:    Vitamin D 25 hydroxy; Future

## 2025-08-12 DIAGNOSIS — G47.19 EXCESSIVE DAYTIME SLEEPINESS: Primary | ICD-10-CM

## 2025-08-12 DIAGNOSIS — I05.9 MITRAL VALVE DISEASE: Chronic | ICD-10-CM

## 2025-08-12 DIAGNOSIS — I25.10 CORONARY ARTERY DISEASE INVOLVING NATIVE HEART WITHOUT ANGINA PECTORIS, UNSPECIFIED VESSEL OR LESION TYPE: Chronic | ICD-10-CM

## 2025-08-12 DIAGNOSIS — I10 PRIMARY HYPERTENSION: ICD-10-CM

## 2025-08-13 PROCEDURE — 87086 URINE CULTURE/COLONY COUNT: CPT | Performed by: FAMILY MEDICINE

## 2025-08-13 RX ORDER — CARVEDILOL 3.12 MG/1
3.12 TABLET ORAL EVERY 12 HOURS SCHEDULED
Qty: 180 TABLET | Refills: 3 | Status: SHIPPED | OUTPATIENT
Start: 2025-08-13

## 2025-08-14 ENCOUNTER — HOSPITAL ENCOUNTER (OUTPATIENT)
Dept: SLEEP MEDICINE | Facility: HOSPITAL | Age: 75
Discharge: HOME | End: 2025-08-14
Admitting: NURSE PRACTITIONER
Payer: MEDICARE

## 2025-08-14 DIAGNOSIS — G47.19 EXCESSIVE DAYTIME SLEEPINESS: ICD-10-CM

## 2025-08-14 PROCEDURE — G0399 HOME SLEEP TEST/TYPE 3 PORTA: HCPCS

## (undated) DEVICE — GOWN,NON-REINFORCED,SIRUS,SET IN SLV,XL: Brand: MEDLINE

## (undated) DEVICE — STERILE COTTON TIP 6IN 10PK: Brand: MEDLINE

## (undated) DEVICE — ELECTRD NDL EZ CLN MOD 2.75IN

## (undated) DEVICE — GLV SURG BIOGEL SENSR LTX PF SZ7.5

## (undated) DEVICE — PATIENT RETURN ELECTRODE, SINGLE-USE, CONTACT QUALITY MONITORING, ADULT, WITH 9FT CORD, FOR PATIENTS WEIGING OVER 33LBS. (15KG): Brand: MEGADYNE

## (undated) DEVICE — PK ENT 40

## (undated) DEVICE — GLV SURG BIOGEL M LTX PF 6 1/2

## (undated) DEVICE — NDL HYPO PRECISIONGLIDE REG 25G 1 1/2

## (undated) DEVICE — TRAP FLD MINIVAC MEGADYNE 100ML

## (undated) DEVICE — GAUZE,SPONGE,4"X4",16PLY,XRAY,STRL,LF: Brand: MEDLINE

## (undated) DEVICE — SWABSTK SKINPREP PVPI LF PK/3

## (undated) DEVICE — WIPE INST MEROCEL

## (undated) DEVICE — CROUCH CORNEAL PROTECTOR: Brand: BAUSCH + LOMB

## (undated) DEVICE — TBG PENCL TELESCP MEGADYNE SMOKE EVAC 10FT

## (undated) DEVICE — SUT GUT PLN FAST ABS 5/0 PC1 18IN 1915G

## (undated) DEVICE — HDRST POSITIONING FM RND 2X9IN